# Patient Record
Sex: FEMALE | Race: WHITE | Employment: PART TIME | URBAN - METROPOLITAN AREA
[De-identification: names, ages, dates, MRNs, and addresses within clinical notes are randomized per-mention and may not be internally consistent; named-entity substitution may affect disease eponyms.]

---

## 2017-01-09 ENCOUNTER — HOSPITAL ENCOUNTER (EMERGENCY)
Age: 37
Discharge: HOME | End: 2017-01-09
Attending: EMERGENCY MEDICINE
Payer: SELF-PAY

## 2017-01-09 VITALS
WEIGHT: 120 LBS | RESPIRATION RATE: 16 BRPM | OXYGEN SATURATION: 97 % | TEMPERATURE: 97.6 F | SYSTOLIC BLOOD PRESSURE: 119 MMHG | HEIGHT: 66 IN | BODY MASS INDEX: 19.29 KG/M2 | HEART RATE: 86 BPM | DIASTOLIC BLOOD PRESSURE: 70 MMHG

## 2017-01-09 DIAGNOSIS — J06.9 VIRAL UPPER RESPIRATORY ILLNESS: Primary | ICD-10-CM

## 2017-01-09 LAB — DEPRECATED S PYO AG THROAT QL EIA: NEGATIVE

## 2017-01-09 PROCEDURE — 87081 CULTURE SCREEN ONLY: CPT | Performed by: EMERGENCY MEDICINE

## 2017-01-09 PROCEDURE — 99282 EMERGENCY DEPT VISIT SF MDM: CPT | Performed by: PHYSICIAN ASSISTANT

## 2017-01-09 PROCEDURE — 87880 STREP A ASSAY W/OPTIC: CPT | Performed by: EMERGENCY MEDICINE

## 2017-01-09 RX ORDER — BENZONATATE 200 MG/1
200 CAPSULE ORAL
Qty: 28 CAP | Refills: 0 | Status: SHIPPED | OUTPATIENT
Start: 2017-01-09 | End: 2017-07-12 | Stop reason: CLARIF

## 2017-01-09 RX ORDER — AZELASTINE 1 MG/ML
1 SPRAY, METERED NASAL 2 TIMES DAILY
Qty: 1 BOTTLE | Refills: 0 | Status: SHIPPED | OUTPATIENT
Start: 2017-01-09 | End: 2017-07-12 | Stop reason: CLARIF

## 2017-01-09 NOTE — ED NOTES
I have reviewed discharge instructions with the patient. The patient verbalized understanding. Prescriptions x 3, work note, and AVS given and explained to pt. Pt denies any further needs, questions, or concerns at this time. No adverse reactions to any medications, treatments, or procedures noted. Pt talking with SW about vouchers for prescriptions. Ambulatory to lobby with steady gait.

## 2017-01-09 NOTE — LETTER
[facility] [facility] EMERGENCY DEPT One [address] [id number] 
[phone] Work/School Note Date: 1/9/2017 To Whom It May concern: 
 
Radha Granados was seen and treated today in the emergency room by the following provider(s): 
Attending Provider: Trinidad Cheema MD 
Physician Assistant: AICHA Alvarez.   
 
Radha Granados may return to work on 01/11/17. Sincerely, AICHA Alvarez

## 2017-01-09 NOTE — DISCHARGE INSTRUCTIONS
Viral Respiratory Infection: Care Instructions  Your Care Instructions  Viruses are very small organisms. They grow in number after they enter your body. There are many types that cause different illnesses, such as colds and the mumps. The symptoms of a viral respiratory infection often start quickly. They include a fever, sore throat, and runny nose. You may also just not feel well. Or you may not want to eat much. Most viral respiratory infections are not serious. They usually get better with time and self-care. Antibiotics are not used to treat a viral infection. That's because antibiotics will not help cure a viral illness. In some cases, antiviral medicine can help your body fight a serious viral infection. Follow-up care is a key part of your treatment and safety. Be sure to make and go to all appointments, and call your doctor if you are having problems. It's also a good idea to know your test results and keep a list of the medicines you take. How can you care for yourself at home? · Rest as much as possible until you feel better. · Be safe with medicines. Take your medicine exactly as prescribed. Call your doctor if you think you are having a problem with your medicine. You will get more details on the specific medicine your doctor prescribes. · Take an over-the-counter pain medicine, such as acetaminophen (Tylenol), ibuprofen (Advil, Motrin), or naproxen (Aleve), as needed for pain and fever. Read and follow all instructions on the label. Do not give aspirin to anyone younger than 20. It has been linked to Reye syndrome, a serious illness. · Drink plenty of fluids, enough so that your urine is light yellow or clear like water. Hot fluids, such as tea or soup, may help relieve congestion in your nose and throat. If you have kidney, heart, or liver disease and have to limit fluids, talk with your doctor before you increase the amount of fluids you drink.   · Try to clear mucus from your lungs by breathing deeply and coughing. · Gargle with warm salt water once an hour. This can help reduce swelling and throat pain. Use 1 teaspoon of salt mixed in 1 cup of warm water. · Do not smoke or allow others to smoke around you. If you need help quitting, talk to your doctor about stop-smoking programs and medicines. These can increase your chances of quitting for good. To avoid spreading the virus  · Cough or sneeze into a tissue. Then throw the tissue away. · If you don't have a tissue, use your hand to cover your cough or sneeze. Then clean your hand. You can also cough into your sleeve. · Wash your hands often. Use soap and warm water. Wash for 15 to 20 seconds each time. · If you don't have soap and water near you, you can clean your hands with alcohol wipes or gel. When should you call for help? Call your doctor now or seek immediate medical care if:  · You have a new or higher fever. · Your fever lasts more than 48 hours. · You have trouble breathing. · You have a fever with a stiff neck or a severe headache. · You are sensitive to light. · You feel very sleepy or confused. Watch closely for changes in your health, and be sure to contact your doctor if:  · You do not get better as expected. Where can you learn more? Go to http://dalila-salena.info/. Enter N200 in the search box to learn more about \"Viral Respiratory Infection: Care Instructions. \"  Current as of: June 30, 2016  Content Version: 11.1  © 1067-8088 [employer]. Care instructions adapted under license by [employer] (which disclaims liability or warranty for this information). If you have questions about a medical condition or this instruction, always ask your healthcare professional. [address] any warranty or liability for your use of this information.

## 2017-01-09 NOTE — ROUTINE PROCESS
Voucher to [facility] for $45 for Mucinex, and benzonatate. Patient states that she is currently at [facility].

## 2017-01-09 NOTE — ED PROVIDER NOTES
HPI Comments: Patient is here with a sore throat and congestion that started yesterday when she woke up. She lives in a homeless shelter for 30 other women in their children and everyone has been sick with the same symptoms. She has had no nausea, vomiting, headache, shortness of breath, chest pain or documented fever. She does work and did miss work today. She has no swelling of her arms or legs or weakness or other symptoms. Patient is a 43 y.o. female presenting with sore throat. The history is provided by the patient. Sore Throat           History reviewed. No pertinent past medical history. History reviewed. No pertinent past surgical history. History reviewed. No pertinent family history. Social History     Social History    Marital status: LEGALLY      Spouse name: N/A    Number of children: N/A    Years of education: N/A     Occupational History    Not on file. Social History Main Topics    Smoking status: Current Every Day Smoker     Packs/day: 1.00    Smokeless tobacco: Not on file    Alcohol use No    Drug use: No    Sexual activity: Not on file     Other Topics Concern    Not on file     Social History Narrative         ALLERGIES: Review of patient's allergies indicates no known allergies. Review of Systems   Constitutional: Negative. HENT: Positive for sore throat. Eyes: Negative. Respiratory: Negative. Cardiovascular: Negative. Gastrointestinal: Negative. Genitourinary: Negative. Musculoskeletal: Negative. Skin: Negative. Neurological: Negative. Psychiatric/Behavioral: Negative. All other systems reviewed and are negative. Vitals:    01/09/17 1012   BP: 119/70   Pulse: 86   Resp: 16   Temp: 97.6 °F (36.4 °C)   SpO2: 97%   Weight: 54.4 kg (120 lb)   Height: 5' 6\" (1.676 m)            Physical Exam   Constitutional: She is oriented to person, place, and time. She appears well-developed and well-nourished.    HENT:   Head: Normocephalic and atraumatic. Right Ear: External ear normal.   Left Ear: External ear normal.   Mild posterior pharyngeal erythema and swelling, mild tenderness to anterior cervical lymph nodes bilaterally. Eyes: Conjunctivae and EOM are normal. Pupils are equal, round, and reactive to light. Neck: Normal range of motion. Neck supple. Cardiovascular: Normal rate, regular rhythm, normal heart sounds and intact distal pulses. Pulmonary/Chest: Effort normal and breath sounds normal. She has no wheezes. She has no rales. She exhibits no tenderness. Abdominal: Soft. Bowel sounds are normal.   Musculoskeletal: Normal range of motion. Neurological: She is alert and oriented to person, place, and time. She has normal reflexes. Skin: Skin is warm and dry. Psychiatric: She has a normal mood and affect. Her behavior is normal. Judgment and thought content normal.   Nursing note and vitals reviewed. MDM  Number of Diagnoses or Management Options     Amount and/or Complexity of Data Reviewed  Clinical lab tests: ordered and reviewed    Risk of Complications, Morbidity, and/or Mortality  Presenting problems: low  Diagnostic procedures: low  Management options: moderate    Patient Progress  Patient progress: stable    ED Course       Procedures    The patient was observed in the ED. Results Reviewed:      Recent Results (from the past 24 hour(s))   STREP AG SCREEN, GROUP A    Collection Time: 01/09/17 10:12 AM   Result Value Ref Range    Group A Strep Ag ID NEGATIVE  NEG       I'll treat patient for a viral upper respiratory infection today and have her use symptomatically treatment. Run a humidifier, drink plenty of fluids, rest and follow-up with a primary care physician if not doing any better. I did write her a note for work if needed as well. Return to the ED if worse. I discussed the results of all labs, procedures, radiographs, and treatments with the patient and available family. Treatment plan is agreed upon and the patient is ready for discharge. All voiced understanding of the discharge plan and medication instructions or changes as appropriate. Questions about treatment in the ED were answered. All were encouraged to return should symptoms worsen or new problems develop.

## 2017-01-11 LAB
BACTERIA SPEC CULT: NORMAL
SERVICE CMNT-IMP: NORMAL

## 2017-07-12 ENCOUNTER — APPOINTMENT (OUTPATIENT)
Dept: CT IMAGING | Age: 37
End: 2017-07-12
Attending: NURSE PRACTITIONER
Payer: SELF-PAY

## 2017-07-12 ENCOUNTER — HOSPITAL ENCOUNTER (EMERGENCY)
Age: 37
Discharge: HOME | End: 2017-07-12
Attending: EMERGENCY MEDICINE
Payer: SELF-PAY

## 2017-07-12 VITALS
TEMPERATURE: 99 F | HEIGHT: 66 IN | SYSTOLIC BLOOD PRESSURE: 117 MMHG | RESPIRATION RATE: 18 BRPM | DIASTOLIC BLOOD PRESSURE: 68 MMHG | BODY MASS INDEX: 19.29 KG/M2 | WEIGHT: 120 LBS | HEART RATE: 62 BPM | OXYGEN SATURATION: 99 %

## 2017-07-12 DIAGNOSIS — N83.10 CORPUS LUTEUM CYST: Primary | ICD-10-CM

## 2017-07-12 LAB
ALBUMIN SERPL BCP-MCNC: 3.8 G/DL (ref 3.5–5)
ALBUMIN/GLOB SERPL: 0.7 {RATIO} (ref 1.2–3.5)
ALP SERPL-CCNC: 108 U/L (ref 50–136)
ALT SERPL-CCNC: 15 U/L (ref 12–65)
ANION GAP BLD CALC-SCNC: 12 MMOL/L (ref 7–16)
AST SERPL W P-5'-P-CCNC: 12 U/L (ref 15–37)
BASOPHILS # BLD AUTO: 0 K/UL (ref 0–0.2)
BASOPHILS # BLD: 0 % (ref 0–2)
BILIRUB SERPL-MCNC: 0.3 MG/DL (ref 0.2–1.1)
BUN SERPL-MCNC: 8 MG/DL (ref 6–23)
CALCIUM SERPL-MCNC: 9 MG/DL (ref 8.3–10.4)
CHLORIDE SERPL-SCNC: 101 MMOL/L (ref 98–107)
CO2 SERPL-SCNC: 26 MMOL/L (ref 21–32)
CREAT SERPL-MCNC: 0.68 MG/DL (ref 0.6–1)
DIFFERENTIAL METHOD BLD: ABNORMAL
EOSINOPHIL # BLD: 0.2 K/UL (ref 0–0.8)
EOSINOPHIL NFR BLD: 1 % (ref 0.5–7.8)
ERYTHROCYTE [DISTWIDTH] IN BLOOD BY AUTOMATED COUNT: 13.5 % (ref 11.9–14.6)
GLOBULIN SER CALC-MCNC: 5.8 G/DL (ref 2.3–3.5)
GLUCOSE SERPL-MCNC: 131 MG/DL (ref 65–100)
HCG UR QL: NEGATIVE
HCT VFR BLD AUTO: 46.1 % (ref 35.8–46.3)
HGB BLD-MCNC: 15.5 G/DL (ref 11.7–15.4)
IMM GRANULOCYTES # BLD: 0 K/UL (ref 0–0.5)
IMM GRANULOCYTES NFR BLD AUTO: 0.3 % (ref 0–5)
LIPASE SERPL-CCNC: 98 U/L (ref 73–393)
LYMPHOCYTES # BLD AUTO: 12 % (ref 13–44)
LYMPHOCYTES # BLD: 1.6 K/UL (ref 0.5–4.6)
MCH RBC QN AUTO: 30.5 PG (ref 26.1–32.9)
MCHC RBC AUTO-ENTMCNC: 33.6 G/DL (ref 31.4–35)
MCV RBC AUTO: 90.7 FL (ref 79.6–97.8)
MONOCYTES # BLD: 0.7 K/UL (ref 0.1–1.3)
MONOCYTES NFR BLD AUTO: 6 % (ref 4–12)
NEUTS SEG # BLD: 10.6 K/UL (ref 1.7–8.2)
NEUTS SEG NFR BLD AUTO: 81 % (ref 43–78)
PLATELET # BLD AUTO: 359 K/UL (ref 150–450)
PMV BLD AUTO: 10.7 FL (ref 10.8–14.1)
POTASSIUM SERPL-SCNC: 3.7 MMOL/L (ref 3.5–5.1)
PROT SERPL-MCNC: 9.6 G/DL (ref 6.3–8.2)
RBC # BLD AUTO: 5.08 M/UL (ref 4.05–5.25)
SODIUM SERPL-SCNC: 139 MMOL/L (ref 136–145)
WBC # BLD AUTO: 13.2 K/UL (ref 4.3–11.1)

## 2017-07-12 PROCEDURE — 74011000258 HC RX REV CODE- 258: Performed by: EMERGENCY MEDICINE

## 2017-07-12 PROCEDURE — 81003 URINALYSIS AUTO W/O SCOPE: CPT | Performed by: NURSE PRACTITIONER

## 2017-07-12 PROCEDURE — 74011636320 HC RX REV CODE- 636/320: Performed by: NURSE PRACTITIONER

## 2017-07-12 PROCEDURE — 83690 ASSAY OF LIPASE: CPT | Performed by: NURSE PRACTITIONER

## 2017-07-12 PROCEDURE — 99285 EMERGENCY DEPT VISIT HI MDM: CPT | Performed by: NURSE PRACTITIONER

## 2017-07-12 PROCEDURE — 81025 URINE PREGNANCY TEST: CPT

## 2017-07-12 PROCEDURE — 96361 HYDRATE IV INFUSION ADD-ON: CPT | Performed by: NURSE PRACTITIONER

## 2017-07-12 PROCEDURE — 85025 COMPLETE CBC W/AUTO DIFF WBC: CPT | Performed by: NURSE PRACTITIONER

## 2017-07-12 PROCEDURE — 96374 THER/PROPH/DIAG INJ IV PUSH: CPT | Performed by: NURSE PRACTITIONER

## 2017-07-12 PROCEDURE — 74011636320 HC RX REV CODE- 636/320: Performed by: EMERGENCY MEDICINE

## 2017-07-12 PROCEDURE — 96375 TX/PRO/DX INJ NEW DRUG ADDON: CPT | Performed by: NURSE PRACTITIONER

## 2017-07-12 PROCEDURE — 74177 CT ABD & PELVIS W/CONTRAST: CPT

## 2017-07-12 PROCEDURE — 80053 COMPREHEN METABOLIC PANEL: CPT | Performed by: NURSE PRACTITIONER

## 2017-07-12 PROCEDURE — 74011250636 HC RX REV CODE- 250/636: Performed by: NURSE PRACTITIONER

## 2017-07-12 RX ORDER — ONDANSETRON 2 MG/ML
4 INJECTION INTRAMUSCULAR; INTRAVENOUS
Status: COMPLETED | OUTPATIENT
Start: 2017-07-12 | End: 2017-07-12

## 2017-07-12 RX ORDER — TRAMADOL HYDROCHLORIDE 50 MG/1
50 TABLET ORAL
Qty: 15 TAB | Refills: 0 | Status: SHIPPED | OUTPATIENT
Start: 2017-07-12 | End: 2017-07-26

## 2017-07-12 RX ORDER — KETOROLAC TROMETHAMINE 30 MG/ML
30 INJECTION, SOLUTION INTRAMUSCULAR; INTRAVENOUS
Status: COMPLETED | OUTPATIENT
Start: 2017-07-12 | End: 2017-07-12

## 2017-07-12 RX ORDER — SODIUM CHLORIDE 0.9 % (FLUSH) 0.9 %
10 SYRINGE (ML) INJECTION
Status: COMPLETED | OUTPATIENT
Start: 2017-07-12 | End: 2017-07-12

## 2017-07-12 RX ADMIN — Medication 10 ML: at 16:03

## 2017-07-12 RX ADMIN — DIATRIZOATE MEGLUMINE AND DIATRIZOATE SODIUM 15 ML: 600; 100 SOLUTION ORAL; RECTAL at 14:37

## 2017-07-12 RX ADMIN — SODIUM CHLORIDE 1000 ML: 900 INJECTION, SOLUTION INTRAVENOUS at 14:31

## 2017-07-12 RX ADMIN — IOPAMIDOL 100 ML: 755 INJECTION, SOLUTION INTRAVENOUS at 16:03

## 2017-07-12 RX ADMIN — KETOROLAC TROMETHAMINE 30 MG: 30 INJECTION, SOLUTION INTRAMUSCULAR at 14:31

## 2017-07-12 RX ADMIN — SODIUM CHLORIDE 100 ML: 900 INJECTION, SOLUTION INTRAVENOUS at 16:03

## 2017-07-12 RX ADMIN — ONDANSETRON 4 MG: 2 INJECTION INTRAMUSCULAR; INTRAVENOUS at 14:32

## 2017-07-12 NOTE — LETTER
[address] [facility] EMERGENCY DEPT 
[facility] [address] [place] [id number] 
[phone] Work/School Note Date: 7/12/2017 To Whom It May concern: 
 
Hailey Bush was seen and treated today in the emergency room by the following provider(s): 
Attending Provider: Yina Williamson MD 
Nurse Practitioner: DEBORAH Donahue. Hailey Bush may return to work on 7/12/2017. Sincerely, 
 
 
 
 
Sara Mohamud RN.

## 2017-07-12 NOTE — DISCHARGE INSTRUCTIONS
Functional Ovarian Cyst: Care Instructions  Your Care Instructions    A functional ovarian cyst is a sac that forms on the surface of a woman's ovary during ovulation. The sac holds a maturing egg. Usually the sac goes away after the egg is released. But if the egg is not released, or if the sac closes up after the egg is released, the sac can swell up with fluid and form a cyst.  Functional ovarian cysts are different than ovarian growths caused by other problems, such as cancer. Most functional ovarian cysts cause no symptoms and go away on their own. Some cause mild pain. Others can cause severe pain when they rupture or bleed. Follow-up care is a key part of your treatment and safety. Be sure to make and go to all appointments, and call your doctor if you are having problems. It's also a good idea to know your test results and keep a list of the medicines you take. How can you care for yourself at home? · Use heat, such as a hot water bottle, a heating pad set on low, or a warm bath, to relax tense muscles and relieve cramping. · Take pain medicines exactly as directed. ¨ If the doctor gave you a prescription medicine for pain, take it as prescribed. ¨ If you are not taking a prescription pain medicine, ask your doctor if you can take an over-the-counter medicine. · Avoid constipation. Make sure you drink enough fluids and include fruits, vegetables, and fiber in your diet each day. Constipation does not cause ovarian cysts, but it may make your pelvic pain worse. When should you call for help? Call 911 anytime you think you may need emergency care. For example, call if:  · You passed out (lost consciousness). · You have sudden, severe pain in your belly or your pelvis. Call your doctor now or seek immediate medical care if:  · You have new belly or pelvic pain, or your pain gets worse. · You have severe vaginal bleeding.  This means that you are soaking through your usual pads or tampons each hour for 2 or more hours. · You are dizzy or lightheaded, or you feel like you may faint. · You have pain or bleeding during or after sex. Watch closely for changes in your health, and be sure to contact your doctor if:  · Your pain keeps you from doing the things that you enjoy. · You do not get better as expected. Where can you learn more? Go to http://dalila-salena.info/. Enter N491 in the search box to learn more about \"Functional Ovarian Cyst: Care Instructions. \"  Current as of: October 13, 2016  Content Version: 11.3  © 9715-3695 [employer]. Care instructions adapted under license by [employer] (which disclaims liability or warranty for this information). If you have questions about a medical condition or this instruction, always ask your healthcare professional. [address] any warranty or liability for your use of this information.

## 2017-07-12 NOTE — ED NOTES
I have reviewed discharge instructions with the patient. The patient verbalized understanding. Prescription given to pt. Patient denies any further needs, questions, or concerns at this time. No adverse reactions to any treatments, meds, or procedures noted. PT signed hard copy d/c form.

## 2017-07-12 NOTE — ED TRIAGE NOTES
Patient complaining of right sided abdominal pain, level with umbilicus that started yesterday around 1700. Patient advises some diarrhea.

## 2017-07-12 NOTE — PROGRESS NOTES
Visited with patient as no PCP listed in chart. Patients states no PCP and no insurance. States she just started a new job but won't be eligible for [employer] for a couple of months. Explained the [address] program in detail and provided patient with resources. Cell phone number of Allan Amezquita with [address] given to patient and patient encouraged to follow up with her as soon as possible to see if she is eligible until her insurance commences.

## 2017-07-12 NOTE — ED PROVIDER NOTES
HPI Comments: Patient presents with right sided abdominal pain at the level of her umbilicus and radiates down into her RLQ. She states some diarrhea but denies nausea and vomiting. She denies vaginal discharge and urinary complaints. Patient is a 42 y.o. female presenting with abdominal pain. The history is provided by the patient. Abdominal Pain    This is a new problem. The current episode started yesterday. The problem occurs constantly. The problem has not changed since onset. The pain is located in the periumbilical region and RLQ. The quality of the pain is sharp. The pain is at a severity of 6/10. The pain is mild. Associated symptoms include diarrhea. Pertinent negatives include no anorexia, no fever, no belching, no flatus, no hematochezia, no melena, no nausea, no vomiting, no constipation, no dysuria, no frequency, no hematuria, no headaches, no arthralgias, no myalgias, no trauma, no chest pain, no testicular pain and no back pain. Nothing worsens the pain. The pain is relieved by nothing. History reviewed. No pertinent past medical history. Past Surgical History:   Procedure Laterality Date    HX GYN          HX ORTHOPAEDIC      cyst behind right knee         History reviewed. No pertinent family history. Social History     Social History    Marital status: LEGALLY      Spouse name: N/A    Number of children: N/A    Years of education: N/A     Occupational History    Not on file. Social History Main Topics    Smoking status: Current Every Day Smoker     Packs/day: 1.00    Smokeless tobacco: Never Used    Alcohol use No    Drug use: No    Sexual activity: Not on file     Other Topics Concern    Not on file     Social History Narrative         ALLERGIES: Review of patient's allergies indicates no known allergies. Review of Systems   Constitutional: Negative for chills and fever. Respiratory: Negative for cough and shortness of breath. Cardiovascular: Negative for chest pain. Gastrointestinal: Positive for abdominal pain and diarrhea. Negative for anorexia, constipation, flatus, hematochezia, melena, nausea and vomiting. Genitourinary: Negative for dysuria, frequency, hematuria, pelvic pain and testicular pain. Musculoskeletal: Negative for arthralgias, back pain and myalgias. Neurological: Negative for dizziness, weakness and headaches. Vitals:    07/12/17 1353 07/12/17 1409 07/12/17 1430   BP: 136/80 124/90 128/83   Pulse: (!) 107     Resp: 16     Temp: 99 °F (37.2 °C)     SpO2: 100% 100% 100%   Weight: 54.4 kg (120 lb)     Height: 5' 6\" (1.676 m)              Physical Exam   Constitutional: She is oriented to person, place, and time. She appears well-developed and well-nourished. No distress. Cardiovascular: Normal rate and regular rhythm. No murmur heard. Pulmonary/Chest: Effort normal and breath sounds normal. No respiratory distress. She has no wheezes. Abdominal: Soft. Normal appearance and bowel sounds are normal. There is tenderness in the right lower quadrant and periumbilical area. Musculoskeletal: Normal range of motion. Neurological: She is alert and oriented to person, place, and time. Skin: Skin is warm and dry. She is not diaphoretic. Nursing note and vitals reviewed. Recent Results (from the past 12 hour(s))   CBC WITH AUTOMATED DIFF    Collection Time: 07/12/17  2:08 PM   Result Value Ref Range    WBC 13.2 (H) 4.3 - 11.1 K/uL    RBC 5.08 4.05 - 5.25 M/uL    HGB 15.5 (H) 11.7 - 15.4 g/dL    HCT 46.1 35.8 - 46.3 %    MCV 90.7 79.6 - 97.8 FL    MCH 30.5 26.1 - 32.9 PG    MCHC 33.6 31.4 - 35.0 g/dL    RDW 13.5 11.9 - 14.6 %    PLATELET 883 214 - 709 K/uL    MPV 10.7 (L) 10.8 - 14.1 FL    DF AUTOMATED      NEUTROPHILS 81 (H) 43 - 78 %    LYMPHOCYTES 12 (L) 13 - 44 %    MONOCYTES 6 4.0 - 12.0 %    EOSINOPHILS 1 0.5 - 7.8 %    BASOPHILS 0 0.0 - 2.0 %    IMMATURE GRANULOCYTES 0.3 0.0 - 5.0 %    ABS. NEUTROPHILS 10.6 (H) 1.7 - 8.2 K/UL    ABS. LYMPHOCYTES 1.6 0.5 - 4.6 K/UL    ABS. MONOCYTES 0.7 0.1 - 1.3 K/UL    ABS. EOSINOPHILS 0.2 0.0 - 0.8 K/UL    ABS. BASOPHILS 0.0 0.0 - 0.2 K/UL    ABS. IMM. GRANS. 0.0 0.0 - 0.5 K/UL   METABOLIC PANEL, COMPREHENSIVE    Collection Time: 07/12/17  2:08 PM   Result Value Ref Range    Sodium 139 136 - 145 mmol/L    Potassium 3.7 3.5 - 5.1 mmol/L    Chloride 101 98 - 107 mmol/L    CO2 26 21 - 32 mmol/L    Anion gap 12 7 - 16 mmol/L    Glucose 131 (H) 65 - 100 mg/dL    BUN 8 6 - 23 MG/DL    Creatinine 0.68 0.6 - 1.0 MG/DL    GFR est AA >60 >60 ml/min/1.73m2    GFR est non-AA >60 >60 ml/min/1.73m2    Calcium 9.0 8.3 - 10.4 MG/DL    Bilirubin, total 0.3 0.2 - 1.1 MG/DL    ALT (SGPT) 15 12 - 65 U/L    AST (SGOT) 12 (L) 15 - 37 U/L    Alk. phosphatase 108 50 - 136 U/L    Protein, total 9.6 (H) 6.3 - 8.2 g/dL    Albumin 3.8 3.5 - 5.0 g/dL    Globulin 5.8 (H) 2.3 - 3.5 g/dL    A-G Ratio 0.7 (L) 1.2 - 3.5     LIPASE    Collection Time: 07/12/17  2:08 PM   Result Value Ref Range    Lipase 98 73 - 393 U/L   HCG URINE, QL. - POC    Collection Time: 07/12/17  2:18 PM   Result Value Ref Range    Pregnancy test,urine (POC) NEGATIVE  NEG            CT ABD PELV W CONT (Final result) Result time: 07/12/17 16:44:48     Final result by Khushboo Mendoza MD (07/12/17 16:44:48)     Impression:     IMPRESSION:     1. No CT evidence to suggest appendicitis. 2. Small corpus luteal cyst on the right ovary. 3. No bowel obstruction or inflammation.     Narrative:     CT ABDOMEN AND PELVIS WITH CONTRAST    HISTORY: 53-year-old female with right lower quadrant pain, fever, and  leukocytosis. COMPARISON: None    TECHNIQUE: 100 mL of Isovue-370 was administered intravenously and axial helical  CT images were obtained from above the diaphragm through the pelvis.  Oral  contrast was administered.  Coronal reformatted images were obtained at the  scanner console and made available for review. Radiation dose reduction techniques were used for this study.  All CT scans  performed at this facility use one or all of the following: Automated exposure  control, adjustment of the mA and/or kVp according to patient's size, iterative  reconstruction. FINDINGS: The lung bases are clear. There is no pleural or pericardial effusion. The liver, spleen, pancreas, gallbladder, and adrenal glands are normal in  appearance. The kidneys demonstrate symmetric enhancement without  hydronephrosis, nephrolithiasis, or perinephric stranding. The small bowel and  colon demonstrate normal enhancement without wall thickening or dilatation. The  appendix is not definitively seen; however, there are no inflammatory changes in  the right lower quadrant to suggest appendicitis. There is no free fluid. There is a 1 cm corpus luteum on the right ovary. There is a simple left ovarian  cyst. The bladder is decompressed without calcifications. There are no acute or  concerning bony lesions.               MDM  Number of Diagnoses or Management Options  Corpus luteum cyst:   Diagnosis management comments: No acute findings noted on lab results. Ultrasound negative for appendicitis but did have cyst on her right ovary. Patient discharged with GYN follow up.         Amount and/or Complexity of Data Reviewed  Clinical lab tests: ordered and reviewed  Tests in the radiology section of CPT®: ordered and reviewed  Tests in the medicine section of CPT®: reviewed and ordered  Discuss the patient with other providers: yes    Patient Progress  Patient progress: stable    ED Course       Procedures

## 2017-07-26 ENCOUNTER — HOSPITAL ENCOUNTER (EMERGENCY)
Age: 37
Discharge: HOME | End: 2017-07-26
Attending: EMERGENCY MEDICINE
Payer: SELF-PAY

## 2017-07-26 VITALS
TEMPERATURE: 98.9 F | DIASTOLIC BLOOD PRESSURE: 89 MMHG | OXYGEN SATURATION: 100 % | WEIGHT: 125 LBS | HEART RATE: 96 BPM | RESPIRATION RATE: 20 BRPM | BODY MASS INDEX: 19.62 KG/M2 | HEIGHT: 67 IN | SYSTOLIC BLOOD PRESSURE: 139 MMHG

## 2017-07-26 DIAGNOSIS — L03.116 CELLULITIS OF LEFT LOWER EXTREMITY: Primary | ICD-10-CM

## 2017-07-26 PROCEDURE — 99282 EMERGENCY DEPT VISIT SF MDM: CPT | Performed by: PHYSICIAN ASSISTANT

## 2017-07-26 RX ORDER — SULFAMETHOXAZOLE AND TRIMETHOPRIM 800; 160 MG/1; MG/1
1 TABLET ORAL 2 TIMES DAILY
Qty: 14 TAB | Refills: 0 | Status: SHIPPED | OUTPATIENT
Start: 2017-07-26 | End: 2017-08-02

## 2017-07-26 RX ORDER — TRAMADOL HYDROCHLORIDE 50 MG/1
50 TABLET ORAL
Qty: 6 TAB | Refills: 0 | Status: SHIPPED | OUTPATIENT
Start: 2017-07-26 | End: 2019-04-10

## 2017-07-26 NOTE — ED TRIAGE NOTES
Patient c/o \"cyst near my vagina. \"   Patient states she applied warm compresses but denies drainage.

## 2017-07-26 NOTE — DISCHARGE INSTRUCTIONS

## 2017-07-26 NOTE — ED PROVIDER NOTES
Patient is a 42 y.o. female presenting with skin problem. The history is provided by the patient. Skin Problem    This is a new problem. The current episode started 2 days ago. The problem has been gradually worsening. The problem is associated with an unknown factor. There has been no fever. Affected Location: left groin  The pain is at a severity of 7/10. The pain is mild. The pain has been constant since onset. Associated symptoms include pain. Pertinent negatives include no itching and no weeping. Treatments tried: warm compress. The treatment provided no relief. No past medical history on file. Past Surgical History:   Procedure Laterality Date    HX GYN          HX ORTHOPAEDIC      cyst behind right knee         No family history on file. Social History     Social History    Marital status: LEGALLY      Spouse name: N/A    Number of children: N/A    Years of education: N/A     Occupational History    Not on file. Social History Main Topics    Smoking status: Current Every Day Smoker     Packs/day: 1.00    Smokeless tobacco: Never Used    Alcohol use No    Drug use: No    Sexual activity: Not on file     Other Topics Concern    Not on file     Social History Narrative         ALLERGIES: Review of patient's allergies indicates no known allergies. Review of Systems   Skin: Negative for itching. All other systems reviewed and are negative. Vitals:    17 0917   BP: 139/89   Pulse: 96   Resp: 20   Temp: 98.9 °F (37.2 °C)   SpO2: 100%   Weight: 56.7 kg (125 lb)   Height: 5' 7\" (1.702 m)            Physical Exam   Constitutional: She is oriented to person, place, and time. She appears well-developed and well-nourished. No distress. HENT:   Head: Normocephalic and atraumatic. Eyes: Conjunctivae and EOM are normal. Pupils are equal, round, and reactive to light. Neck: Normal range of motion. Neck supple.    Cardiovascular: Normal rate and regular rhythm. Pulmonary/Chest: Effort normal and breath sounds normal. No respiratory distress. She has no wheezes. Abdominal: Soft. Bowel sounds are normal.   Musculoskeletal: She exhibits tenderness. She exhibits no edema. Left groin with redness over old scar from previous I&d several years ago, no swelling no streaking no fluctuance    Neurological: She is alert and oriented to person, place, and time. Skin: Skin is warm. Nursing note and vitals reviewed.        MDM  Number of Diagnoses or Management Options  Diagnosis management comments: Cellulitis to left groin vs early abscess  Will place on septra and stress pmd follow up        Amount and/or Complexity of Data Reviewed  Review and summarize past medical records: yes    Risk of Complications, Morbidity, and/or Mortality  Presenting problems: low  Diagnostic procedures: low  Management options: low    Patient Progress  Patient progress: improved    ED Course       Procedures

## 2017-09-20 ENCOUNTER — APPOINTMENT (OUTPATIENT)
Dept: GENERAL RADIOLOGY | Age: 37
End: 2017-09-20
Attending: EMERGENCY MEDICINE
Payer: SELF-PAY

## 2017-09-20 ENCOUNTER — HOSPITAL ENCOUNTER (EMERGENCY)
Age: 37
Discharge: HOME | End: 2017-09-20
Attending: EMERGENCY MEDICINE
Payer: SELF-PAY

## 2017-09-20 VITALS
BODY MASS INDEX: 19.29 KG/M2 | WEIGHT: 120 LBS | RESPIRATION RATE: 18 BRPM | OXYGEN SATURATION: 98 % | DIASTOLIC BLOOD PRESSURE: 82 MMHG | TEMPERATURE: 98.2 F | HEIGHT: 66 IN | HEART RATE: 85 BPM | SYSTOLIC BLOOD PRESSURE: 120 MMHG

## 2017-09-20 DIAGNOSIS — L98.9 SKIN PROBLEM: Primary | ICD-10-CM

## 2017-09-20 LAB
ANION GAP SERPL CALC-SCNC: 8 MMOL/L (ref 7–16)
BASOPHILS # BLD: 0 K/UL (ref 0–0.2)
BASOPHILS NFR BLD: 0 % (ref 0–2)
BUN SERPL-MCNC: 5 MG/DL (ref 6–23)
CALCIUM SERPL-MCNC: 8.6 MG/DL (ref 8.3–10.4)
CHLORIDE SERPL-SCNC: 103 MMOL/L (ref 98–107)
CO2 SERPL-SCNC: 27 MMOL/L (ref 21–32)
CREAT SERPL-MCNC: 0.71 MG/DL (ref 0.6–1)
DIFFERENTIAL METHOD BLD: ABNORMAL
EOSINOPHIL # BLD: 0.3 K/UL (ref 0–0.8)
EOSINOPHIL NFR BLD: 3 % (ref 0.5–7.8)
ERYTHROCYTE [DISTWIDTH] IN BLOOD BY AUTOMATED COUNT: 12.8 % (ref 11.9–14.6)
GLUCOSE SERPL-MCNC: 88 MG/DL (ref 65–100)
HCT VFR BLD AUTO: 46.3 % (ref 35.8–46.3)
HGB BLD-MCNC: 15.5 G/DL (ref 11.7–15.4)
IMM GRANULOCYTES # BLD: 0.1 K/UL (ref 0–0.5)
IMM GRANULOCYTES NFR BLD: 0.7 % (ref 0–5)
LYMPHOCYTES # BLD: 2 K/UL (ref 0.5–4.6)
LYMPHOCYTES NFR BLD: 19 % (ref 13–44)
MCH RBC QN AUTO: 30.6 PG (ref 26.1–32.9)
MCHC RBC AUTO-ENTMCNC: 33.5 G/DL (ref 31.4–35)
MCV RBC AUTO: 91.5 FL (ref 79.6–97.8)
MONOCYTES # BLD: 0.9 K/UL (ref 0.1–1.3)
MONOCYTES NFR BLD: 8 % (ref 4–12)
NEUTS SEG # BLD: 7.4 K/UL (ref 1.7–8.2)
NEUTS SEG NFR BLD: 69 % (ref 43–78)
PLATELET # BLD AUTO: 365 K/UL (ref 150–450)
PMV BLD AUTO: 10.4 FL (ref 10.8–14.1)
POTASSIUM SERPL-SCNC: 3.7 MMOL/L (ref 3.5–5.1)
RBC # BLD AUTO: 5.06 M/UL (ref 4.05–5.25)
SODIUM SERPL-SCNC: 138 MMOL/L (ref 136–145)
WBC # BLD AUTO: 10.6 K/UL (ref 4.3–11.1)

## 2017-09-20 PROCEDURE — 74011250637 HC RX REV CODE- 250/637: Performed by: EMERGENCY MEDICINE

## 2017-09-20 PROCEDURE — 85025 COMPLETE CBC W/AUTO DIFF WBC: CPT | Performed by: EMERGENCY MEDICINE

## 2017-09-20 PROCEDURE — 71020 XR CHEST PA LAT: CPT

## 2017-09-20 PROCEDURE — 99283 EMERGENCY DEPT VISIT LOW MDM: CPT | Performed by: EMERGENCY MEDICINE

## 2017-09-20 PROCEDURE — 80048 BASIC METABOLIC PNL TOTAL CA: CPT | Performed by: EMERGENCY MEDICINE

## 2017-09-20 RX ORDER — DOXYCYCLINE HYCLATE 100 MG
100 TABLET ORAL 2 TIMES DAILY
Qty: 20 TAB | Refills: 0 | Status: SHIPPED | OUTPATIENT
Start: 2017-09-20 | End: 2017-09-30

## 2017-09-20 RX ORDER — IBUPROFEN 200 MG
TABLET ORAL
COMMUNITY
End: 2019-04-10 | Stop reason: ALTCHOICE

## 2017-09-20 RX ORDER — DOXYCYCLINE 100 MG/1
100 CAPSULE ORAL
Status: COMPLETED | OUTPATIENT
Start: 2017-09-20 | End: 2017-09-20

## 2017-09-20 RX ADMIN — DOXYCYCLINE HYCLATE 100 MG: 100 CAPSULE ORAL at 22:40

## 2017-09-20 NOTE — LETTER
[facility] EMERGENCY DEPT 
[address] [place] [address] [id number] [phone] Work/School Note Date: 9/20/2017 To Whom It May concern: 
 
Pina Shirley was seen and treated today in the emergency room by the following provider(s): 
Attending Provider: Sarah Page MD. Pina Shirley may return to work on 9/22/17.  
 
Sincerely, 
 
 
 
 
Cj Cantor RN

## 2017-09-21 NOTE — ED NOTES
I have reviewed discharge instructions with the patient. The patient verbalized understanding. Patient left ED via Discharge Method: ambulatory to Home with , self     Opportunity for questions and clarification provided. Patient given 1 scripts.

## 2017-09-21 NOTE — DISCHARGE INSTRUCTIONS
AS WE DISCUSSED, return with any swelling, redness, fevers or chills nausea or vomiting, ANY drainage from the wound, or any further concerns or otherwise for recheck in 1-2 days    THE SWELLING UNDER YOUR ARM MAY BE DUE TO INFECTION OR MAY BE CAUSED BY OTHER PROCESS WHICH REQUIRE FURTHER WORKUP INCLUDING MAMMOGRAM OF YOUR BREASTS. WE WILL TRY A TRIAL OF ANTIBIOTICS TO ASSESS FOR IMPROVEMENT, HOWEVER AGAIN IF PAIN OR SWELLING WORSENS OR IF YOU DEVELOP ANY FURTHER CONCERNS AS ABOVE THEN PLEASE RETURN IMMEDIATELY TO THE EMERGENCY DEPARTMENT. Doxycycline (By mouth)   Doxycycline ([id number])  Treats and prevents infections. Also used to prevent malaria and treat rosacea or severe acne. This medicine is a tetracycline antibiotic. Brand Name(s): Acticlate, Adoxa, Adoxa Feliciano 1/150, Avidoxy, Avidoxy DK, BenzoDox 30 Kit, BenzoDox 60 Kit, Doryx, Doryx MPC, Monodox, Morgidox 1M480JK, Morgidox 2c395AE Kit, Morgidox 1x50MG, Morgidox 1x50MG Kit, Morgidox [id number]   There may be other brand names for this medicine. When This Medicine Should Not Be Used: This medicine is not right for everyone. Do not use it if you had an allergic reaction to doxycycline or another tetracycline antibiotic, or if you are pregnant or breastfeeding. How to Use This Medicine:   Capsule, Delayed Release Capsule, Long Acting Capsule, Liquid, Tablet, Delayed Release Tablet  · Your doctor will tell you how much medicine to use. Do not use more than directed. · Ask your pharmacist or doctor if you need to take this medicine with or without food. Some forms can be taken with food or milk, but others must be taken on an empty stomach. · Tablets: You may take this medicine with food or milk to avoid stomach irritation. To break a tablet, hold the tablet between your thumb and index fingers close to the appropriate scored line. Then, apply enough pressure to snap the tablet segments apart.  Do not use the tablet if it does not break on the scored lines.  · Delayed-release tablets: You may also take this medicine by sprinkling the broken tablets onto room-temperature applesauce. Swallow this mixture right away; do not chew. Do not store the mixture for later use. · Oracea® capsules: This medicine must be taken on an empty stomach, at least 1 hour before or 2 hours after a meal.  · Capsule: Swallow whole. Do not break, crush, chew, or open it. · Oral liquid: Shake the bottle well just before each use. Measure the oral liquid medicine with a marked measuring spoon, oral syringe, or medicine cup. · Take all of the medicine in your prescription to clear up your infection, even if you feel better after the first few doses. · Drink plenty of fluids to avoid throat problems, if you take the capsule or tablet form. · Malaria prevention: Start taking the medicine 1 or 2 days before you travel. Take the medicine every day during your trip. Keep taking it for 4 weeks after you return. However, do not use the medicine for longer than 4 months. · Do not use this medicine for more than 9 months if you are using it for rosacea. · Use only the brand of medicine your doctor prescribed. Other brands may not work the same way. · Read and follow the patient instructions that come with this medicine. Talk to your doctor or pharmacist if you have any questions. · Missed dose: Take a dose as soon as you remember. If it is almost time for your next dose, wait until then and take a regular dose. Do not take extra medicine to make up for a missed dose. · Store the medicine in a closed container at room temperature, away from heat, moisture, and direct light. Do not freeze the oral liquid. Drugs and Foods to Avoid:   Ask your doctor or pharmacist before using any other medicine, including over-the-counter medicines, vitamins, and herbal products. · Some foods and medicines can affect how doxycycline works.  Tell your doctor if you are using any of the following:  ¨ Bismuth subsalicylate, isotretinoin or other acne medicines, acitretin or other medicine to treat psoriasis  ¨ Penicillin antibiotic, birth control pills, medicine for seizures (such as carbamazepine, phenobarbital, phenytoin), stomach medicine, a blood thinner (such as warfarin), or any medicine that contains aluminum, calcium, or iron (such an antacid or vitamin supplement)  Warnings While Using This Medicine:   · This medicine may cause birth defects if either partner is using it during conception or pregnancy. Tell your doctor right away if you or your partner becomes pregnant. Birth control pills may not work as well when used with this medicine. Use a second form of birth control to keep from getting pregnant. · Tell your doctor if you have kidney disease, liver disease, asthma, or an allergy to sulfites. Tell your doctor if you had surgery on your stomach, or if you have a history of yeast infections. · This medicine may cause the following problems:  ¨ Permanent change in tooth color (in children younger than 6years old)  ¨ Increased pressure inside the head  ¨ Yeast infection  ¨ Immune system problems  · This medicine can cause diarrhea. Call your doctor if the diarrhea becomes severe, does not stop, or is bloody. Do not take any medicine to stop diarrhea until you have talked to your doctor. Diarrhea can occur 2 months or more after you stop taking this medicine. · This medicine may make your skin more sensitive to sunlight. Wear sunscreen. Do not use sunlamps or tanning beds. · Tell any doctor or dentist who treats you that you are using this medicine. This medicine may affect certain medical test results. · Call your doctor if your symptoms do not improve or if they get worse. · Keep all medicine out of the reach of children. Never share your medicine with anyone.   Possible Side Effects While Using This Medicine:   Call your doctor right away if you notice any of these side effects:  · Allergic reaction: Itching or hives, swelling in your face or hands, swelling or tingling in your mouth or throat, chest tightness, trouble breathing  · Blistering, peeling, red skin rash  · Burning, pain, or irritation in your upper stomach or throat  · Diarrhea that may contain blood  · Fever, chills, cough, runny or stuffy nose, sore throat, and body aches  · Joint pain, fever, rash, and unusual tiredness or weakness  · Severe headache, dizziness, or vision changes  If you notice these less serious side effects, talk with your doctor:   · Darkening of your skin, scars, teeth, or gums  · Sores or white patches on your lips, mouth, or throat  If you notice other side effects that you think are caused by this medicine, tell your doctor. Call your doctor for medical advice about side effects. You may report side effects to FDA at 7-210-FDA-4968  © 2017 [facility] Information is for End User's use only and may not be sold, redistributed or otherwise used for commercial purposes. The above information is an  only. It is not intended as medical advice for individual conditions or treatments. Talk to your doctor, nurse or pharmacist before following any medical regimen to see if it is safe and effective for you.

## 2017-09-21 NOTE — ED PROVIDER NOTES
HPI Comments: Patient is a 43 yo female with concern for a lump under her right armpit. Patient states also with cough and chest congestion for the past few weeks. Denies any nausea or vomiting, no abdominal pain, no fevers or chills, no night sweats, no chest pain, no further complaints. States cough productive of green/yellow flem. Patient is a 42 y.o. female presenting with skin problem and nasal congestion. The history is provided by the patient. No  was used. Skin Problem      Nasal Congestion   Pertinent negatives include no chest pain, no abdominal pain, no headaches and no shortness of breath. Past Medical History:   Diagnosis Date    Arthritis        Past Surgical History:   Procedure Laterality Date    HX GYN          HX ORTHOPAEDIC      cyst behind right knee         History reviewed. No pertinent family history. Social History     Social History    Marital status: LEGALLY      Spouse name: N/A    Number of children: N/A    Years of education: N/A     Occupational History    Not on file. Social History Main Topics    Smoking status: Current Every Day Smoker     Packs/day: 1.00    Smokeless tobacco: Never Used    Alcohol use No    Drug use: No    Sexual activity: Not on file     Other Topics Concern    Not on file     Social History Narrative         ALLERGIES: Review of patient's allergies indicates no known allergies. Review of Systems   Constitutional: Negative for chills and fever. HENT: Negative for rhinorrhea and sore throat. Eyes: Negative for visual disturbance. Respiratory: Negative for cough and shortness of breath. Cardiovascular: Negative for chest pain and leg swelling. Gastrointestinal: Negative for abdominal pain, diarrhea, nausea and vomiting. Genitourinary: Negative for dysuria. Musculoskeletal: Negative for back pain and neck pain. Skin: Negative for rash. Swelling under right armpit. Neurological: Negative for weakness and headaches. Psychiatric/Behavioral: The patient is not nervous/anxious. Vitals:    09/20/17 1954   BP: 121/74   Pulse: 96   Resp: 15   Temp: 98.2 °F (36.8 °C)   SpO2: 99%   Weight: 54.4 kg (120 lb)   Height: 5' 6\" (1.676 m)            Physical Exam   Constitutional: She is oriented to person, place, and time. She appears well-developed and well-nourished. HENT:   Head: Normocephalic. Right Ear: External ear normal.   Left Ear: External ear normal.   Eyes: Conjunctivae and EOM are normal. Pupils are equal, round, and reactive to light. Neck: Normal range of motion. Neck supple. No tracheal deviation present. Cardiovascular: Normal rate, regular rhythm, normal heart sounds and intact distal pulses. No murmur heard. Pulmonary/Chest: Effort normal and breath sounds normal. No respiratory distress. She has no wheezes. She has no rales. Easy work of breathing, no wheezing or rales. Abdominal: Soft. There is no tenderness. Musculoskeletal: Normal range of motion. Patient with hard 1 inch diameter nodule under right armpit. No redness or erythema, no fluctuance to suggest abscess. Lesion is firm to palpation, feels consistent with swollen lymph node. Neurological: She is alert and oriented to person, place, and time. No cranial nerve deficit. Skin: No rash noted. Nursing note and vitals reviewed.        MDM  Number of Diagnoses or Management Options     Amount and/or Complexity of Data Reviewed  Clinical lab tests: ordered and reviewed  Tests in the radiology section of CPT®: ordered and reviewed  Review and summarize past medical records: yes    Risk of Complications, Morbidity, and/or Mortality  Presenting problems: high  Diagnostic procedures: high  Management options: high    Patient Progress  Patient progress: stable    ED Course       Procedures    Recent Results (from the past 12 hour(s))   CBC WITH AUTOMATED DIFF    Collection Time: 09/20/17  9:47 PM   Result Value Ref Range    WBC 10.6 4.3 - 11.1 K/uL    RBC 5.06 4.05 - 5.25 M/uL    HGB 15.5 (H) 11.7 - 15.4 g/dL    HCT 46.3 35.8 - 46.3 %    MCV 91.5 79.6 - 97.8 FL    MCH 30.6 26.1 - 32.9 PG    MCHC 33.5 31.4 - 35.0 g/dL    RDW 12.8 11.9 - 14.6 %    PLATELET 851 248 - 634 K/uL    MPV 10.4 (L) 10.8 - 14.1 FL    DF AUTOMATED      NEUTROPHILS 69 43 - 78 %    LYMPHOCYTES 19 13 - 44 %    MONOCYTES 8 4.0 - 12.0 %    EOSINOPHILS 3 0.5 - 7.8 %    BASOPHILS 0 0.0 - 2.0 %    IMMATURE GRANULOCYTES 0.7 0.0 - 5.0 %    ABS. NEUTROPHILS 7.4 1.7 - 8.2 K/UL    ABS. LYMPHOCYTES 2.0 0.5 - 4.6 K/UL    ABS. MONOCYTES 0.9 0.1 - 1.3 K/UL    ABS. EOSINOPHILS 0.3 0.0 - 0.8 K/UL    ABS. BASOPHILS 0.0 0.0 - 0.2 K/UL    ABS. IMM. GRANS. 0.1 0.0 - 0.5 K/UL   METABOLIC PANEL, BASIC    Collection Time: 09/20/17  9:47 PM   Result Value Ref Range    Sodium 138 136 - 145 mmol/L    Potassium 3.7 3.5 - 5.1 mmol/L    Chloride 103 98 - 107 mmol/L    CO2 27 21 - 32 mmol/L    Anion gap 8 7 - 16 mmol/L    Glucose 88 65 - 100 mg/dL    BUN 5 (L) 6 - 23 MG/DL    Creatinine 0.71 0.6 - 1.0 MG/DL    GFR est AA >60 >60 ml/min/1.73m2    GFR est non-AA >60 >60 ml/min/1.73m2    Calcium 8.6 8.3 - 10.4 MG/DL     Xr Chest Pa Lat    Result Date: 9/20/2017  Chest 2 view CLINICAL INDICATION: Acute moderate dyspnea with cough, chills, smoker COMPARISON: CT abdomen 7/12/2017 TECHNIQUE: Upright PA and lateral views of the chest FINDINGS: Lung volumes are well inflated. Cardiomediastinal silhouette and hilar contours within normal limits. The lungs demonstrate no consolidation, pleural effusion, pneumothorax or pulmonary edema. The central airways are unremarkable. No acute osseous abnormalities are seen. IMPRESSION: Unremarkable chest radiograph. 43 yo female with cough and swelling under arm:          The patient is very well appearing, in no acute distress, I have examined the lump under her armpit which is relatively small, it is hard, nonfluctuant, and an ultrasound is vascularized without fluctuance. I feel this is likely a lymph node however could be a small developing abscess. I discussed with her that we will start doxycycline and she is to return to her primary care provider or to the emergency department in 1-2 days for recheck. She does have some psoriatic changes to her right elbow however no cat bites, no other wounds to explain the possible lymph node.   She does not have any shortness of breath, no chest pain, denies lumps of her breast. Did discuss that she will need further workup if there is not for resolution with antibiotics including possible mammogram.

## 2017-12-04 ENCOUNTER — HOSPITAL ENCOUNTER (EMERGENCY)
Age: 37
Discharge: HOME | End: 2017-12-04
Attending: EMERGENCY MEDICINE
Payer: SELF-PAY

## 2017-12-04 VITALS
WEIGHT: 125 LBS | DIASTOLIC BLOOD PRESSURE: 76 MMHG | TEMPERATURE: 98 F | HEART RATE: 70 BPM | RESPIRATION RATE: 18 BRPM | BODY MASS INDEX: 20.09 KG/M2 | OXYGEN SATURATION: 98 % | HEIGHT: 66 IN | SYSTOLIC BLOOD PRESSURE: 122 MMHG

## 2017-12-04 DIAGNOSIS — L73.9 FOLLICULITIS: Primary | ICD-10-CM

## 2017-12-04 DIAGNOSIS — N76.0 BV (BACTERIAL VAGINOSIS): ICD-10-CM

## 2017-12-04 DIAGNOSIS — B96.89 BV (BACTERIAL VAGINOSIS): ICD-10-CM

## 2017-12-04 LAB
SERVICE CMNT-IMP: NORMAL
WET PREP GENITAL: NORMAL

## 2017-12-04 PROCEDURE — 81003 URINALYSIS AUTO W/O SCOPE: CPT | Performed by: NURSE PRACTITIONER

## 2017-12-04 PROCEDURE — 99284 EMERGENCY DEPT VISIT MOD MDM: CPT | Performed by: NURSE PRACTITIONER

## 2017-12-04 PROCEDURE — 87491 CHLMYD TRACH DNA AMP PROBE: CPT | Performed by: NURSE PRACTITIONER

## 2017-12-04 PROCEDURE — 87210 SMEAR WET MOUNT SALINE/INK: CPT | Performed by: NURSE PRACTITIONER

## 2017-12-04 RX ORDER — CLINDAMYCIN HYDROCHLORIDE 150 MG/1
300 CAPSULE ORAL 2 TIMES DAILY
Qty: 28 CAP | Refills: 0 | Status: SHIPPED | OUTPATIENT
Start: 2017-12-04 | End: 2017-12-11

## 2017-12-04 RX ORDER — CHLORHEXIDINE GLUCONATE 4 G/100ML
1 SOLUTION TOPICAL 2 TIMES DAILY
Qty: 240 ML | Refills: 2 | Status: SHIPPED | OUTPATIENT
Start: 2017-12-04 | End: 2017-12-05

## 2017-12-04 NOTE — ED PROVIDER NOTES
HPI Comments: Patient presents with vaginal itching, redness, tenderness and pustules to her vaginal and buttocks area. She states symptoms started \"a while ago\". She states she has tried apple vinegar soaks and coconut oil to her vaginal area. She states she does have history of psoriasis in her vaginal area but symptoms have gotten worse since onset of itching. She denies vaginal discharge. She states similar symptoms in the past when she had BV. Patient is a 42 y.o. female presenting with vaginal itching. The history is provided by the patient. Vaginal Itching    This is a new problem. The current episode started more than 1 week ago. The problem occurs constantly. The problem has been gradually worsening. The discharge occurs spontaneously. She is not pregnant. She has not missed her period. Associated symptoms include genital itching. Pertinent negatives include no anorexia, no diaphoresis, no fever, no abdominal swelling, no abdominal pain, no constipation, no diarrhea, no nausea, no vomiting, no dyspareunia, no dysuria, no frequency, no genital burning, no genital lesions, no perineal pain and no perineal odor. Treatments tried: coconut oil and apple cider vinegarr. The treatment provided no relief. Past Medical History:   Diagnosis Date    Arthritis        Past Surgical History:   Procedure Laterality Date    HX GYN          HX ORTHOPAEDIC      cyst behind right knee         History reviewed. No pertinent family history. Social History     Social History    Marital status: LEGALLY      Spouse name: N/A    Number of children: N/A    Years of education: N/A     Occupational History    Not on file.      Social History Main Topics    Smoking status: Current Every Day Smoker     Packs/day: 1.00    Smokeless tobacco: Never Used    Alcohol use No    Drug use: No    Sexual activity: Not on file     Other Topics Concern    Not on file     Social History Narrative ALLERGIES: Review of patient's allergies indicates no known allergies. Review of Systems   Constitutional: Negative for chills, diaphoresis and fever. Respiratory: Negative for cough and shortness of breath. Gastrointestinal: Negative for abdominal pain, anorexia, constipation, diarrhea, nausea and vomiting. Genitourinary: Positive for vaginal pain. Negative for dyspareunia, dysuria, frequency, vaginal bleeding and vaginal discharge. Musculoskeletal: Negative for arthralgias and myalgias. Neurological: Negative for dizziness, seizures, weakness and numbness. Vitals:    12/04/17 0940 12/04/17 1053   BP: 136/88 122/76   Pulse: (!) 103 70   Resp: 16 18   Temp: 98.5 °F (36.9 °C) 98 °F (36.7 °C)   SpO2: 100% 98%   Weight: 56.7 kg (125 lb)    Height: 5' 6\" (1.676 m)             Physical Exam   Constitutional: She is oriented to person, place, and time. She appears well-developed and well-nourished. No distress. Cardiovascular: Normal rate and regular rhythm. No murmur heard. Pulmonary/Chest: Effort normal and breath sounds normal.   Abdominal: Soft. There is no tenderness. Genitourinary: No labial fusion. There is rash on the right labia. There is rash on the left labia. There is erythema and tenderness in the vagina. No bleeding in the vagina. No vaginal discharge found. Musculoskeletal: Normal range of motion. Neurological: She is alert and oriented to person, place, and time. Skin: Skin is warm and dry. Rash noted. Rash is pustular. She is not diaphoretic. There is erythema. No pallor. Psychiatric: She has a normal mood and affect. Her behavior is normal.   Nursing note and vitals reviewed.        Recent Results (from the past 12 hour(s))   WET PREP    Collection Time: 12/04/17 10:00 AM   Result Value Ref Range    Special Requests: NO SPECIAL REQUESTS      Wet prep 0 TO 1 WBC PER HPF     Wet prep FEW  CLUE CELLS PRESENT        Wet prep NO YEAST SEEN      Wet prep NO TRICHOMONAS SEEN         MDM  Number of Diagnoses or Management Options  BV (bacterial vaginosis): Folliculitis:   Diagnosis management comments: Wet prep positive for clue cells. Patient given prescription for clindamycin and Hibiclens soap. Patient referred to the [facility] clinic for follow up. Amount and/or Complexity of Data Reviewed  Clinical lab tests: ordered and reviewed    Patient Progress  Patient progress: stable    ED Course       Pelvic Exam  Date/Time: 12/4/2017 11:24 AM  Performed by: NP  Exam assisted by:  Mandeep Funez. External genitalia appearance: rash. Vaginal exam:  normal.    Cervical exam:  normal.    Specimen(s) collected:  chlamydia, GC and vaginal culture.   Bimanual exam:  normal.    Patient tolerance: Patient tolerated the procedure well with no immediate complications

## 2017-12-04 NOTE — DISCHARGE INSTRUCTIONS
Bacterial Vaginosis: Care Instructions  Your Care Instructions    Bacterial vaginosis is a type of vaginal infection. It is caused by excess growth of certain bacteria that are normally found in the vagina. Symptoms can include itching, swelling, pain when you urinate or have sex, and a gray or yellow discharge with a \"fishy\" odor. It is not considered an infection that is spread through sexual contact. Although symptoms can be annoying and uncomfortable, bacterial vaginosis does not usually cause other health problems. However, if you have it while you are pregnant, it can cause complications. While the infection may go away on its own, most doctors use antibiotics to treat it. You may have been prescribed pills or vaginal cream. With treatment, bacterial vaginosis usually clears up in 5 to 7 days. Follow-up care is a key part of your treatment and safety. Be sure to make and go to all appointments, and call your doctor if you are having problems. It's also a good idea to know your test results and keep a list of the medicines you take. How can you care for yourself at home? · Take your antibiotics as directed. Do not stop taking them just because you feel better. You need to take the full course of antibiotics. · Do not eat or drink anything that contains alcohol if you are taking metronidazole (Flagyl). · Keep using your medicine if you start your period. Use pads instead of tampons while using a vaginal cream or suppository. Tampons can absorb the medicine. · Wear loose cotton clothing. Do not wear nylon and other materials that hold body heat and moisture close to the skin. · Do not scratch. Relieve itching with a cold pack or a cool bath. · Do not wash your vaginal area more than once a day. Use plain water or a mild, unscented soap. Do not douche. When should you call for help?   Watch closely for changes in your health, and be sure to contact your doctor if:  ? · You have unexpected vaginal bleeding. ? · You have a fever. ? · You have new or increased pain in your vagina or pelvis. ? · You are not getting better after 1 week. ? · Your symptoms return after you finish the course of your medicine. Where can you learn more? Go to http://dalila-salena.info/. Jo Perez in the search box to learn more about \"Bacterial Vaginosis: Care Instructions. \"  Current as of: October 13, 2016  Content Version: 11.4  © 4874-0200 [employer]. Care instructions adapted under license by [employer] (which disclaims liability or warranty for this information). If you have questions about a medical condition or this instruction, always ask your healthcare professional. [address] any warranty or liability for your use of this information.

## 2017-12-04 NOTE — ED TRIAGE NOTES
Patient states vaginal itching and boils to buttocks and vaginal area states pain. States history of psoriasis.

## 2017-12-05 LAB
C TRACH RRNA SPEC QL NAA+PROBE: NEGATIVE
N GONORRHOEA RRNA SPEC QL NAA+PROBE: NEGATIVE
SPECIMEN SOURCE: NORMAL

## 2017-12-20 ENCOUNTER — HOSPITAL ENCOUNTER (EMERGENCY)
Age: 37
Discharge: HOME | End: 2017-12-20
Attending: EMERGENCY MEDICINE
Payer: SELF-PAY

## 2017-12-20 ENCOUNTER — APPOINTMENT (OUTPATIENT)
Dept: GENERAL RADIOLOGY | Age: 37
End: 2017-12-20
Attending: EMERGENCY MEDICINE
Payer: SELF-PAY

## 2017-12-20 VITALS
WEIGHT: 125 LBS | HEIGHT: 66 IN | OXYGEN SATURATION: 98 % | DIASTOLIC BLOOD PRESSURE: 82 MMHG | BODY MASS INDEX: 20.09 KG/M2 | SYSTOLIC BLOOD PRESSURE: 118 MMHG | HEART RATE: 84 BPM | RESPIRATION RATE: 19 BRPM | TEMPERATURE: 98.8 F

## 2017-12-20 DIAGNOSIS — J11.1 INFLUENZA-LIKE ILLNESS: Primary | ICD-10-CM

## 2017-12-20 LAB
BACTERIA URNS QL MICRO: ABNORMAL /HPF
CASTS URNS QL MICRO: ABNORMAL /LPF
EPI CELLS #/AREA URNS HPF: ABNORMAL /HPF
RBC #/AREA URNS HPF: ABNORMAL /HPF
WBC URNS QL MICRO: ABNORMAL /HPF

## 2017-12-20 PROCEDURE — 71020 XR CHEST PA LAT: CPT

## 2017-12-20 PROCEDURE — 99284 EMERGENCY DEPT VISIT MOD MDM: CPT | Performed by: EMERGENCY MEDICINE

## 2017-12-20 PROCEDURE — 81003 URINALYSIS AUTO W/O SCOPE: CPT | Performed by: EMERGENCY MEDICINE

## 2017-12-20 PROCEDURE — 81015 MICROSCOPIC EXAM OF URINE: CPT | Performed by: EMERGENCY MEDICINE

## 2017-12-20 RX ORDER — BROMPHENIRAMINE MALEATE, PSEUDOEPHEDRINE HYDROCHLORIDE, AND DEXTROMETHORPHAN HYDROBROMIDE 2; 30; 10 MG/5ML; MG/5ML; MG/5ML
5-10 SYRUP ORAL
Qty: 250 ML | Refills: 0 | OUTPATIENT
Start: 2017-12-20 | End: 2019-12-29

## 2017-12-20 NOTE — LETTER
[facility] [facility] EMERGENCY DEPT [address] [id number] [phone] Work/School Note Date: 12/20/2017 To Whom It May concern: 
 
Toshia was seen and treated today in the emergency room by the following provider(s): 
No providers found. Toshia may return to work on 12/23/17. Sincerely, Marc Preston, DO

## 2017-12-20 NOTE — LETTER
[facility] EMERGENCY DEPT One [address] [id number] [phone] Work/School Note Date: 12/20/2017 To Whom It May concern: 
 
Radha Granados was seen and treated today in the emergency room by the following provider(s): 
Attending Provider: Verito Higuera may return to work on 12/21/17. Sincerely, Lena Marcus, DO

## 2017-12-20 NOTE — ED PROVIDER NOTES
Patient is a 42 y.o. female presenting with cough. The history is provided by the patient. Cough   This is a new problem. The current episode started yesterday. The problem occurs constantly. The problem has not changed since onset. The cough is productive of sputum. There has been no fever. Associated symptoms include rhinorrhea and sore throat. Pertinent negatives include no chest pain, no chills, no sweats, no weight loss, no eye redness, no ear congestion, no ear pain, no headaches, no myalgias, no shortness of breath, no wheezing, no nausea, no vomiting and no confusion. She has tried cough syrup for the symptoms. The treatment provided no relief. She is a smoker. Past Medical History:   Diagnosis Date    Arthritis        Past Surgical History:   Procedure Laterality Date    HX GYN          HX ORTHOPAEDIC      cyst behind right knee         No family history on file. Social History     Social History    Marital status: LEGALLY      Spouse name: N/A    Number of children: N/A    Years of education: N/A     Occupational History    Not on file. Social History Main Topics    Smoking status: Current Every Day Smoker     Packs/day: 1.00    Smokeless tobacco: Never Used    Alcohol use No    Drug use: No    Sexual activity: Not on file     Other Topics Concern    Not on file     Social History Narrative         ALLERGIES: Review of patient's allergies indicates no known allergies. Review of Systems   Constitutional: Negative for chills and weight loss. HENT: Positive for rhinorrhea and sore throat. Negative for ear pain. Eyes: Negative for redness. Respiratory: Positive for cough. Negative for shortness of breath and wheezing. Cardiovascular: Negative for chest pain. Gastrointestinal: Negative for nausea and vomiting. Musculoskeletal: Negative for myalgias. Neurological: Negative for headaches. Psychiatric/Behavioral: Negative for confusion.    All other systems reviewed and are negative. Vitals:    12/20/17 0752   BP: 135/77   Pulse: (!) 108   Resp: 18   Temp: 98.8 °F (37.1 °C)   SpO2: 98%   Weight: 56.7 kg (125 lb)   Height: 5' 6\" (1.676 m)            Physical Exam   Constitutional: She is oriented to person, place, and time. She appears well-developed and well-nourished. No distress. HENT:   Head: Normocephalic and atraumatic. Right Ear: External ear normal.   Left Ear: External ear normal.   Nose: Nose normal.   Mouth/Throat: Oropharynx is clear and moist. No oropharyngeal exudate. Eyes: Conjunctivae and EOM are normal. Pupils are equal, round, and reactive to light. Neck: Normal range of motion. Neck supple. Cardiovascular: Normal rate, regular rhythm and normal heart sounds. Pulmonary/Chest: Effort normal and breath sounds normal. No respiratory distress. She has no wheezes. Lymphadenopathy:     She has no cervical adenopathy. Neurological: She is alert and oriented to person, place, and time. Skin: Skin is warm and dry. Psychiatric: She has a normal mood and affect. Her behavior is normal.   Nursing note and vitals reviewed.        MDM  Number of Diagnoses or Management Options     Amount and/or Complexity of Data Reviewed  Clinical lab tests: ordered and reviewed  Tests in the radiology section of CPT®: ordered and reviewed    Risk of Complications, Morbidity, and/or Mortality  Presenting problems: low  Diagnostic procedures: low  Management options: low    Patient Progress  Patient progress: stable    ED Course       Procedures

## 2017-12-20 NOTE — ED NOTES
I have reviewed discharge instructions with the patient. The patient verbalized understanding. Patient left ED via Discharge Method: ambulatory to Home. Opportunity for questions and clarification provided. Patient given 1 scripts. To continue your aftercare when you leave the hospital, you may receive an automated call from our care team to check in on how you are doing. This is a free service and part of our promise to provide the best care and service to meet your aftercare needs.  If you have questions, or wish to unsubscribe from this service please call [phone]. Thank you for Choosing our [facility].

## 2018-09-13 ENCOUNTER — HOSPITAL ENCOUNTER (EMERGENCY)
Age: 38
Discharge: HOME | End: 2018-09-13
Attending: EMERGENCY MEDICINE
Payer: COMMERCIAL

## 2018-09-13 VITALS
BODY MASS INDEX: 20.09 KG/M2 | TEMPERATURE: 98.2 F | HEIGHT: 66 IN | OXYGEN SATURATION: 99 % | RESPIRATION RATE: 18 BRPM | HEART RATE: 103 BPM | DIASTOLIC BLOOD PRESSURE: 90 MMHG | WEIGHT: 125 LBS | SYSTOLIC BLOOD PRESSURE: 131 MMHG

## 2018-09-13 DIAGNOSIS — H66.92 LEFT OTITIS MEDIA, UNSPECIFIED OTITIS MEDIA TYPE: Primary | ICD-10-CM

## 2018-09-13 PROCEDURE — 99282 EMERGENCY DEPT VISIT SF MDM: CPT | Performed by: NURSE PRACTITIONER

## 2018-09-13 RX ORDER — AMOXICILLIN 500 MG/1
500 TABLET, FILM COATED ORAL 3 TIMES DAILY
Qty: 21 TAB | Refills: 0 | OUTPATIENT
Start: 2018-09-13 | End: 2019-12-29
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
## 2018-09-13 NOTE — Clinical Note
Home with family  Take meds as directed. Follow guidelines discussed with   Follow with family md for continued care.

## 2018-09-13 NOTE — DISCHARGE INSTRUCTIONS
Ear Infection (Otitis Media): Care Instructions  Your Care Instructions    An ear infection may start with a cold and affect the middle ear (otitis media). It can hurt a lot. Most ear infections clear up on their own in a couple of days. Most often you will not need antibiotics. This is because many ear infections are caused by a virus. Antibiotics don't work against a virus. Regular doses of pain medicines are the best way to reduce your fever and help you feel better. Follow-up care is a key part of your treatment and safety. Be sure to make and go to all appointments, and call your doctor if you are having problems. It's also a good idea to know your test results and keep a list of the medicines you take. How can you care for yourself at home? · Take pain medicines exactly as directed. ¨ If the doctor gave you a prescription medicine for pain, take it as prescribed. ¨ If you are not taking a prescription pain medicine, take an over-the-counter medicine, such as acetaminophen (Tylenol), ibuprofen (Advil, Motrin), or naproxen (Aleve). Read and follow all instructions on the label. ¨ Do not take two or more pain medicines at the same time unless the doctor told you to. Many pain medicines have acetaminophen, which is Tylenol. Too much acetaminophen (Tylenol) can be harmful. · Plan to take a full dose of pain reliever before bedtime. Getting enough sleep will help you get better. · Try a warm, moist washcloth on the ear. It may help relieve pain. · If your doctor prescribed antibiotics, take them as directed. Do not stop taking them just because you feel better. You need to take the full course of antibiotics. When should you call for help?   Call your doctor now or seek immediate medical care if:    · You have new or increasing ear pain.     · You have new or increasing pus or blood draining from your ear.     · You have a fever with a stiff neck or a severe headache.    Watch closely for changes in your health, and be sure to contact your doctor if:    · You have new or worse symptoms.     · You are not getting better after taking an antibiotic for 2 days. Where can you learn more? Go to http://dalila-salena.info/. Enter P789 in the search box to learn more about \"Ear Infection (Otitis Media): Care Instructions. \"  Current as of: May 12, 2017  Content Version: 11.7  © 8836-7673 [employer]. Care instructions adapted under license by [employer] (which disclaims liability or warranty for this information). If you have questions about a medical condition or this instruction, always ask your healthcare professional. [address] any warranty or liability for your use of this information. Domestic Violence Safety Instructions: Care Instructions  Your Care Instructions    If you want to save this information but don't think it is safe to take it home, see if a trusted friend can keep it for you. Plan ahead. Know who you can call for help, and memorize the phone number. Be careful online too. Your online activity may be seen by others. Do not use your personal computer or device to read about this topic. Use a safe computer such as one at work, a friend's house, or a [employer]. When you are abused by a spouse or partner, you can take actions to protect yourself and your children. You can increase your safety whether you decide to stay with your spouse or partner or you decide to leave. You can also prepare an action plan and kit ahead of time. This will allow you to leave quickly when you decide to. Remember, you cannot stop your partner's abuse, but you can find help for you and your children. No one deserves to be abused. Follow-up care is a key part of your treatment and safety. Be sure to make and go to all appointments, and call your doctor if you are having problems.  It's also a good idea to know your test results and keep a list of the medicines you take. How can you care for yourself at home? Make a plan for your safety  · If you decide to stay with your abusive spouse or partner, you can do the following to increase your safety:  ¨ Decide what works best to keep you safe in an emergency. ¨ Decide who you can call to help you in an emergency. ¨ Decide if you will call the police if you get hurt again. If you can, agree on a signal with your children or neighbor to call the police for you if you need help. You can flash lights or hang something out of a window. ¨ Choose a place to go for a short time if you need to leave home. Memorize the address and phone number. ¨ Learn escape routes out of your home in case you need to leave in a hurry. Teach your children different ways to get out of the house quickly if they need to. ¨ Take objects that can be used as weapons (guns, knives, hammers) and hide them or lock them up. ¨ Learn the number of a domestic violence shelter. Talk to the people there about how they can help. ¨ Find out about other community resources that can help you. ¨ Take pictures of bruises or other injuries if you can. You can also take pictures of things your abuser has broken. ¨ Teach your children that violence is never okay. Tell them that they do not deserve to be hurt. Pack a bag  · Prepare a kit with things you will need if you leave the house suddenly. You can try to hide this in your house, or you can leave it with a friend or relative you can trust. You should include the following items in the kit:  ¨ A set of keys to your house and car. [facility] phone numbers and addresses. You might also want to have a map and a small flashlight in case you need to leave in the night. ¨ Money such as cash or checks. You can also ask a trusted friend or family member to hold money for you.   ¨ Copies of legal documents such as house and car titles or rent receipts, birth certificates, Social Security card, voter registration, marriage and 's licenses, and your children's health records. ¨ Personal items you would need for a few days, such as clothes, a toothbrush, toothpaste, and any medicines you or your children need. ¨ A favorite toy or book for your child or children. ¨ Diapers and bottles, if you have very young children. ¨ Pictures that show signs of abuse and violence. You may also add pictures of your abuser. If you leave  · If you decide to leave, you can take the following steps:  ¨ Go to the emergency room at a hospital if you have been hurt. ¨ Ask the police to be with you as you leave. They can protect you as you leave the house. ¨ If you decide to leave secretly, remember that activities can be tracked. Your abuser may still have access to your cell phone, email, and credit cards. It may be possible for these to be traced. Always be aware of your surroundings. ¨ Take the kit you have prepared. If this is an emergency, do not worry about gathering up anything. Just leave-your safety is most important. ¨ If your abuser moves out, change the locks on the doors. If you have a security system, change the access code. When should you call for help? Call 911 anytime you think you may need emergency care. For example, call if:    · You or someone else has just been abused.     · You think you or someone else is in danger of being abused.    Watch closely for changes in your health, and be sure to contact your doctor if you have any problems. Where can you learn more? Go to http://dalila-salena.info/. Enter A752 in the search box to learn more about \"Domestic Violence Safety Instructions: Care Instructions. \"  Current as of: October 10, 2017  Content Version: 11.7  © 2634-8794 [employer]. Care instructions adapted under license by [employer] (which disclaims liability or warranty for this information).  If you have questions about a medical condition or this instruction, always ask your healthcare professional. [place] [address] any warranty or liability for your use of this information.

## 2018-12-29 ENCOUNTER — HOSPITAL ENCOUNTER (EMERGENCY)
Age: 38
Discharge: HOME | End: 2018-12-29
Attending: EMERGENCY MEDICINE
Payer: SELF-PAY

## 2018-12-29 VITALS
SYSTOLIC BLOOD PRESSURE: 133 MMHG | DIASTOLIC BLOOD PRESSURE: 97 MMHG | HEIGHT: 66 IN | RESPIRATION RATE: 18 BRPM | BODY MASS INDEX: 20.09 KG/M2 | WEIGHT: 125 LBS | OXYGEN SATURATION: 100 % | TEMPERATURE: 98 F | HEART RATE: 98 BPM

## 2018-12-29 DIAGNOSIS — J06.9 VIRAL UPPER RESPIRATORY INFECTION: Primary | ICD-10-CM

## 2018-12-29 LAB
DEPRECATED S PYO AG THROAT QL EIA: NEGATIVE
FLUAV AG NPH QL IA: NEGATIVE
FLUBV AG NPH QL IA: NEGATIVE
SPECIMEN SOURCE: NORMAL

## 2018-12-29 PROCEDURE — 87804 INFLUENZA ASSAY W/OPTIC: CPT

## 2018-12-29 PROCEDURE — 87081 CULTURE SCREEN ONLY: CPT

## 2018-12-29 PROCEDURE — 99283 EMERGENCY DEPT VISIT LOW MDM: CPT | Performed by: PHYSICIAN ASSISTANT

## 2018-12-29 PROCEDURE — 87880 STREP A ASSAY W/OPTIC: CPT

## 2018-12-29 NOTE — DISCHARGE INSTRUCTIONS
Viral Respiratory Infection: Care Instructions  Your Care Instructions    Viruses are very small organisms. They grow in number after they enter your body. There are many types that cause different illnesses, such as colds and the mumps. The symptoms of a viral respiratory infection often start quickly. They include a fever, sore throat, and runny nose. You may also just not feel well. Or you may not want to eat much. Most viral respiratory infections are not serious. They usually get better with time and self-care. Antibiotics are not used to treat a viral infection. That's because antibiotics will not help cure a viral illness. In some cases, antiviral medicine can help your body fight a serious viral infection. Follow-up care is a key part of your treatment and safety. Be sure to make and go to all appointments, and call your doctor if you are having problems. It's also a good idea to know your test results and keep a list of the medicines you take. How can you care for yourself at home? · Rest as much as possible until you feel better. · Be safe with medicines. Take your medicine exactly as prescribed. Call your doctor if you think you are having a problem with your medicine. You will get more details on the specific medicine your doctor prescribes. · Take an over-the-counter pain medicine, such as acetaminophen (Tylenol), ibuprofen (Advil, Motrin), or naproxen (Aleve), as needed for pain and fever. Read and follow all instructions on the label. Do not give aspirin to anyone younger than 20. It has been linked to Reye syndrome, a serious illness. · Drink plenty of fluids, enough so that your urine is light yellow or clear like water. Hot fluids, such as tea or soup, may help relieve congestion in your nose and throat. If you have kidney, heart, or liver disease and have to limit fluids, talk with your doctor before you increase the amount of fluids you drink.   · Try to clear mucus from your lungs by breathing deeply and coughing. · Gargle with warm salt water once an hour. This can help reduce swelling and throat pain. Use 1 teaspoon of salt mixed in 1 cup of warm water. · Do not smoke or allow others to smoke around you. If you need help quitting, talk to your doctor about stop-smoking programs and medicines. These can increase your chances of quitting for good. To avoid spreading the virus  · Cough or sneeze into a tissue. Then throw the tissue away. · If you don't have a tissue, use your hand to cover your cough or sneeze. Then clean your hand. You can also cough into your sleeve. · Wash your hands often. Use soap and warm water. Wash for 15 to 20 seconds each time. · If you don't have soap and water near you, you can clean your hands with alcohol wipes or gel. When should you call for help? Call your doctor now or seek immediate medical care if:    · You have a new or higher fever.     · Your fever lasts more than 48 hours.     · You have trouble breathing.     · You have a fever with a stiff neck or a severe headache.     · You are sensitive to light.     · You feel very sleepy or confused.    Watch closely for changes in your health, and be sure to contact your doctor if:    · You do not get better as expected. Where can you learn more? Go to http://dalila-salena.info/. Enter D622 in the search box to learn more about \"Viral Respiratory Infection: Care Instructions. \"  Current as of: December 6, 2017  Content Version: 11.8  © 0624-7926 [employer]. Care instructions adapted under license by [employer] (which disclaims liability or warranty for this information). If you have questions about a medical condition or this instruction, always ask your healthcare professional. [address] any warranty or liability for your use of this information.

## 2018-12-29 NOTE — ED TRIAGE NOTES
Patient states she has been sick since Wednesday. States she has been coughing green sputum today. Reports fever and chills.

## 2018-12-29 NOTE — LETTER
[facility] EMERGENCY DEPT [address] [id number] [phone] Work/School Note Date: 12/29/2018 To Whom It May concern: 
 
Priya Marcum was seen and treated today in the emergency room by the following provider(s): 
Attending Provider: Sunday Runner, MD 
Physician Assistant: AICHA Singh.   
 
Priya Marcum may return to work on 01/02/18. Sincerely, AICHA Bradley

## 2018-12-29 NOTE — ED NOTES
I have reviewed discharge instructions with the patient. The patient verbalized understanding. Patient left ED via Discharge Method: ambulatory to Home with self. Opportunity for questions and clarification provided. Patient given 0 scripts. To continue your aftercare when you leave the hospital, you may receive an automated call from our care team to check in on how you are doing. This is a free service and part of our promise to provide the best care and service to meet your aftercare needs.  If you have questions, or wish to unsubscribe from this service please call [phone]. Thank you for Choosing our [facility] Emergency Department.

## 2018-12-29 NOTE — ED PROVIDER NOTES
Patient is here with nasal congestion, sore throat, dry cough, body aches, chills, fever and not feeling well for 3 days now. She did not get a flu shot. Someone at work was sick with the same. No chest pain or shortness of breath, abdominal pain, dizziness, dyspnea on exertion, orthopnea, swelling of her arms or legs, trouble with urination or bowel movements or other symptoms. She was ambulatory to the room without difficulty, and well-hydrated. The history is provided by the patient. Flu This is a new problem. The current episode started more than 2 days ago (3 days now). The problem occurs every few minutes. The problem has not changed since onset. The cough is non-productive. Patient reports a subjective fever - was not measured. Associated symptoms include chills, sweats, rhinorrhea and sore throat. Pertinent negatives include no chest pain, no weight loss, no eye redness, no ear congestion, no ear pain, no headaches, no myalgias, no shortness of breath, no wheezing, no nausea, no vomiting and no confusion. She has tried nothing for the symptoms. She is a smoker. Past Medical History:  
Diagnosis Date  Arthritis Past Surgical History:  
Procedure Laterality Date  HX GYN    
   HX ORTHOPAEDIC    
 cyst behind right knee No family history on file. Social History Socioeconomic History  Marital status: SINGLE Spouse name: Not on file  Number of children: Not on file  Years of education: Not on file  Highest education level: Not on file Social Needs  Financial resource strain: Not on file  Food insecurity - worry: Not on file  Food insecurity - inability: Not on file  Transportation needs - medical: Not on file  Transportation needs - non-medical: Not on file Occupational History  Not on file Tobacco Use  Smoking status: Current Every Day Smoker Packs/day: 1.00  Smokeless tobacco: Never Used Substance and Sexual Activity  Alcohol use: No  
 Drug use: No  
 Sexual activity: Not on file Other Topics Concern  Not on file Social History Narrative  Not on file ALLERGIES: Patient has no known allergies. Review of Systems Constitutional: Positive for chills. Negative for weight loss. HENT: Positive for congestion, rhinorrhea and sore throat. Negative for dental problem, drooling, ear discharge, ear pain, facial swelling, hearing loss, mouth sores, nosebleeds, postnasal drip, sinus pressure, sinus pain, sneezing, tinnitus, trouble swallowing and voice change. Eyes: Negative. Negative for redness. Respiratory: Positive for cough. Negative for shortness of breath and wheezing. Cardiovascular: Negative. Negative for chest pain. Gastrointestinal: Negative. Negative for nausea and vomiting. Genitourinary: Negative. Musculoskeletal: Negative. Negative for myalgias. Skin: Negative. Neurological: Negative. Negative for headaches. Psychiatric/Behavioral: Negative. Negative for confusion. All other systems reviewed and are negative. Vitals:  
 12/29/18 1140 BP: 137/88 Pulse: 100 Resp: 16 Temp: 98 °F (36.7 °C) SpO2: 98% Weight: 56.7 kg (125 lb) Height: 5' 6\" (1.676 m) Physical Exam  
Constitutional: She is oriented to person, place, and time. She appears well-developed and well-nourished. HENT:  
Head: Normocephalic and atraumatic. Right Ear: External ear normal.  
Left Ear: External ear normal.  
Nose: Nose normal.  
Posterior pharyngeal erythema, mild swelling and exudate. Mild tenderness to anterior cervical lymphnodes bilaterally. Eyes: Conjunctivae and EOM are normal. Pupils are equal, round, and reactive to light. Neck: Normal range of motion. Neck supple. Cardiovascular: Normal rate, regular rhythm, normal heart sounds and intact distal pulses. Pulmonary/Chest: Effort normal and breath sounds normal. No stridor. No respiratory distress. She has no wheezes. She has no rales. She exhibits no tenderness. Abdominal: Soft. Bowel sounds are normal.  
Musculoskeletal: Normal range of motion. Neurological: She is alert and oriented to person, place, and time. She has normal reflexes. Skin: Skin is warm and dry. Psychiatric: She has a normal mood and affect. Her behavior is normal. Judgment and thought content normal.  
Nursing note and vitals reviewed. MDM Number of Diagnoses or Management Options Amount and/or Complexity of Data Reviewed Clinical lab tests: ordered and reviewed Risk of Complications, Morbidity, and/or Mortality Presenting problems: moderate Diagnostic procedures: moderate Management options: moderate Patient Progress Patient progress: stable Procedures The patient was observed in the ED. Results Reviewed: 
 
 
Recent Results (from the past 24 hour(s)) INFLUENZA A & B AG (RAPID TEST) Collection Time: 12/29/18 12:47 PM  
Result Value Ref Range Influenza A Ag NEGATIVE  NEG Influenza B Ag NEGATIVE  NEG Source NASOPHARYNGEAL    
STREP AG SCREEN, GROUP A Collection Time: 12/29/18 12:47 PM  
Result Value Ref Range Group A Strep Ag ID NEGATIVE  NEG Patient appears to have a viral infection today. Her vital signs are stable, and exam is unremarkable. She was encouraged to drink plenty of fluids, rest, follow-up with her primary care physician and return to the emergency room if worsening. Use medication as directed, if OTC or prescription meds were given. I discussed the results of all labs, procedures, radiographs, and treatments with the patient and available family. Treatment plan is agreed upon and the patient is ready for discharge.   All voiced understanding of the discharge plan and medication instructions or changes as appropriate. Questions about treatment in the ED were answered. All were encouraged to return should symptoms worsen or new problems develop.

## 2018-12-31 LAB
BACTERIA SPEC CULT: NORMAL
SERVICE CMNT-IMP: NORMAL

## 2019-01-02 ENCOUNTER — HOSPITAL ENCOUNTER (EMERGENCY)
Age: 39
Discharge: LEFT BEFORE DISCHARGE | End: 2019-01-02
Attending: EMERGENCY MEDICINE
Payer: SELF-PAY

## 2019-01-02 PROCEDURE — 75810000275 HC EMERGENCY DEPT VISIT NO LEVEL OF CARE: Performed by: EMERGENCY MEDICINE

## 2019-01-03 ENCOUNTER — APPOINTMENT (OUTPATIENT)
Dept: GENERAL RADIOLOGY | Age: 39
End: 2019-01-03
Attending: EMERGENCY MEDICINE
Payer: SELF-PAY

## 2019-01-03 ENCOUNTER — HOSPITAL ENCOUNTER (EMERGENCY)
Age: 39
Discharge: HOME | End: 2019-01-03
Attending: EMERGENCY MEDICINE
Payer: SELF-PAY

## 2019-01-03 VITALS
WEIGHT: 130 LBS | DIASTOLIC BLOOD PRESSURE: 90 MMHG | TEMPERATURE: 98.8 F | HEART RATE: 95 BPM | HEIGHT: 66 IN | SYSTOLIC BLOOD PRESSURE: 128 MMHG | BODY MASS INDEX: 20.89 KG/M2 | OXYGEN SATURATION: 100 % | RESPIRATION RATE: 20 BRPM

## 2019-01-03 DIAGNOSIS — J20.9 ACUTE BRONCHITIS, UNSPECIFIED ORGANISM: Primary | ICD-10-CM

## 2019-01-03 PROCEDURE — 71046 X-RAY EXAM CHEST 2 VIEWS: CPT

## 2019-01-03 PROCEDURE — 99283 EMERGENCY DEPT VISIT LOW MDM: CPT | Performed by: EMERGENCY MEDICINE

## 2019-01-03 RX ORDER — AZITHROMYCIN 250 MG/1
TABLET, FILM COATED ORAL
Qty: 6 TAB | Refills: 0 | Status: SHIPPED | OUTPATIENT
Start: 2019-01-03 | End: 2019-01-08

## 2019-01-03 NOTE — ED NOTES
I have reviewed discharge instructions with the patient. The patient verbalized understanding. Patient left ED via Discharge Method: ambulatory to Home with self. Opportunity for questions and clarification provided. Patient given 1 scripts. To continue your aftercare when you leave the hospital, you may receive an automated call from our care team to check in on how you are doing. This is a free service and part of our promise to provide the best care and service to meet your aftercare needs.  If you have questions, or wish to unsubscribe from this service please call [phone]. Thank you for Choosing our [facility].

## 2019-01-03 NOTE — DISCHARGE INSTRUCTIONS
Please take antibiotics as indicated. You can take ibuprofen and Tylenol if you develops any fevers. Return for any worsening symptoms. Please cut back on your smoking.

## 2019-01-03 NOTE — ED NOTES
Called patient to triage. No answer in lobby, restrooms or common areas. Will attempt again after next patient.

## 2019-01-03 NOTE — ED PROVIDER NOTES
Patient is a 40-year-old female presenting with cough. She reports she is coughing up some yellowish sputum. Seen not too long ago in the ED and told she had a viral infection. She is a smoker and she is trying to stop still smoking. No reports of fever. No complaints of chest pain, shortness of breath, abdominal pain nausea or vomiting. Patient does report she feels somewhat anxious because she recently lost her job. At her last emergency department visit she was flu negative. The history is provided by the patient. No  was used. Past Medical History:  
Diagnosis Date  Arthritis Past Surgical History:  
Procedure Laterality Date  HX GYN    
   HX ORTHOPAEDIC    
 cyst behind right knee No family history on file. Social History Socioeconomic History  Marital status: SINGLE Spouse name: Not on file  Number of children: Not on file  Years of education: Not on file  Highest education level: Not on file Social Needs  Financial resource strain: Not on file  Food insecurity - worry: Not on file  Food insecurity - inability: Not on file  Transportation needs - medical: Not on file  Transportation needs - non-medical: Not on file Occupational History  Not on file Tobacco Use  Smoking status: Current Every Day Smoker Packs/day: 1.00  Smokeless tobacco: Never Used Substance and Sexual Activity  Alcohol use: No  
 Drug use: No  
 Sexual activity: Not on file Other Topics Concern  Not on file Social History Narrative  Not on file ALLERGIES: Patient has no known allergies. Review of Systems Constitutional: Negative for fatigue and fever. HENT: Negative for sore throat. Respiratory: Positive for cough and wheezing. Negative for chest tightness and shortness of breath. Cardiovascular: Negative for leg swelling. Gastrointestinal: Negative for abdominal pain, nausea and vomiting. Genitourinary: Negative for dysuria. Musculoskeletal: Negative for back pain. Neurological: Negative for syncope and headaches. Psychiatric/Behavioral: Negative for confusion. Vitals:  
 01/03/19 0255 BP: (!) 135/93 Pulse: (!) 105 Resp: 18 Temp: 98.1 °F (36.7 °C) SpO2: 97% Weight: 59 kg (130 lb) Height: 5' 6\" (1.676 m) Physical Exam  
Constitutional: She is oriented to person, place, and time. She appears well-developed and well-nourished. No distress. HENT:  
Head: Normocephalic and atraumatic. Eyes: Conjunctivae and EOM are normal. Pupils are equal, round, and reactive to light. Neck: Normal range of motion. Neck supple. Cardiovascular: Normal rate, regular rhythm and normal heart sounds. Pulmonary/Chest: Effort normal. No respiratory distress. She has wheezes. She has no rales. Minimal wheezing noted the bilateral lung bases. Abdominal: Soft. She exhibits no distension. There is no tenderness. There is no rebound. Musculoskeletal: Normal range of motion. She exhibits no edema or tenderness. Neurological: She is alert and oriented to person, place, and time. Skin: Skin is warm and dry. No rash noted. She is not diaphoretic. Psychiatric: Her behavior is normal.  
Patient appears anxious. Nursing note and vitals reviewed. MDM Number of Diagnoses or Management Options Acute bronchitis, unspecified organism: new and does not require workup Diagnosis management comments: X-ray shows no acute findings. Excellent oxygen saturations. Given report coughing up sputum and mucus will start azithromycin. Strongly encouraged her to stop smoking. She declined any prescription for prednisone. Strict return precautions discussed. The patient expressed understanding.  
 
Diya Merino MD; 1/3/2019 @7:13 AM Voice dictation software was used during the making of this note. This software is not perfect and grammatical and other typographical errors may be present. This note has not been proofread for errors. 
=================================================================== Amount and/or Complexity of Data Reviewed Tests in the radiology section of CPT®: ordered and reviewed (Xr Chest Pa Lat Result Date: 1/3/2019 Frontal and lateral views of the chest COMPARISON: December 20, 2017 INDICATION: Cough FINDINGS: There is no focal pulmonary consolidation. No pleural effusion, pneumothorax or evidence of pulmonary edema. The cardiomediastinal contour is within normal limits. The surrounding bones are intact. IMPRESSION: No pulmonary consolidation. ) Review and summarize past medical records: yes Independent visualization of images, tracings, or specimens: yes Risk of Complications, Morbidity, and/or Mortality Presenting problems: moderate Diagnostic procedures: low Management options: low Patient Progress Patient progress: stable Procedures

## 2019-01-03 NOTE — ED TRIAGE NOTES
Pt came in last Saturday and states she was told she has a upper respiratory infection. States they told her it should improve in 7 days. States it has been almost 7 days and symptoms are still there. States she is coughing up green mucus. States she is taking mucinex. Denies any fevers.

## 2019-04-10 ENCOUNTER — APPOINTMENT (OUTPATIENT)
Dept: GENERAL RADIOLOGY | Age: 39
End: 2019-04-10
Payer: SELF-PAY

## 2019-04-10 ENCOUNTER — HOSPITAL ENCOUNTER (EMERGENCY)
Age: 39
Discharge: HOME | End: 2019-04-10
Attending: EMERGENCY MEDICINE
Payer: SELF-PAY

## 2019-04-10 VITALS
HEART RATE: 88 BPM | TEMPERATURE: 99.3 F | SYSTOLIC BLOOD PRESSURE: 123 MMHG | HEIGHT: 66 IN | DIASTOLIC BLOOD PRESSURE: 83 MMHG | OXYGEN SATURATION: 99 % | BODY MASS INDEX: 20.89 KG/M2 | RESPIRATION RATE: 16 BRPM | WEIGHT: 130 LBS

## 2019-04-10 DIAGNOSIS — L40.50 PSORIATIC ARTHRITIS (HCC): ICD-10-CM

## 2019-04-10 DIAGNOSIS — M25.562 ACUTE PAIN OF LEFT KNEE: Primary | ICD-10-CM

## 2019-04-10 DIAGNOSIS — L30.9 DERMATITIS: ICD-10-CM

## 2019-04-10 PROCEDURE — 99283 EMERGENCY DEPT VISIT LOW MDM: CPT | Performed by: PHYSICIAN ASSISTANT

## 2019-04-10 RX ORDER — TRAMADOL HYDROCHLORIDE 50 MG/1
50 TABLET ORAL
Qty: 18 TAB | Refills: 0 | Status: SHIPPED | OUTPATIENT
Start: 2019-04-10 | End: 2019-04-13

## 2019-04-10 RX ORDER — TRAMADOL HYDROCHLORIDE 50 MG/1
50 TABLET ORAL
Qty: 18 TAB | Refills: 0 | Status: SHIPPED | OUTPATIENT
Start: 2019-04-10 | End: 2019-04-10

## 2019-04-10 RX ORDER — DICLOFENAC SODIUM 75 MG/1
75 TABLET, DELAYED RELEASE ORAL 2 TIMES DAILY
Qty: 30 TAB | Refills: 0 | OUTPATIENT
Start: 2019-04-10 | End: 2019-12-29

## 2019-04-10 RX ORDER — TRIAMCINOLONE ACETONIDE 1 MG/G
OINTMENT TOPICAL 3 TIMES DAILY
Qty: 453.6 G | Refills: 0 | OUTPATIENT
Start: 2019-04-10 | End: 2019-12-29

## 2019-04-10 NOTE — ED NOTES
I have reviewed discharge instructions with the patient. The patient verbalized understanding. Patient left ED via Discharge Method: ambulatory to Home with (insert name of family/friend, self, transportself). Opportunity for questions and clarification provided. Patient given 3 scripts. To continue your aftercare when you leave the hospital, you may receive an automated call from our care team to check in on how you are doing. This is a free service and part of our promise to provide the best care and service to meet your aftercare needs.  If you have questions, or wish to unsubscribe from this service please call [phone]. Thank you for Choosing our [facility].

## 2019-04-10 NOTE — ED TRIAGE NOTES
Patient reports history of arthritis. Left knee swelling last night, states improvement this morning. 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

## 2019-04-10 NOTE — DISCHARGE INSTRUCTIONS
Follow up with primary care and Ortho for recheck. Use the kenalog cream on the rash. Use the tramadol for severe pain only. Do not take Ibuprofen/motrin/aleve with the cataflam. May take tylenol with it. Rest, ice, elevate, avoid painful activities. Dermatitis: Care Instructions  Your Care Instructions  Dermatitis is the general name used for any rash or inflammation of the skin. Different kinds of dermatitis cause different kinds of rashes. Common causes of a rash include new medicines, plants (such as poison oak or poison ivy), heat, and stress. Certain illnesses can also cause a rash. An allergic reaction to something that touches your skin, such as latex, nickel, or poison ivy, is called contact dermatitis. Contact dermatitis may also be caused by something that irritates the skin, such as bleach, a chemical, or soap. These types of rashes cannot be spread from person to person. How long your rash will last depends on what caused it. Rashes may last a few days or months. Follow-up care is a key part of your treatment and safety. Be sure to make and go to all appointments, and call your doctor if you are having problems. It's also a good idea to know your test results and keep a list of the medicines you take. How can you care for yourself at home? · Do not scratch the rash. Cut your nails short, and file them smooth. Or wear gloves if this helps keep you from scratching. · Wash the area with water only. Pat dry. · Put cold, wet cloths on the rash to reduce itching. · Keep cool, and stay out of the sun. · Leave the rash open to the air as much as possible. · If the rash itches, use hydrocortisone cream. Follow the directions on the label. Calamine lotion may help for plant rashes. · Take an over-the-counter antihistamine, such as diphenhydramine (Benadryl) or loratadine (Claritin), to help calm the itching. Read and follow all instructions on the label.   · If your doctor prescribed a cream, use it as directed. If your doctor prescribed medicine, take it exactly as directed. When should you call for help? Call your doctor now or seek immediate medical care if:    · You have symptoms of infection, such as:  ? Increased pain, swelling, warmth, or redness. ? Red streaks leading from the area. ? Pus draining from the area. ? A fever.     · You have joint pain along with the rash.    Watch closely for changes in your health, and be sure to contact your doctor if:    · Your rash is changing or getting worse.     · You are not getting better as expected. Where can you learn more? Go to http://[employer]Star [employer] ABsalena.info/. Enter [phone] [phone] in the search box to learn more about \"Dermatitis: Care Instructions. \"  Current as of: April 17, 2018  Content Version: 11.9  © 6884-3351 [employer]. Care instructions adapted under license by [employer] (which disclaims liability or warranty for this information). If you have questions about a medical condition or this instruction, always ask your healthcare professional. [place] [address] any warranty or liability for your use of this information. Patient Education        Knee Pain or Injury: Care Instructions  Your Care Instructions    Injuries are a common cause of knee problems. Sudden (acute) injuries may be caused by a direct blow to the knee. They can also be caused by abnormal twisting, bending, or falling on the knee. Pain, bruising, or swelling may be severe, and may start within minutes of the injury. Overuse is another cause of knee pain. Other causes are climbing stairs, kneeling, and other activities that use the knee. Everyday wear and tear, especially as you get older, also can cause knee pain. Rest, along with home treatment, often relieves pain and allows your knee to heal. If you have a serious knee injury, you may need tests and treatment. Follow-up care is a key part of your treatment and safety. Be sure to make and go to all appointments, and call your doctor if you are having problems. It's also a good idea to know your test results and keep a list of the medicines you take. How can you care for yourself at home? · Be safe with medicines. Read and follow all instructions on the label. ? If the doctor gave you a prescription medicine for pain, take it as prescribed. ? If you are not taking a prescription pain medicine, ask your doctor if you can take an over-the-counter medicine. · Rest and protect your knee. Take a break from any activity that may cause pain. · Put ice or a cold pack on your knee for 10 to 20 minutes at a time. Put a thin cloth between the ice and your skin. · Prop up a sore knee on a pillow when you ice it or anytime you sit or lie down for the next 3 days. Try to keep it above the level of your heart. This will help reduce swelling. · If your knee is not swollen, you can put moist heat, a heating pad, or a warm cloth on your knee. · If your doctor recommends an elastic bandage, sleeve, or other type of support for your knee, wear it as directed. · Follow your doctor's instructions about how much weight you can put on your leg. Use a cane, crutches, or a walker as instructed. · Follow your doctor's instructions about activity during your healing process. If you can do mild exercise, slowly increase your activity. · Reach and stay at a healthy weight. Extra weight can strain the joints, especially the knees and hips, and make the pain worse. Losing even a few pounds may help. When should you call for help? Call 911 anytime you think you may need emergency care. For example, call if:    · You have symptoms of a blood clot in your lung (called a pulmonary embolism). These may include:  ? Sudden chest pain. ? Trouble breathing. ?  Coughing up blood.    Call your doctor now or seek immediate medical care if:    · You have severe or increasing pain.     · Your leg or foot turns cold or changes color.     · You cannot stand or put weight on your knee.     · Your knee looks twisted or bent out of shape.     · You cannot move your knee.     · You have signs of infection, such as:  ? Increased pain, swelling, warmth, or redness. ? Red streaks leading from the knee. ? Pus draining from a place on your knee. ? A fever.     · You have signs of a blood clot in your leg (called a deep vein thrombosis), such as:  ? Pain in your calf, back of the knee, thigh, or groin. ? Redness and swelling in your leg or groin.    Watch closely for changes in your health, and be sure to contact your doctor if:    · You have tingling, weakness, or numbness in your knee.     · You have any new symptoms, such as swelling.     · You have bruises from a knee injury that last longer than 2 weeks.     · You do not get better as expected. Where can you learn more? Go to http://dalila-salena.info/. Enter K195 in the search box to learn more about \"Knee Pain or Injury: Care Instructions. \"  Current as of: September 23, 2018  Content Version: 11.9  © 3830-7633 [employer]. Care instructions adapted under license by [employer] (which disclaims liability or warranty for this information). If you have questions about a medical condition or this instruction, always ask your healthcare professional. [place] [address] any warranty or liability for your use of this information. Patient Education        Knee: Exercises  Your Care Instructions  Here are some examples of exercises for your knee. Start each exercise slowly. Ease off the exercise if you start to have pain. Your doctor or physical therapist will tell you when you can start these exercises and which ones will work best for you. How to do the exercises  Quad sets    1. Sit with your leg straight and supported on the floor or a firm bed.  (If you feel discomfort in the front or back of your knee, place a small towel roll under your knee.)  2. Tighten the muscles on top of your thigh by pressing the back of your knee flat down to the floor. (If you feel discomfort under your kneecap, place a small towel roll under your knee.)  3. Hold for about 6 seconds, then rest for up to 10 seconds. 4. Do 8 to 12 repetitions several times a day. Straight-leg raises to the front    1. Lie on your back with your good knee bent so that your foot rests flat on the floor. Your injured leg should be straight. Make sure that your low back has a normal curve. You should be able to slip your flat hand in between the floor and the small of your back, with your palm touching the floor and your back touching the back of your hand. 2. Tighten the thigh muscles in the injured leg by pressing the back of your knee flat down to the floor. Hold your knee straight. 3. Keeping the thigh muscles tight, lift your injured leg up so that your heel is about 12 inches off the floor. Hold for about 6 seconds and then lower slowly. 4. Do 8 to 12 repetitions, 3 times a day. Straight-leg raises to the outside    1. Lie on your side, with your injured leg on top. 2. Tighten the front thigh muscles of your injured leg to keep your knee straight. 3. Keep your hip and your leg straight in line with the rest of your body, and keep your knee pointing forward. Do not drop your hip back. 4. Lift your injured leg straight up toward the ceiling, about 12 inches off the floor. Hold for about 6 seconds, then slowly lower your leg. 5. Do 8 to 12 repetitions. Straight-leg raises to the back    1. Lie on your stomach, and lift your leg straight up behind you (toward the ceiling). 2. Lift your toes about 6 inches off the floor, hold for about 6 seconds, then lower slowly. 3. Do 8 to 12 repetitions. Straight-leg raises to the inside    1. Lie on the side of your body with the injured leg.   2. You can either prop your other (good) leg up on a chair, or you can bend your good knee and put that foot in front of your injured knee. Do not drop your hip back. 3. Tighten the muscles on the front of your thigh to straighten your injured knee. 4. Keep your kneecap pointing forward, and lift your whole leg up toward the ceiling about 6 inches. Hold for about 6 seconds, then lower slowly. 5. Do 8 to 12 repetitions. Heel dig bridging    1. Lie on your back with both knees bent and your ankles bent so that only your heels are digging into the floor. Your knees should be bent about 90 degrees. 2. Then push your heels into the floor, squeeze your buttocks, and lift your hips off the floor until your shoulders, hips, and knees are all in a straight line. 3. Hold for about 6 seconds as you continue to breathe normally, and then slowly lower your hips back down to the floor and rest for up to 10 seconds. 4. Do 8 to 12 repetitions. Hamstring curls    1. Lie on your stomach with your knees straight. If your kneecap is uncomfortable, roll up a washcloth and put it under your leg just above your kneecap. 2. Lift the foot of your injured leg by bending the knee so that you bring the foot up toward your buttock. If this motion hurts, try it without bending your knee quite as far. This may help you avoid any painful motion. 3. Slowly lower your leg back to the floor. 4. Do 8 to 12 repetitions. 5. With permission from your doctor or physical therapist, you may also want to add a cuff weight to your ankle (not more than 5 pounds). With weight, you do not have to lift your leg more than 12 inches to get a hamstring workout. Shallow standing knee bends    1. Stand with your hands lightly resting on a counter or chair in front of you. Put your feet shoulder-width apart. 2. Slowly bend your knees so that you squat down like you are going to sit in a chair. Make sure your knees do not go in front of your toes. 3. Lower yourself about 6 inches.  Your heels should remain on the floor at all times. 4. Rise slowly to a standing position. Heel raises    1. Stand with your feet a few inches apart, with your hands lightly resting on a counter or chair in front of you. 2. Slowly raise your heels off the floor while keeping your knees straight. 3. Hold for about 6 seconds, then slowly lower your heels to the floor. 4. Do 8 to 12 repetitions several times during the day. Follow-up care is a key part of your treatment and safety. Be sure to make and go to all appointments, and call your doctor if you are having problems. It's also a good idea to know your test results and keep a list of the medicines you take. Where can you learn more? Go to http://dalila-salena.info/. Enter W904 in the search box to learn more about \"Knee: Exercises. \"  Current as of: September 20, 2018  Content Version: 11.9  © 9658-7701 [employer]. Care instructions adapted under license by [employer] (which disclaims liability or warranty for this information). If you have questions about a medical condition or this instruction, always ask your healthcare professional. [place] [address] any warranty or liability for your use of this information.

## 2019-04-10 NOTE — LETTER
[address] [facility] EMERGENCY DEPT [address] [facility] [place] [address] 
[phone] Work/School Note Date: 4/10/2019 To Whom It May concern: 
 
Ken Bernard was seen and treated today in the emergency room by the following provider(s): 
Attending Provider: Caron Santana MD 
Physician Assistant: AICHA Jordan.   
 
Ken Bernard may return to work on 04/13/19. Sincerely, AICHA Becerril

## 2019-04-10 NOTE — ED PROVIDER NOTES
Patient is here with left knee swelling and pain that started after working as a  yesterday. She states she started this job a week ago. She has a history of psoriatic arthritis. She has not injured it. She also has a diffuse, itchy rash on her body that has been going on for \"a while. \"  She does not have a primary care physician. She has not had chest pain, shortness of breath, abdominal pain, dizziness, weakness, swelling to the extremities other than in the left knee, trouble with urination or bowel movements, fever, nausea, vomiting or other new symptoms. She was ambulatory to the room without difficulty and well hydrated. She states this happened last year when it got humid as well. She also ate pizza, a hamburger, fries and other things she doesn't normally eat on  night, 2 days before this began. The history is provided by the patient. Knee Swelling This is a new problem. The current episode started yesterday. The problem occurs constantly. The problem has been gradually improving. The pain is present in the left knee. The pain is at a severity of 6/10. The pain is moderate. Associated symptoms include stiffness. Pertinent negatives include no numbness, full range of motion, no tingling, no itching, no back pain and no neck pain. The symptoms are aggravated by movement, palpation, activity and standing. She has tried OTC pain medications for the symptoms. The treatment provided mild relief. There has been no history of extremity trauma. Past Medical History:  
Diagnosis Date  Arthritis Past Surgical History:  
Procedure Laterality Date  HX GYN    
   HX ORTHOPAEDIC    
 cyst behind right knee No family history on file. Social History Socioeconomic History  Marital status: SINGLE Spouse name: Not on file  Number of children: Not on file  Years of education: Not on file  Highest education level: Not on file Occupational History  Not on file Social Needs  Financial resource strain: Not on file  Food insecurity:  
  Worry: Not on file Inability: Not on file  Transportation needs:  
  Medical: Not on file Non-medical: Not on file Tobacco Use  Smoking status: Current Every Day Smoker Packs/day: 1.00  Smokeless tobacco: Never Used Substance and Sexual Activity  Alcohol use: No  
 Drug use: No  
 Sexual activity: Not on file Lifestyle  Physical activity:  
  Days per week: Not on file Minutes per session: Not on file  Stress: Not on file Relationships  Social connections:  
  Talks on phone: Not on file Gets together: Not on file Attends Confucianism service: Not on file Active member of club or organization: Not on file Attends meetings of clubs or organizations: Not on file Relationship status: Not on file  Intimate partner violence:  
  Fear of current or ex partner: Not on file Emotionally abused: Not on file Physically abused: Not on file Forced sexual activity: Not on file Other Topics Concern  Not on file Social History Narrative  Not on file ALLERGIES: Patient has no known allergies. Review of Systems Constitutional: Negative. HENT: Negative. Eyes: Negative. Respiratory: Negative. Cardiovascular: Negative. Gastrointestinal: Negative. Genitourinary: Negative. Musculoskeletal: Positive for stiffness. Negative for back pain and neck pain. Left knee pain Skin: Positive for color change and rash. Negative for itching. Neurological: Negative. Negative for tingling and numbness. Psychiatric/Behavioral: Negative. All other systems reviewed and are negative. Vitals:  
 04/10/19 0930 BP: 123/83 Pulse: 88 Resp: 16 Temp: 99.3 °F (37.4 °C) SpO2: 97% Weight: 59 kg (130 lb) Height: 5' 6\" (1.676 m) Physical Exam  
 Constitutional: She is oriented to person, place, and time. She appears well-developed and well-nourished. HENT:  
Head: Normocephalic and atraumatic. Right Ear: External ear normal.  
Left Ear: External ear normal.  
Nose: Nose normal.  
Mouth/Throat: Oropharynx is clear and moist.  
Eyes: Pupils are equal, round, and reactive to light. Conjunctivae and EOM are normal.  
Neck: Normal range of motion. Neck supple. Cardiovascular: Normal rate, regular rhythm, normal heart sounds and intact distal pulses. Pulmonary/Chest: Effort normal and breath sounds normal.  
Abdominal: Soft. Bowel sounds are normal.  
Musculoskeletal: Normal range of motion. Legs: 
Neurological: She is alert and oriented to person, place, and time. She has normal reflexes. Skin: Skin is warm and dry. Rash noted. Rash is urticarial. Rash is not vesicular. Psychiatric: She has a normal mood and affect. Her behavior is normal. Judgment and thought content normal.  
Nursing note and vitals reviewed. MDM Number of Diagnoses or Management Options Acute pain of left knee:  
Dermatitis:  
Psoriatic arthritis ([facility] [address]): Amount and/or Complexity of Data Reviewed Tests in the radiology section of CPT®: (XR of knee ordered, patient refused.) Risk of Complications, Morbidity, and/or Mortality Presenting problems: moderate Diagnostic procedures: moderate Management options: moderate Patient Progress Patient progress: stable Procedures The patient was observed in the ED. Patient appears to be having a flare of her psoriatic arthritis. She was instructed to rest, ice, elevate and avoid painful activities. We have given her an Ace wrap to apply to the knee. I have written for Kenalog cream for the other rash. She declined prednisone by mouth which I think would have helped the rash as well as the psoriatic arthritis.   She does not have health insurance so I did have Zak Schwab, case management talk with her about primary care follow-up. We have also discussed diet and its relationship to the immune system. I have written her a few tramadol for the pain if needed and a note for work. Return to the ED if worsening in any way. There was no indication for drainage of that joint today as it was mild swelling and there was a rash laterally. She was referred to ortho for follow-up as well. I discussed the results of all labs, procedures, radiographs, and treatments with the patient and available family. Treatment plan is agreed upon and the patient is ready for discharge. All voiced understanding of the discharge plan and medication instructions or changes as appropriate. Questions about treatment in the ED were answered. All were encouraged to return should symptoms worsen or new problems develop.

## 2019-07-23 ENCOUNTER — HOSPITAL ENCOUNTER (EMERGENCY)
Age: 39
Discharge: HOME | End: 2019-07-23
Attending: EMERGENCY MEDICINE
Payer: SELF-PAY

## 2019-07-23 VITALS
BODY MASS INDEX: 20.09 KG/M2 | SYSTOLIC BLOOD PRESSURE: 108 MMHG | DIASTOLIC BLOOD PRESSURE: 71 MMHG | HEART RATE: 78 BPM | RESPIRATION RATE: 16 BRPM | TEMPERATURE: 98.4 F | OXYGEN SATURATION: 99 % | WEIGHT: 125 LBS | HEIGHT: 66 IN

## 2019-07-23 DIAGNOSIS — L73.9 FOLLICULITIS: ICD-10-CM

## 2019-07-23 DIAGNOSIS — L40.9 PSORIASIS: Primary | ICD-10-CM

## 2019-07-23 PROCEDURE — 99282 EMERGENCY DEPT VISIT SF MDM: CPT | Performed by: EMERGENCY MEDICINE

## 2019-07-23 RX ORDER — SULFAMETHOXAZOLE AND TRIMETHOPRIM 800; 160 MG/1; MG/1
1 TABLET ORAL 2 TIMES DAILY
Qty: 14 TAB | Refills: 0 | Status: SHIPPED | OUTPATIENT
Start: 2019-07-23 | End: 2019-07-30

## 2019-07-23 NOTE — ED TRIAGE NOTES
PT presents ambulatory to triage in no apparent distress. Pt complains of generalized body aches and rash to back of legs and on upper part of buttock. Sx have been going on for 2-3 months now. [facility] and was told that they couldn't help her \"because you have too much going on\" Pt has hx of psoriasis. Pt denies any changes in beauty products, living conditions. Pt also complains of irregular periods and hot flashes.

## 2019-07-24 NOTE — ED PROVIDER NOTES
HPI:  37 F, [place] concern of worsening and psoriasis. Also has area of drainage in the left groin that has been there for approximately one month. Currently does not have a primarycare physician. [facility] and was told she needs to come back because she presented with 2 many medical problems. She's also have an one month of multiple episodes of loose stool first thing in the morning. She denies any abdominal pain. No fever. Complaining of feeling generalized fatigue. Getting enough sleep. Works full time. No urinary pain. Not sexually active. Has also noticed changes in her menstrual cycle, hair loss,hot flashes liked symptoms    ROS  Constitutional: No fever, no chills  Skin: +rash  Eye: No vision changes  ENMT: No sore throat  Respiratory: No shortness of breath, no cough  Cardiovascular: No chest pain, no palpitations  Gastrointestinal: No vomiting, no nausea, no diarrhea, no abdominal pain  : No dysuria  MSK: No back pain, no muscle pain, + joint pain - RT knee with Psoriatic arthritis  Neuro: No headache, no change in mental status, no numbness, no tingling, no weakness  Psych:   Endocrine:   All other review of systems positive per history of present illness and the above otherwise negative or noncontributory. Visit Vitals  /71   Pulse 78   Temp 98.4 °F (36.9 °C)   Resp 16   Ht 5' 6\" (1.676 m)   Wt 56.7 kg (125 lb)   SpO2 99%   BMI 20.18 kg/m²     Past Medical History:   Diagnosis Date    Arthritis      Past Surgical History:   Procedure Laterality Date    HX GYN          HX ORTHOPAEDIC      cyst behind right knee     Prior to Admission Medications   Prescriptions Last Dose Informant Patient Reported? Taking? Brompheniramine-Pseudoeph-DM (BROMFED DM) 2-30-10 mg/5 mL syrup   No No   Sig: Take 5-10 mL by mouth four (4) times daily as needed for Cough. amoxicillin 500 mg tab   No No   Sig: Take 500 mg by mouth three (3) times daily.    diclofenac EC (VOLTAREN) 75 mg EC tablet   No No   Sig: Take 1 Tab by mouth two (2) times a day. With food, do not take ibuprofen/motrin or aleve with this medication. You may take tylenol with it.   triamcinolone acetonide (KENALOG) 0.1 % ointment   No No   Sig: Apply  to affected area three (3) times daily. use thin layer to rash area      Facility-Administered Medications: None         Adult Exam   General: alert, no acute distress  Head: normocephalic, atraumatic  ENT: moist mucous membranes  Neck: supple, non-tender; full range of motion  Cardiovascular: regular rate and rhythm, normal peripheral perfusion, no edema  Respiratory:  normal respirations; no wheezing, rales or rhonchi  Gastrointestinal: soft, non-tender; no rebound or guarding, no peritoneal signs, no distension  Back: non-tender, full range of motion  Musculoskeletal: normal range of motion, normal strength, no gross deformities  Very patches in the right elbow, bilateral knee that is raised, plaque-like, manjit and without any blistering that appear consistent with psoriatic changes  Left groin also psoriatic changes along the pubic hairline. Left inguinal crease with a small area of drainage with induration without erythema. Clear fluid noted  Neurological: alert and oriented x 4, no gross focal deficits; normal speech  Psychiatric: cooperative; appropriate mood and affect    MDM: Likely psoriatic skin changes. Patient stated she does not want to be on oral steroids since she does not react well to them. Has been using triamcinolone cream without improvement. Low suspicion for bed bugs. This appeared consistent with psoriasis. The left groin appeared consistent with folliculitis. Recommend Bactrim. I've given her some information to rheumatology. Abdomen is soft. Do not suspect intra-abdominal surgical pathology. This could be related to her psoriasis with possible IBS. Recommend to try gluten free diet. No recent antibiotics, recent travel. No bloody stool. Low suspicion for infectious diarrhea      Dragon voice recognition software was used to create this note. Although the note has been reviewed and corrected where necessary, additional errors may have been overlooked and remain in the text.

## 2019-07-24 NOTE — DISCHARGE INSTRUCTIONS
Complete course antibiotic. Apply Neosporin to the wound 3-4 times per day. Follow up with rheumatologist.  Consider changing your diet to gluten-free diet. Take Probiotics and drink plenty of fluids while on antibiotic. Follow-up with [facility].

## 2019-12-06 ENCOUNTER — HOSPITAL ENCOUNTER (EMERGENCY)
Age: 39
Discharge: HOME | End: 2019-12-06
Attending: STUDENT IN AN ORGANIZED HEALTH CARE EDUCATION/TRAINING PROGRAM
Payer: SELF-PAY

## 2019-12-06 ENCOUNTER — APPOINTMENT (OUTPATIENT)
Dept: GENERAL RADIOLOGY | Age: 39
End: 2019-12-06
Attending: STUDENT IN AN ORGANIZED HEALTH CARE EDUCATION/TRAINING PROGRAM
Payer: SELF-PAY

## 2019-12-06 VITALS
DIASTOLIC BLOOD PRESSURE: 89 MMHG | RESPIRATION RATE: 24 BRPM | TEMPERATURE: 98.1 F | SYSTOLIC BLOOD PRESSURE: 144 MMHG | OXYGEN SATURATION: 100 % | HEART RATE: 97 BPM | WEIGHT: 130 LBS | HEIGHT: 66 IN | BODY MASS INDEX: 20.89 KG/M2

## 2019-12-06 DIAGNOSIS — L40.9 PSORIASIS: Primary | ICD-10-CM

## 2019-12-06 DIAGNOSIS — M25.512 PAIN IN JOINT OF LEFT SHOULDER: ICD-10-CM

## 2019-12-06 LAB
ALBUMIN SERPL-MCNC: 3.6 G/DL (ref 3.5–5)
ALBUMIN/GLOB SERPL: 0.6 {RATIO} (ref 1.2–3.5)
ALP SERPL-CCNC: 108 U/L (ref 50–136)
ALT SERPL-CCNC: 18 U/L (ref 12–65)
ANION GAP SERPL CALC-SCNC: 5 MMOL/L (ref 7–16)
AST SERPL-CCNC: 17 U/L (ref 15–37)
ATRIAL RATE: 98 BPM
BASOPHILS # BLD: 0.1 K/UL (ref 0–0.2)
BASOPHILS NFR BLD: 1 % (ref 0–2)
BILIRUB SERPL-MCNC: 0.4 MG/DL (ref 0.2–1.1)
BUN SERPL-MCNC: 6 MG/DL (ref 6–23)
CALCIUM SERPL-MCNC: 9.4 MG/DL (ref 8.3–10.4)
CALCULATED P AXIS, ECG09: 81 DEGREES
CALCULATED R AXIS, ECG10: 76 DEGREES
CALCULATED T AXIS, ECG11: 74 DEGREES
CHLORIDE SERPL-SCNC: 107 MMOL/L (ref 98–107)
CO2 SERPL-SCNC: 28 MMOL/L (ref 21–32)
CREAT SERPL-MCNC: 0.72 MG/DL (ref 0.6–1)
DIAGNOSIS, 93000: NORMAL
DIFFERENTIAL METHOD BLD: ABNORMAL
EOSINOPHIL # BLD: 0.2 K/UL (ref 0–0.8)
EOSINOPHIL NFR BLD: 1 % (ref 0.5–7.8)
ERYTHROCYTE [DISTWIDTH] IN BLOOD BY AUTOMATED COUNT: 13.3 % (ref 11.9–14.6)
GLOBULIN SER CALC-MCNC: 6.2 G/DL (ref 2.3–3.5)
GLUCOSE SERPL-MCNC: 75 MG/DL (ref 65–100)
HCT VFR BLD AUTO: 46.2 % (ref 35.8–46.3)
HGB BLD-MCNC: 15 G/DL (ref 11.7–15.4)
IMM GRANULOCYTES # BLD AUTO: 0.1 K/UL (ref 0–0.5)
IMM GRANULOCYTES NFR BLD AUTO: 1 % (ref 0–5)
LYMPHOCYTES # BLD: 1 K/UL (ref 0.5–4.6)
LYMPHOCYTES NFR BLD: 9 % (ref 13–44)
MCH RBC QN AUTO: 29.1 PG (ref 26.1–32.9)
MCHC RBC AUTO-ENTMCNC: 32.5 G/DL (ref 31.4–35)
MCV RBC AUTO: 89.7 FL (ref 79.6–97.8)
MONOCYTES # BLD: 0.9 K/UL (ref 0.1–1.3)
MONOCYTES NFR BLD: 8 % (ref 4–12)
NEUTS SEG # BLD: 9.6 K/UL (ref 1.7–8.2)
NEUTS SEG NFR BLD: 81 % (ref 43–78)
NRBC # BLD: 0 K/UL (ref 0–0.2)
P-R INTERVAL, ECG05: 136 MS
PLATELET # BLD AUTO: 388 K/UL (ref 150–450)
PMV BLD AUTO: 10 FL (ref 9.4–12.3)
POTASSIUM SERPL-SCNC: 3.9 MMOL/L (ref 3.5–5.1)
PROT SERPL-MCNC: 9.8 G/DL (ref 6.3–8.2)
Q-T INTERVAL, ECG07: 316 MS
QRS DURATION, ECG06: 70 MS
QTC CALCULATION (BEZET), ECG08: 403 MS
RBC # BLD AUTO: 5.15 M/UL (ref 4.05–5.2)
SODIUM SERPL-SCNC: 140 MMOL/L (ref 136–145)
TROPONIN I SERPL-MCNC: <0.02 NG/ML (ref 0.02–0.05)
VENTRICULAR RATE, ECG03: 98 BPM
WBC # BLD AUTO: 11.9 K/UL (ref 4.3–11.1)

## 2019-12-06 PROCEDURE — 84484 ASSAY OF TROPONIN QUANT: CPT

## 2019-12-06 PROCEDURE — 80053 COMPREHEN METABOLIC PANEL: CPT

## 2019-12-06 PROCEDURE — 71046 X-RAY EXAM CHEST 2 VIEWS: CPT

## 2019-12-06 PROCEDURE — 73030 X-RAY EXAM OF SHOULDER: CPT

## 2019-12-06 PROCEDURE — 99284 EMERGENCY DEPT VISIT MOD MDM: CPT | Performed by: STUDENT IN AN ORGANIZED HEALTH CARE EDUCATION/TRAINING PROGRAM

## 2019-12-06 PROCEDURE — 93005 ELECTROCARDIOGRAM TRACING: CPT | Performed by: STUDENT IN AN ORGANIZED HEALTH CARE EDUCATION/TRAINING PROGRAM

## 2019-12-06 PROCEDURE — 85025 COMPLETE CBC W/AUTO DIFF WBC: CPT

## 2019-12-06 RX ORDER — IBUPROFEN 800 MG/1
800 TABLET ORAL
Qty: 20 TAB | Refills: 0 | Status: SHIPPED | OUTPATIENT
Start: 2019-12-06 | End: 2019-12-13

## 2019-12-06 RX ORDER — HYDROXYZINE PAMOATE 25 MG/1
25 CAPSULE ORAL
Qty: 30 CAP | Refills: 1 | Status: SHIPPED | OUTPATIENT
Start: 2019-12-06 | End: 2019-12-06

## 2019-12-06 RX ORDER — HYDROXYZINE PAMOATE 25 MG/1
25 CAPSULE ORAL
Qty: 30 CAP | Refills: 1 | OUTPATIENT
Start: 2019-12-06 | End: 2019-12-29

## 2019-12-06 NOTE — ED NOTES
I have reviewed discharge instructions with the patient. The patient verbalized understanding. Patient left ED via Discharge Method: ambulatory to Home with self  Opportunity for questions and clarification provided. Patient given 2 scripts. To continue your aftercare when you leave the hospital, you may receive an automated call from our care team to check in on how you are doing. This is a free service and part of our promise to provide the best care and service to meet your aftercare needs.  If you have questions, or wish to unsubscribe from this service please call [phone]. Thank you for Choosing our [facility] Emergency Department.

## 2019-12-06 NOTE — ED PROVIDER NOTES
42-year-old female patient presents with multiple medical complaints including left arm pain, ongoing skin rash. Patient states her arm pain is been present intermittently for 2 weeks. She states she is concerned as to the cause of this arm pain. Unclear as to why patient waited 2 weeks to present to the emergency department however. She describes arm pain as an aching discomfort over the left shoulder that comes and goes. She states when pain is present, it is worsened by movement and palpation. She reports a history of psoriatic arthritis but is unsure if this is the cause of her current symptoms. She denies injury or trauma to the area. She denies changes in activity that she attributes to this pain. She denies any chest pain, pressure or tightness. There is no shortness of breath, nausea or vomiting, diaphoresis. She denies similar symptoms in the past.  Patient reports a history of psoriatic arthritis. She reports ongoing skin irritation that is been diagnosed with dermatitis in the past.  She states that these skin lesions have been present for a year. She is reluctant to take steroids as she states last time she was prescribed topical steroids, she had her menstrual cycle twice in a month. Patient denies any fever or chills associated with the skin lesions. She denies close contacts with similar symptoms. Improved with home remedies such as topical lotions or coconut oil. The history is provided by the patient. No  was used. Arm Pain    This is a new problem. The current episode started more than 1 week ago. The problem occurs daily. The problem has not changed since onset. The pain is present in the left arm. The quality of the pain is described as aching. The pain is moderate. Pertinent negatives include no numbness, no back pain and no neck pain. She has tried nothing for the symptoms. There has been no history of extremity trauma.  Psoriatic arthritis        Past Medical History:   Diagnosis Date    Arthritis        Past Surgical History:   Procedure Laterality Date    HX GYN          HX ORTHOPAEDIC      cyst behind right knee         No family history on file. Social History     Socioeconomic History    Marital status: SINGLE     Spouse name: Not on file    Number of children: Not on file    Years of education: Not on file    Highest education level: Not on file   Occupational History    Not on file   Social Needs    Financial resource strain: Not on file    Food insecurity:     Worry: Not on file     Inability: Not on file    Transportation needs:     Medical: Not on file     Non-medical: Not on file   Tobacco Use    Smoking status: Current Every Day Smoker     Packs/day: 1.00    Smokeless tobacco: Never Used   Substance and Sexual Activity    Alcohol use: No    Drug use: No    Sexual activity: Not on file   Lifestyle    Physical activity:     Days per week: Not on file     Minutes per session: Not on file    Stress: Not on file   Relationships    Social connections:     Talks on phone: Not on file     Gets together: Not on file     Attends Protestant service: Not on file     Active member of club or organization: Not on file     Attends meetings of clubs or organizations: Not on file     Relationship status: Not on file    Intimate partner violence:     Fear of current or ex partner: Not on file     Emotionally abused: Not on file     Physically abused: Not on file     Forced sexual activity: Not on file   Other Topics Concern    Not on file   Social History Narrative    Not on file         ALLERGIES: Patient has no known allergies. Review of Systems   Constitutional: Negative for chills, diaphoresis and fever. HENT: Negative for congestion, sneezing and sore throat. Eyes: Negative for visual disturbance. Respiratory: Negative for cough, chest tightness, shortness of breath and wheezing.     Cardiovascular: Negative for chest pain and leg swelling. Gastrointestinal: Negative for abdominal pain, blood in stool, diarrhea, nausea and vomiting. Endocrine: Negative for polyuria. Genitourinary: Negative for difficulty urinating, dysuria, flank pain, hematuria and urgency. Musculoskeletal: Positive for arthralgias and myalgias. Negative for back pain, neck pain and neck stiffness. Skin: Positive for rash. Negative for color change. Neurological: Negative for dizziness, syncope, speech difficulty, weakness, light-headedness, numbness and headaches. Psychiatric/Behavioral: Negative for behavioral problems. All other systems reviewed and are negative. Vitals:    12/06/19 0651   BP: 131/86   Pulse: (!) 114   Resp: 20   Temp: 98.1 °F (36.7 °C)   SpO2: 97%   Weight: 59 kg (130 lb)   Height: 5' 6\" (1.676 m)            Physical Exam  Vitals signs and nursing note reviewed. Constitutional:       General: She is not in acute distress. Appearance: She is well-developed. She is not diaphoretic. Comments: Alert and oriented to person place and time. No acute distress, speaks in clear, fluid sentences. HENT:      Head: Normocephalic and atraumatic. Right Ear: External ear normal.      Left Ear: External ear normal.      Nose: Nose normal.   Eyes:      Pupils: Pupils are equal, round, and reactive to light. Neck:      Musculoskeletal: Normal range of motion. Cardiovascular:      Rate and Rhythm: Normal rate and regular rhythm. Heart sounds: Normal heart sounds. No murmur. No friction rub. No gallop. Pulmonary:      Effort: Pulmonary effort is normal. No respiratory distress. Breath sounds: Normal breath sounds. No stridor. No decreased breath sounds, wheezing, rhonchi or rales. Chest:      Chest wall: No tenderness. Abdominal:      General: There is no distension. Palpations: Abdomen is soft. There is no mass. Tenderness: There is no tenderness. There is no guarding or rebound.       Hernia: No hernia is present. Musculoskeletal: Normal range of motion. General: No tenderness or deformity. Comments: There is pain with range of motion testing with palpable crepitus in the left shoulder. No deformity noted. Distal pulses are intact with brisk capillary refill present. No significant swelling or erythema noted. Skin:     General: Skin is warm and dry. Findings: Rash present. Rash is crusting, macular and papular. Comments: Skin lesions noted to the left lower leg and distal medial aspect of the patient's right thigh. There is also a plaque noted to the extensor surface of the patient's right elbow. Areas are consistent with psoriatic arthritis in my opinion. No significant cellulitis, drainage or discharge. She has some subtle, very small lesions over the index finger on the right hand. These are isolated to this finger and do not involve the webspaces. Palms are clear. inconsistent with scabies rash. Neurological:      Mental Status: She is alert and oriented to person, place, and time. Cranial Nerves: No cranial nerve deficit. Psychiatric:         Mood and Affect: Mood is anxious. Affect is tearful. Comments: Initially patient is resting comfortably however quickly becomes tearful and anxious with descriptions of her current symptoms and recent stressors at home and work. MDM  Number of Diagnoses or Management Options  Diagnosis management comments: Patient symptoms are inconsistent with cardiac cause of chest pain. She is very anxious that this may be heart related. We will collect basic blood work and EKG to reassure patient. X-ray imaging is been ordered to evaluate the shoulder to further assess because of pain.     Recommended topical steroid therapy for patient's itching rash however she was reluctant to do so secondary to frequent menstrual cycles during previous use of topical steroid cream.       Amount and/or Complexity of Data Reviewed  Clinical lab tests: ordered and reviewed  Tests in the radiology section of CPT®: ordered and reviewed  Tests in the medicine section of CPT®: ordered and reviewed    Risk of Complications, Morbidity, and/or Mortality  Presenting problems: moderate  Diagnostic procedures: low  Management options: moderate    Patient Progress  Patient progress: stable         Procedures

## 2019-12-06 NOTE — ED TRIAGE NOTES
Pt arrives from home via POV with c/o left arm pain x 2 weeks. Pt denies trauma or known injury to the area. Denies numbness or tingling below area, denies CP, no loss in  strength. Pt describes limited mobility.

## 2019-12-06 NOTE — DISCHARGE INSTRUCTIONS
Patient Education      Vistaril has been prescribed to help with itching. Can also be used for anxiety or anxious type feelings. Use medication as directed. Motrin is been prescribed for discomfort in her shoulder. If your pain worsens, you develop chest discomfort or shortness of breath, return to this department. Otherwise arrange follow-up care with 1 of the primary care providers listed. In addition referral to [facility] is been provided as well for help with stress and anxiety. Shoulder Pain: Care Instructions  Your Care Instructions    You can hurt your shoulder by using it too much during an activity, such as fishing or baseball. It can also happen as part of the everyday wear and tear of getting older. Shoulder injuries can be slow to heal, but your shoulder should get better with time. Your doctor may recommend a sling to rest your shoulder. If you have injured your shoulder, you may need testing and treatment. Follow-up care is a key part of your treatment and safety. Be sure to make and go to all appointments, and call your doctor if you are having problems. It's also a good idea to know your test results and keep a list of the medicines you take. How can you care for yourself at home? · Take pain medicines exactly as directed. ? If the doctor gave you a prescription medicine for pain, take it as prescribed. ? If you are not taking a prescription pain medicine, ask your doctor if you can take an over-the-counter medicine. ? Do not take two or more pain medicines at the same time unless the doctor told you to. Many pain medicines contain acetaminophen, which is Tylenol. Too much acetaminophen (Tylenol) can be harmful. · If your doctor recommends that you wear a sling, use it as directed. Do not take it off before your doctor tells you to. · Put ice or a cold pack on the sore area for 10 to 20 minutes at a time. Put a thin cloth between the ice and your skin.   · If there is no swelling, you can put moist heat, a heating pad, or a warm cloth on your shoulder. Some doctors suggest alternating between hot and cold. · Rest your shoulder for a few days. If your doctor recommends it, you can then begin gentle exercise of the shoulder, but do not lift anything heavy. When should you call for help? Call 911 anytime you think you may need emergency care. For example, call if:    · You have chest pain or pressure. This may occur with:  ? Sweating. ? Shortness of breath. ? Nausea or vomiting. ? Pain that spreads from the chest to the neck, jaw, or one or both shoulders or arms. ? Dizziness or lightheadedness. ? A fast or uneven pulse. After calling 911, chew 1 adult-strength aspirin. Wait for an ambulance. Do not try to drive yourself.     · Your arm or hand is cool or pale or changes color.    Call your doctor now or seek immediate medical care if:    · You have signs of infection, such as:  ? Increased pain, swelling, warmth, or redness in your shoulder. ? Red streaks leading from a place on your shoulder. ? Pus draining from an area of your shoulder. ? Swollen lymph nodes in your neck, armpits, or groin. ? A fever.    Watch closely for changes in your health, and be sure to contact your doctor if:    · You cannot use your shoulder.     · Your shoulder does not get better as expected. Where can you learn more? Go to http://dalila-salena.info/. Enter A181 in the search box to learn more about \"Shoulder Pain: Care Instructions. \"  Current as of: June 26, 2019  Content Version: 12.2  © 6537-3466 [employer]. Care instructions adapted under license by [employer] (which disclaims liability or warranty for this information). If you have questions about a medical condition or this instruction, always ask your healthcare professional. [address] any warranty or liability for your use of this information.          Patient Education        Psoriasis: Care Instructions  Your Care Instructions  Psoriasis (say \"suo-TS-iw-lew\") is a long-term skin problem that causes thick, white, silvery, or red patches on the skin. The patches may be small or large, and they occur most often on the knees, elbows, scalp, hands, feet, or lower back. The skin may be scaly. If the condition is severe, your skin can become itchy and tender. Psoriasis also can be embarrassing if the patches are on visible areas. You can treat psoriasis with good care at home and with medicine from your doctor. You may put medicine on your skin and take pills or have shots to stop the redness and swelling. Your doctor also may suggest ultraviolet light treatments. Follow-up care is a key part of your treatment and safety. Be sure to make and go to all appointments, and call your doctor if you are having problems. It's also a good idea to know your test results and keep a list of the medicines you take. How can you care for yourself at home? · If your doctor prescribes medicine, use it exactly as prescribed. Follow your doctor's advice for sunlight or ultraviolet light treatment. Call your doctor if you think you are having a problem with your medicine. · Protect your skin:  ? Keep your skin moist. After bathing, put an ointment, cream, or lotion on your skin while it is still damp. This seals in moisture. Use over-the-counter products that your doctor suggests. These may include Cetaphil, Lubriderm, or Eucerin. Petroleum jelly (such as Vaseline) and vegetable shortening (such as Crisco) also work. ? If you have psoriasis on your scalp, use a shampoo with salicylic acid, such as Neutrogena T/Sal.  ? Avoid harsh skin products, such as those that contain alcohol. ? Cover your skin in cold weather. ? Try to prevent sunburn. Although short periods of sun exposure reduce psoriasis in most people, too much sun can damage the skin and cause skin cancer.  In addition, sunburns can trigger psoriasis. Use sunscreen on areas of your skin that do not have psoriasis. Make sure to use a broad-spectrum sunscreen that has a sun protection factor (SPF) of 30 or higher. Use it every day, even when it is cloudy. ? Take care to avoid accidents such as cutting or scraping your skin. An injury to the skin can cause psoriasis patches to form anywhere on the body, including the area of the injury. ? Avoid tight shoes, clothing, watchbands, and hats. These may irritate your skin. ? Use a vaporizer or humidifier to add moisture to your bedroom. Follow the directions for cleaning the machine. · Try making one or more changes to your daily habits to help with managing your psoriasis. For example:  ? Try to control stress and anxiety. They may cause psoriasis to appear suddenly or can make symptoms worse. ? If you smoke, think about quitting. If you need help quitting, talk to your doctor about stop-smoking programs and medicines. ? If you drink, limit or reduce the amount of alcohol you drink. ? If you are overweight, see if you can lose some weight. · Seek support from family and friends. Talk to a counselor or other professional if you feel sad about your condition and need more help. When should you call for help? Call your doctor now or seek immediate medical care if:    · You have signs of infection, such as:  ? Increased pain, swelling, warmth, or redness. ? Red streaks leading from the area. ? Pus draining from the area. ? A fever.    Watch closely for changes in your health, and be sure to contact your doctor if:    · You have swelling, stiffness, or pain in your joints.     · You do not get better as expected. Where can you learn more? Go to http://dalila-salena.info/. Enter K389 in the search box to learn more about \"Psoriasis: Care Instructions. \"  Current as of: April 1, 2019  Content Version: 12.2  © 9206-7465 [employer].  Care instructions adapted under license by [employer] (which disclaims liability or warranty for this information). If you have questions about a medical condition or this instruction, always ask your healthcare professional. [address] any warranty or liability for your use of this information.

## 2019-12-06 NOTE — LETTER
[address] [facility] EMERGENCY DEPT 
[address] [facility] 
[phone] Work/School Note Date: 12/6/2019 To Whom It May concern: 
 
Bayron Roper was seen and treated today in the emergency room by the following provider(s): 
Attending Provider: Enid Webb may return to work on 12/9/19. Sincerely, Kenney Oreilly DO

## 2019-12-29 ENCOUNTER — HOSPITAL ENCOUNTER (EMERGENCY)
Age: 39
Discharge: HOME | End: 2019-12-29
Attending: EMERGENCY MEDICINE
Payer: SELF-PAY

## 2019-12-29 ENCOUNTER — APPOINTMENT (OUTPATIENT)
Dept: ULTRASOUND IMAGING | Age: 39
End: 2019-12-29
Attending: EMERGENCY MEDICINE
Payer: SELF-PAY

## 2019-12-29 VITALS
TEMPERATURE: 101 F | RESPIRATION RATE: 16 BRPM | HEART RATE: 95 BPM | HEIGHT: 66 IN | WEIGHT: 130 LBS | DIASTOLIC BLOOD PRESSURE: 74 MMHG | OXYGEN SATURATION: 94 % | SYSTOLIC BLOOD PRESSURE: 124 MMHG | BODY MASS INDEX: 20.89 KG/M2

## 2019-12-29 DIAGNOSIS — N90.4 LICHEN SCLEROSUS OF FEMALE GENITALIA: ICD-10-CM

## 2019-12-29 DIAGNOSIS — R35.0 URINARY FREQUENCY: ICD-10-CM

## 2019-12-29 DIAGNOSIS — R10.32 BILATERAL LOWER ABDOMINAL DISCOMFORT: Primary | ICD-10-CM

## 2019-12-29 DIAGNOSIS — R10.31 BILATERAL LOWER ABDOMINAL DISCOMFORT: Primary | ICD-10-CM

## 2019-12-29 LAB
ALBUMIN SERPL-MCNC: 3.4 G/DL (ref 3.5–5)
ALBUMIN/GLOB SERPL: 0.6 {RATIO} (ref 1.2–3.5)
ALP SERPL-CCNC: 98 U/L (ref 50–136)
ALT SERPL-CCNC: 16 U/L (ref 12–65)
ANION GAP SERPL CALC-SCNC: 7 MMOL/L (ref 7–16)
AST SERPL-CCNC: 12 U/L (ref 15–37)
BASOPHILS # BLD: 0.1 K/UL (ref 0–0.2)
BASOPHILS NFR BLD: 1 % (ref 0–2)
BILIRUB SERPL-MCNC: 0.4 MG/DL (ref 0.2–1.1)
BUN SERPL-MCNC: 6 MG/DL (ref 6–23)
CALCIUM SERPL-MCNC: 9.2 MG/DL (ref 8.3–10.4)
CHLORIDE SERPL-SCNC: 105 MMOL/L (ref 98–107)
CO2 SERPL-SCNC: 27 MMOL/L (ref 21–32)
CREAT SERPL-MCNC: 0.63 MG/DL (ref 0.6–1)
DIFFERENTIAL METHOD BLD: NORMAL
EOSINOPHIL # BLD: 0.2 K/UL (ref 0–0.8)
EOSINOPHIL NFR BLD: 2 % (ref 0.5–7.8)
ERYTHROCYTE [DISTWIDTH] IN BLOOD BY AUTOMATED COUNT: 12.9 % (ref 11.9–14.6)
GLOBULIN SER CALC-MCNC: 5.3 G/DL (ref 2.3–3.5)
GLUCOSE SERPL-MCNC: 84 MG/DL (ref 65–100)
HCG UR QL: NEGATIVE
HCT VFR BLD AUTO: 44.1 % (ref 35.8–46.3)
HGB BLD-MCNC: 14.3 G/DL (ref 11.7–15.4)
IMM GRANULOCYTES # BLD AUTO: 0.1 K/UL (ref 0–0.5)
IMM GRANULOCYTES NFR BLD AUTO: 1 % (ref 0–5)
LYMPHOCYTES # BLD: 1.3 K/UL (ref 0.5–4.6)
LYMPHOCYTES NFR BLD: 13 % (ref 13–44)
MCH RBC QN AUTO: 29.6 PG (ref 26.1–32.9)
MCHC RBC AUTO-ENTMCNC: 32.4 G/DL (ref 31.4–35)
MCV RBC AUTO: 91.3 FL (ref 79.6–97.8)
MONOCYTES # BLD: 0.8 K/UL (ref 0.1–1.3)
MONOCYTES NFR BLD: 8 % (ref 4–12)
NEUTS SEG # BLD: 7.2 K/UL (ref 1.7–8.2)
NEUTS SEG NFR BLD: 75 % (ref 43–78)
NRBC # BLD: 0 K/UL (ref 0–0.2)
PLATELET # BLD AUTO: 350 K/UL (ref 150–450)
PMV BLD AUTO: 10.2 FL (ref 9.4–12.3)
POTASSIUM SERPL-SCNC: 4 MMOL/L (ref 3.5–5.1)
PROT SERPL-MCNC: 8.7 G/DL (ref 6.3–8.2)
RBC # BLD AUTO: 4.83 M/UL (ref 4.05–5.2)
SERVICE CMNT-IMP: NORMAL
SODIUM SERPL-SCNC: 139 MMOL/L (ref 136–145)
WBC # BLD AUTO: 9.6 K/UL (ref 4.3–11.1)
WET PREP GENITAL: NORMAL
WET PREP GENITAL: NORMAL

## 2019-12-29 PROCEDURE — 93975 VASCULAR STUDY: CPT

## 2019-12-29 PROCEDURE — 80053 COMPREHEN METABOLIC PANEL: CPT

## 2019-12-29 PROCEDURE — 85025 COMPLETE CBC W/AUTO DIFF WBC: CPT

## 2019-12-29 PROCEDURE — 81025 URINE PREGNANCY TEST: CPT

## 2019-12-29 PROCEDURE — 87210 SMEAR WET MOUNT SALINE/INK: CPT

## 2019-12-29 PROCEDURE — 87491 CHLMYD TRACH DNA AMP PROBE: CPT

## 2019-12-29 PROCEDURE — 81003 URINALYSIS AUTO W/O SCOPE: CPT | Performed by: EMERGENCY MEDICINE

## 2019-12-29 PROCEDURE — 74011250637 HC RX REV CODE- 250/637: Performed by: EMERGENCY MEDICINE

## 2019-12-29 PROCEDURE — 99284 EMERGENCY DEPT VISIT MOD MDM: CPT | Performed by: EMERGENCY MEDICINE

## 2019-12-29 RX ORDER — CEPHALEXIN 500 MG/1
500 CAPSULE ORAL 4 TIMES DAILY
Qty: 40 CAP | Refills: 0 | Status: SHIPPED | OUTPATIENT
Start: 2019-12-29 | End: 2020-01-08

## 2019-12-29 RX ORDER — PHENAZOPYRIDINE HYDROCHLORIDE 200 MG/1
200 TABLET, FILM COATED ORAL 3 TIMES DAILY
Qty: 6 TAB | Refills: 0 | Status: SHIPPED | OUTPATIENT
Start: 2019-12-29 | End: 2019-12-31

## 2019-12-29 RX ORDER — FLUOCINOLONE ACETONIDE 0.25 MG/G
OINTMENT TOPICAL 2 TIMES DAILY
Qty: 15 G | Refills: 1 | Status: SHIPPED | OUTPATIENT
Start: 2019-12-29 | End: 2019-12-29 | Stop reason: SDUPTHER

## 2019-12-29 RX ORDER — ACETAMINOPHEN 500 MG
1000 TABLET ORAL
Status: COMPLETED | OUTPATIENT
Start: 2019-12-29 | End: 2019-12-29

## 2019-12-29 RX ORDER — CEPHALEXIN 500 MG/1
500 CAPSULE ORAL
Status: DISCONTINUED | OUTPATIENT
Start: 2019-12-29 | End: 2019-12-29

## 2019-12-29 RX ORDER — PHENAZOPYRIDINE HYDROCHLORIDE 95 MG/1
200 TABLET ORAL
Status: DISCONTINUED | OUTPATIENT
Start: 2019-12-29 | End: 2019-12-29

## 2019-12-29 RX ORDER — METRONIDAZOLE 500 MG/1
500 TABLET ORAL 2 TIMES DAILY
Qty: 20 TAB | Refills: 0 | Status: SHIPPED | OUTPATIENT
Start: 2019-12-29 | End: 2020-01-08

## 2019-12-29 RX ORDER — FLUOCINOLONE ACETONIDE 0.25 MG/G
OINTMENT TOPICAL 2 TIMES DAILY
Qty: 15 G | Refills: 1 | Status: SHIPPED | OUTPATIENT
Start: 2019-12-29

## 2019-12-29 RX ORDER — METRONIDAZOLE 500 MG/1
500 TABLET ORAL
Status: DISCONTINUED | OUTPATIENT
Start: 2019-12-29 | End: 2019-12-29

## 2019-12-29 RX ADMIN — ACETAMINOPHEN 1000 MG: 500 TABLET, FILM COATED ORAL at 08:08

## 2019-12-29 NOTE — ED NOTES
I have reviewed discharge instructions with the patient. The patient verbalized understanding. Patient left ED via Discharge Method: ambulatory to Home with self. Opportunity for questions and clarification provided. Patient given 1 scripts. To continue your aftercare when you leave the hospital, you may receive an automated call from our care team to check in on how you are doing. This is a free service and part of our promise to provide the best care and service to meet your aftercare needs.  If you have questions, or wish to unsubscribe from this service please call [phone]. Thank you for Choosing our [facility] Emergency Department.

## 2019-12-29 NOTE — ED PROVIDER NOTES
The history is provided by the patient. Urinary Frequency    This is a new problem. The current episode started more than 2 days ago. The problem occurs intermittently. The problem has been gradually worsening. The quality of the pain is described as aching. The pain is moderate. There has been no fever. She is not sexually active. There is no history of pyelonephritis. Associated symptoms include frequency and urgency. Pertinent negatives include no chills, no nausea, no vomiting, no hesitancy, no flank pain and no abdominal pain. The patient is not pregnant. She has tried nothing for the symptoms. Past Medical History:   Diagnosis Date    Arthritis        Past Surgical History:   Procedure Laterality Date    HX GYN          HX ORTHOPAEDIC      cyst behind right knee         No family history on file.     Social History     Socioeconomic History    Marital status: SINGLE     Spouse name: Not on file    Number of children: Not on file    Years of education: Not on file    Highest education level: Not on file   Occupational History    Not on file   Social Needs    Financial resource strain: Not on file    Food insecurity:     Worry: Not on file     Inability: Not on file    Transportation needs:     Medical: Not on file     Non-medical: Not on file   Tobacco Use    Smoking status: Current Every Day Smoker     Packs/day: 1.00    Smokeless tobacco: Never Used   Substance and Sexual Activity    Alcohol use: No    Drug use: No    Sexual activity: Not on file   Lifestyle    Physical activity:     Days per week: Not on file     Minutes per session: Not on file    Stress: Not on file   Relationships    Social connections:     Talks on phone: Not on file     Gets together: Not on file     Attends Holiness service: Not on file     Active member of club or organization: Not on file     Attends meetings of clubs or organizations: Not on file     Relationship status: Not on file    Intimate partner violence:     Fear of current or ex partner: Not on file     Emotionally abused: Not on file     Physically abused: Not on file     Forced sexual activity: Not on file   Other Topics Concern    Not on file   Social History Narrative    Not on file         ALLERGIES: Patient has no known allergies. Review of Systems   Constitutional: Negative for chills and fever. HENT: Negative for congestion, ear pain and rhinorrhea. Eyes: Negative for photophobia and discharge. Respiratory: Negative for cough and shortness of breath. Cardiovascular: Negative for chest pain and palpitations. Gastrointestinal: Negative for abdominal pain, constipation, diarrhea, nausea and vomiting. Endocrine: Negative for cold intolerance and heat intolerance. Genitourinary: Positive for frequency and urgency. Negative for dysuria, flank pain and hesitancy. Musculoskeletal: Negative for arthralgias, myalgias and neck pain. Skin: Negative for rash and wound. Allergic/Immunologic: Negative for environmental allergies and food allergies. Neurological: Negative for syncope and headaches. Hematological: Negative for adenopathy. Does not bruise/bleed easily. Psychiatric/Behavioral: Negative for dysphoric mood. The patient is not nervous/anxious. All other systems reviewed and are negative. Vitals:    12/29/19 0736   BP: 124/74   Pulse: 95   Resp: 16   Temp: (!) 101 °F (38.3 °C)   SpO2: 94%   Weight: 59 kg (130 lb)   Height: 5' 6\" (1.676 m)            Physical Exam  Vitals signs and nursing note reviewed. Constitutional:       General: She is in acute distress. Appearance: Normal appearance. She is well-developed and normal weight. HENT:      Head: Normocephalic and atraumatic. Right Ear: External ear normal.      Left Ear: External ear normal.      Mouth/Throat:      Pharynx: Oropharynx is clear. No oropharyngeal exudate.    Eyes:      Conjunctiva/sclera: Conjunctivae normal.      Pupils: Pupils are equal, round, and reactive to light. Neck:      Musculoskeletal: Normal range of motion and neck supple. Vascular: No JVD. Cardiovascular:      Rate and Rhythm: Normal rate and regular rhythm. Heart sounds: Normal heart sounds. No murmur. No friction rub. No gallop. Pulmonary:      Effort: Pulmonary effort is normal.      Breath sounds: Normal breath sounds. Abdominal:      General: Bowel sounds are normal. There is no distension. Palpations: Abdomen is soft. There is no mass. Tenderness: There is no tenderness. There is no guarding or rebound. Musculoskeletal: Normal range of motion. General: No deformity. Skin:     General: Skin is warm and dry. Capillary Refill: Capillary refill takes less than 2 seconds. Findings: No rash. Neurological:      General: No focal deficit present. Mental Status: She is alert and oriented to person, place, and time. Cranial Nerves: No cranial nerve deficit. Sensory: No sensory deficit. Gait: Gait normal.   Psychiatric:         Mood and Affect: Mood normal.         Speech: Speech normal.         Behavior: Behavior normal.         Thought Content: Thought content normal.         Judgment: Judgment normal.          MDM  Number of Diagnoses or Management Options  Bilateral lower abdominal discomfort: new and requires workup  Lichen sclerosus of female genitalia: new and requires workup  Urinary frequency: new and requires workup  Diagnosis management comments: Urine dip is unremarkable today. With the patient's current symptoms, I am going to go ahead and cover her for UTI as well as possible BV. She is nontoxic-appearing despite having a fever of 101. At this point given her clinical findings, I do not believe that laboratory work-up would change my plan or disposition. Does have a history of psoriatic arthritis but no other significant medical issues.   She is not currently on any immuno suppressive agents  I will also be referring the patient to our  to assist her with establishing primary care    I discussed the results of all labs, procedures, radiographs, and treatments with the patient and available family.  Treatment plan is agreed upon and the patient is ready for discharge.  Questions about treatment in the ED and differential diagnosis of presenting condition were answered. Jose Mead was given verbal discharge instructions including, but not limited to, importance of returning to the emergency department for any concern of worsening or continued symptoms.  Instructions were given to follow up with a primary care provider or specialist within 1-2 days. Alina Olsen effects of medications, if prescribed, were discussed and patient was advised to refrain from significant physical activity until followed up by primary care physician and to not drive or operate heavy machinery after taking any sedating substances.      8:45 AM  Back to patient's bedside to discuss urine results and plan of care. Reviewed findings with the patient. She stated that she was concerned that we did not have a definitive answer for her symptoms. We again discussed that lab work could be done but was not likely to significantly impact our disposition plan. Also stated that we could perform a pelvic exam to evaluate this lower abdominal pressure but this would also be low yield since she has not been sexually active in over 2 years. She has had no vaginal discharge. Nor has she had any symptoms that would raise concern for possible vaginal candidiasis. At that point the patient again stated that she was concerned about leaving without a definite I tried to explain that we often have difficulty pinpointing an exact cause of lower abdominal/pelvic discomfort. Patient became more frustrated and asked to see another doctor. When I told her that I was her doctor for this ER encounter she stated \"I do not accept that. \" He then asked to speak with \"the person in charge. \"  I have asked the charge nurse to go back and and discuss the patient's wants and needs. We will proceed with blood work, pelvic exam, ultrasound as indicated if the patient is willing to continue the work-up. Patient remains nontoxic-appearing    12:34 PM  Blood work, wet prep and ultrasound are all within normal limits. We will go ahead and treat the lichen sclerosis with topical steroids. Once again patient will be referred for outpatient management. And will list the assistance of case management and helping her find a primary care physician. Amount and/or Complexity of Data Reviewed  Clinical lab tests: ordered and reviewed  Tests in the radiology section of CPT®: ordered and reviewed  Tests in the medicine section of CPT®: ordered and reviewed  Review and summarize past medical records: yes    Risk of Complications, Morbidity, and/or Mortality  Presenting problems: moderate  Diagnostic procedures: low  Management options: low  General comments: Elements of this note have been dictated via voice recognition software. Text and phrases may be limited by the accuracy of the software. The chart has been reviewed, but errors may still be present.       Patient Progress  Patient progress: stable         Procedures

## 2019-12-29 NOTE — ED NOTES
This RN spoke with patient, pt agreed to have pelvic exam completed as well as blood work completed - pt given blanket for comfort - provider notified patient is ready for exam

## 2019-12-29 NOTE — ED NOTES
9:45 AM  Dr Maddie Stockton asked me to complete pelvic exam. I went to introduce myself. I knocked on door, entered room, and said hello. Immediately before I could say another word, pt began crying, telling me there was no compassion in this hospital, and she wanted to speak to the supervisor. I told her Dr Maddie Stockton asked me to do her pelvic exam, and she said she wanted the supervisor. I told her I would get the charge nurse, and as I left, she shouted she wanted a supervisor, not a nurse.

## 2019-12-29 NOTE — ED TRIAGE NOTES
Patient reports cloudy urine, increased urination and lower abdominal cramping since Friday.  Denies n/v/d, cough or congestion

## 2019-12-29 NOTE — DISCHARGE INSTRUCTIONS
Apply ointment twice daily as prescribed. Topical: Not for oral, ophthalmic, or intravaginal use;     Cream, ointment, topical solution: Apply sparingly in a thin film; rub in lightly. In hairy sites, the hair should be parted to allow direct contact with the lesion. Use tylenol and motrin for fever control. Alternate doses of each at three hour intervals for temperature over 100.4 F  Call your doctor/follow up doctor to set up appointment for recheck visit  Return to ER for any worsening symptoms or new problems which may arise    I will give your information to our  to assist you with establishing primary care.

## 2019-12-29 NOTE — ED NOTES
10:23 AM  Pelvic exam completed with Emmett Barnhart RN chaperone. Pt on initial exam with red and hard tissue to vulva, from pubis to anus. Edema present. Moderately tender. Pt then tells us she has had this rash for over a year, and no one has been able to tell her what it is, or help her to get it better. Cervix noted, with no abnormality. Walls of vagina normal, except on her right side - more at introitus, with pallor noted. Swabs obtained. On bimanual exam, she had no pain, but felt pressure. She seemed to be relieved when I told her a name of what I observed - lichen sclerosis. I have discussed with Dr Lisa Robles.

## 2020-03-15 ENCOUNTER — HOSPITAL ENCOUNTER (EMERGENCY)
Age: 40
Discharge: LEFT BEFORE DISCHARGE | End: 2020-03-15
Attending: EMERGENCY MEDICINE
Payer: SELF-PAY

## 2020-03-15 PROCEDURE — 75810000275 HC EMERGENCY DEPT VISIT NO LEVEL OF CARE

## 2020-11-02 ENCOUNTER — HOSPITAL ENCOUNTER (EMERGENCY)
Age: 40
Discharge: HOME | End: 2020-11-02
Attending: EMERGENCY MEDICINE

## 2020-11-02 VITALS
WEIGHT: 149 LBS | BODY MASS INDEX: 23.95 KG/M2 | DIASTOLIC BLOOD PRESSURE: 76 MMHG | HEART RATE: 88 BPM | HEIGHT: 66 IN | OXYGEN SATURATION: 98 % | RESPIRATION RATE: 18 BRPM | SYSTOLIC BLOOD PRESSURE: 128 MMHG | TEMPERATURE: 98 F

## 2020-11-02 DIAGNOSIS — B37.31 VAGINAL YEAST INFECTION: Primary | ICD-10-CM

## 2020-11-02 DIAGNOSIS — L40.9 PSORIASIS: ICD-10-CM

## 2020-11-02 DIAGNOSIS — L73.9 FOLLICULITIS: ICD-10-CM

## 2020-11-02 LAB
BACTERIA URNS QL MICRO: 0 /HPF
CASTS URNS QL MICRO: 0 /LPF
EPI CELLS #/AREA URNS HPF: NORMAL /HPF
HCG UR QL: NEGATIVE
RBC #/AREA URNS HPF: 0 /HPF
SERVICE CMNT-IMP: NORMAL
WBC URNS QL MICRO: NORMAL /HPF
WET PREP GENITAL: NORMAL

## 2020-11-02 PROCEDURE — 87491 CHLMYD TRACH DNA AMP PROBE: CPT

## 2020-11-02 PROCEDURE — 81025 URINE PREGNANCY TEST: CPT

## 2020-11-02 PROCEDURE — 81015 MICROSCOPIC EXAM OF URINE: CPT

## 2020-11-02 PROCEDURE — 99284 EMERGENCY DEPT VISIT MOD MDM: CPT

## 2020-11-02 PROCEDURE — 96372 THER/PROPH/DIAG INJ SC/IM: CPT

## 2020-11-02 PROCEDURE — 87210 SMEAR WET MOUNT SALINE/INK: CPT

## 2020-11-02 PROCEDURE — 74011000250 HC RX REV CODE- 250: Performed by: EMERGENCY MEDICINE

## 2020-11-02 PROCEDURE — 74011250637 HC RX REV CODE- 250/637: Performed by: EMERGENCY MEDICINE

## 2020-11-02 PROCEDURE — 74011250636 HC RX REV CODE- 250/636: Performed by: EMERGENCY MEDICINE

## 2020-11-02 RX ORDER — AZITHROMYCIN 250 MG/1
1000 TABLET, FILM COATED ORAL
Status: COMPLETED | OUTPATIENT
Start: 2020-11-02 | End: 2020-11-02

## 2020-11-02 RX ORDER — SULFAMETHOXAZOLE AND TRIMETHOPRIM 800; 160 MG/1; MG/1
1 TABLET ORAL 2 TIMES DAILY
Qty: 14 TAB | Refills: 0 | Status: SHIPPED | OUTPATIENT
Start: 2020-11-02 | End: 2020-11-09

## 2020-11-02 RX ORDER — FLUCONAZOLE 100 MG/1
200 TABLET ORAL
Status: COMPLETED | OUTPATIENT
Start: 2020-11-02 | End: 2020-11-02

## 2020-11-02 RX ADMIN — FLUCONAZOLE 200 MG: 100 TABLET ORAL at 09:21

## 2020-11-02 RX ADMIN — AZITHROMYCIN MONOHYDRATE 1000 MG: 250 TABLET ORAL at 09:00

## 2020-11-02 RX ADMIN — LIDOCAINE HYDROCHLORIDE 250 MG: 10 INJECTION, SOLUTION INFILTRATION; PERINEURAL at 09:00

## 2020-11-02 NOTE — DISCHARGE INSTRUCTIONS
Follow-up with the health department for further testing. Soak in warm Epson salt bath daily. Return for worsening or concerning symptoms.

## 2020-11-02 NOTE — ED PROVIDER NOTES
57-year-old female presents with vaginal pain and generally not feeling well for the past several weeks. She was seen by her primary care physician and placed on an unknown antibiotic for possible UTI. She was told she had blood in her urine, but no other signs of infection. Symptoms improved, but then worsened again 2 days ago. She reports a stinging sensation around her urethra constantly with urinary frequency. She has occasional white vaginal discharge. Also reports intermittent swelling in her left bikini line for the past 6 months. It has been draining. She has had similar boils in the past that drained spontaneously. She has history of psoriasis and psoriatic arthritis. She is very anxious due to recent unprotected sex a few months ago. She reports occasional night sweats without documented fevers. Bladder Infection    Associated symptoms include frequency. Pertinent negatives include no chills, no nausea and no vomiting. Past Medical History:   Diagnosis Date    Arthritis     Psoriasis        Past Surgical History:   Procedure Laterality Date    HX GYN          HX ORTHOPAEDIC      cyst behind right knee         History reviewed. No pertinent family history.     Social History     Socioeconomic History    Marital status: SINGLE     Spouse name: Not on file    Number of children: Not on file    Years of education: Not on file    Highest education level: Not on file   Occupational History    Not on file   Social Needs    Financial resource strain: Not on file    Food insecurity     Worry: Not on file     Inability: Not on file    Transportation needs     Medical: Not on file     Non-medical: Not on file   Tobacco Use    Smoking status: Current Every Day Smoker     Packs/day: 1.00    Smokeless tobacco: Never Used   Substance and Sexual Activity    Alcohol use: No    Drug use: No    Sexual activity: Not on file   Lifestyle    Physical activity     Days per week: Not on file     Minutes per session: Not on file    Stress: Not on file   Relationships    Social connections     Talks on phone: Not on file     Gets together: Not on file     Attends Mormon service: Not on file     Active member of club or organization: Not on file     Attends meetings of clubs or organizations: Not on file     Relationship status: Not on file    Intimate partner violence     Fear of current or ex partner: Not on file     Emotionally abused: Not on file     Physically abused: Not on file     Forced sexual activity: Not on file   Other Topics Concern    Not on file   Social History Narrative    Not on file         ALLERGIES: Prednisone    Review of Systems   Constitutional: Positive for diaphoresis and fatigue. Negative for chills and fever. HENT: Negative for hearing loss. Eyes: Negative for visual disturbance. Respiratory: Negative for cough and shortness of breath. Cardiovascular: Negative for chest pain and palpitations. Gastrointestinal: Negative for abdominal pain, diarrhea, nausea and vomiting. Genitourinary: Positive for dysuria, frequency and vaginal discharge. Negative for vaginal bleeding. Musculoskeletal: Negative for back pain. Skin: Positive for rash. Neurological: Negative for weakness and headaches. Psychiatric/Behavioral: Negative for confusion. Vitals:    11/02/20 0708   BP: 139/84   Pulse: 94   Resp: 16   Temp: 98.2 °F (36.8 °C)   SpO2: 99%   Weight: 67.6 kg (149 lb)   Height: 5' 6\" (1.676 m)            Physical Exam  Vitals signs and nursing note reviewed. Exam conducted with a chaperone present. Constitutional:       Appearance: She is well-developed. HENT:      Head: Normocephalic and atraumatic. Eyes:      Pupils: Pupils are equal, round, and reactive to light. Neck:      Musculoskeletal: Normal range of motion and neck supple. Cardiovascular:      Rate and Rhythm: Regular rhythm. Heart sounds: Normal heart sounds. Pulmonary:      Effort: Pulmonary effort is normal.      Breath sounds: Normal breath sounds. Abdominal:      Palpations: Abdomen is soft. Tenderness: There is no abdominal tenderness. Genitourinary:     Exam position: Lithotomy position. Labia:         Right: Rash present. Left: Rash and tenderness present. Cervix: Normal.      Uterus: Normal.       Adnexa: Right adnexa normal and left adnexa normal.       Musculoskeletal: Normal range of motion. Skin:     General: Skin is warm and dry. Findings: Rash present. Comments: Scattered psoriatic rash   Neurological:      Mental Status: She is alert. Psychiatric:         Behavior: Behavior normal.          MDM  Number of Diagnoses or Management Options  Diagnosis management comments: Parts of this document were created using dragon voice recognition software. The chart has been reviewed but errors may still be present. I wore appropriate PPE throughout this patient's ED visit. Emerita Nation MD, 9:06 AM    Treated for possible GC chlamydia. Swab sent. Left groin indurated without fluctuance for drainage. Will place on Bactrim. Advised health department follow-up. I discussed the results of all labs, procedures, radiographs, and treatments with the patient and available family. Treatment plan is agreed upon and the patient is ready for discharge. Questions about treatment in the ED and differential diagnosis of presenting condition were answered. Patient was given verbal discharge instructions including, but not limited to, importance of returning to the emergency department for any concern of worsening or continued symptoms. Instructions were given to follow up with a primary care provider or specialist within 1-2 days.   Adverse effects of medications, if prescribed, were discussed and patient was advised to refrain from significant physical activity until followed up by primary care physician and to not drive or operate heavy machinery after taking any sedating substances.            Amount and/or Complexity of Data Reviewed  Clinical lab tests: ordered and reviewed (Results for orders placed or performed during the hospital encounter of 11/02/20  -WET PREP  Specimen: Cervix       Result                      Value             Ref Range           Special Requests:                                             NO SPECIAL REQUESTS       Wet prep                                                      0 TO 3   WBCS SEEN   PER HPF          Wet prep                    FEW YEAST                             Wet prep                                                      NO TRICHOMONAS SEEN       Wet prep                                                      CLUE CELLS ABSENT  -URINE MICROSCOPIC       Result                      Value             Ref Range           WBC                         0-3               0 /hpf              RBC                         0                 0 /hpf              Epithelial cells            0-3               0 /hpf              Bacteria                    0                 0 /hpf              Casts                       0                 0 /lpf         -HCG URINE, QL       Result                      Value             Ref Range           HCG urine, QL               Negative          NEG            )  Tests in the medicine section of CPT®: ordered and reviewed           Procedures

## 2020-11-02 NOTE — ED NOTES
I have reviewed discharge instructions with the patient. The patient verbalized understanding. Patient left ED via Discharge Method: ambulatory to Home with self. Opportunity for questions and clarification provided. Patient given 1 scripts. To continue your aftercare when you leave the hospital, you may receive an automated call from our care team to check in on how you are doing. This is a free service and part of our promise to provide the best care and service to meet your aftercare needs.  If you have questions, or wish to unsubscribe from this service please call [phone]. Thank you for Choosing our [facility] Emergency Department.

## 2020-11-02 NOTE — ED TRIAGE NOTES
Ambulatory to triage wearing mask. States her urethra burns. Denies burning with urination. States her urine seems cloudy. Denies smell to urine. Says she has a bump on her bikini line on the left. Having vaginal discharge.

## 2020-12-10 ENCOUNTER — HOSPITAL ENCOUNTER (EMERGENCY)
Age: 40
Discharge: HOME | End: 2020-12-10
Attending: EMERGENCY MEDICINE

## 2020-12-10 VITALS
HEIGHT: 66 IN | WEIGHT: 155 LBS | HEART RATE: 97 BPM | SYSTOLIC BLOOD PRESSURE: 132 MMHG | RESPIRATION RATE: 16 BRPM | DIASTOLIC BLOOD PRESSURE: 91 MMHG | OXYGEN SATURATION: 98 % | BODY MASS INDEX: 24.91 KG/M2 | TEMPERATURE: 98.3 F

## 2020-12-10 DIAGNOSIS — N30.00 ACUTE CYSTITIS WITHOUT HEMATURIA: ICD-10-CM

## 2020-12-10 DIAGNOSIS — R10.2 PELVIC PAIN: Primary | ICD-10-CM

## 2020-12-10 LAB
ALBUMIN SERPL-MCNC: 3.6 G/DL (ref 3.5–5)
ALBUMIN/GLOB SERPL: 0.6 {RATIO} (ref 1.2–3.5)
ALP SERPL-CCNC: 124 U/L (ref 50–136)
ALT SERPL-CCNC: 12 U/L (ref 12–65)
ANION GAP SERPL CALC-SCNC: 6 MMOL/L (ref 7–16)
APPEARANCE UR: CLEAR
AST SERPL-CCNC: 14 U/L (ref 15–37)
BACTERIA URNS QL MICRO: 0 /HPF
BASOPHILS # BLD: 0.1 K/UL (ref 0–0.2)
BASOPHILS NFR BLD: 1 % (ref 0–2)
BILIRUB SERPL-MCNC: 0.3 MG/DL (ref 0.2–1.1)
BILIRUB UR QL: NEGATIVE
BUN SERPL-MCNC: 3 MG/DL (ref 6–23)
CALCIUM SERPL-MCNC: 8.9 MG/DL (ref 8.3–10.4)
CASTS URNS QL MICRO: 0 /LPF
CHLORIDE SERPL-SCNC: 108 MMOL/L (ref 98–107)
CO2 SERPL-SCNC: 26 MMOL/L (ref 21–32)
COLOR UR: YELLOW
CREAT SERPL-MCNC: 0.6 MG/DL (ref 0.6–1)
DIFFERENTIAL METHOD BLD: NORMAL
EOSINOPHIL # BLD: 0.2 K/UL (ref 0–0.8)
EOSINOPHIL NFR BLD: 2 % (ref 0.5–7.8)
EPI CELLS #/AREA URNS HPF: ABNORMAL /HPF
ERYTHROCYTE [DISTWIDTH] IN BLOOD BY AUTOMATED COUNT: 13.2 % (ref 11.9–14.6)
GLOBULIN SER CALC-MCNC: 6 G/DL (ref 2.3–3.5)
GLUCOSE SERPL-MCNC: 91 MG/DL (ref 65–100)
GLUCOSE UR STRIP.AUTO-MCNC: NEGATIVE MG/DL
HCT VFR BLD AUTO: 43.3 % (ref 35.8–46.3)
HGB BLD-MCNC: 14.1 G/DL (ref 11.7–15.4)
HGB UR QL STRIP: ABNORMAL
IMM GRANULOCYTES # BLD AUTO: 0.1 K/UL (ref 0–0.5)
IMM GRANULOCYTES NFR BLD AUTO: 1 % (ref 0–5)
KETONES UR QL STRIP.AUTO: NEGATIVE MG/DL
LEUKOCYTE ESTERASE UR QL STRIP.AUTO: ABNORMAL
LYMPHOCYTES # BLD: 1.8 K/UL (ref 0.5–4.6)
LYMPHOCYTES NFR BLD: 18 % (ref 13–44)
MCH RBC QN AUTO: 29 PG (ref 26.1–32.9)
MCHC RBC AUTO-ENTMCNC: 32.6 G/DL (ref 31.4–35)
MCV RBC AUTO: 88.9 FL (ref 79.6–97.8)
MONOCYTES # BLD: 0.8 K/UL (ref 0.1–1.3)
MONOCYTES NFR BLD: 8 % (ref 4–12)
NEUTS SEG # BLD: 7.2 K/UL (ref 1.7–8.2)
NEUTS SEG NFR BLD: 71 % (ref 43–78)
NITRITE UR QL STRIP.AUTO: NEGATIVE
NRBC # BLD: 0 K/UL (ref 0–0.2)
PH UR STRIP: 6.5 [PH] (ref 5–9)
PLATELET # BLD AUTO: 396 K/UL (ref 150–450)
PMV BLD AUTO: 9.9 FL (ref 9.4–12.3)
POTASSIUM SERPL-SCNC: 3.7 MMOL/L (ref 3.5–5.1)
PROT SERPL-MCNC: 9.6 G/DL (ref 6.3–8.2)
PROT UR STRIP-MCNC: NEGATIVE MG/DL
RBC # BLD AUTO: 4.87 M/UL (ref 4.05–5.2)
RBC #/AREA URNS HPF: ABNORMAL /HPF
SODIUM SERPL-SCNC: 140 MMOL/L (ref 136–145)
SP GR UR REFRACTOMETRY: 1.01 (ref 1–1.02)
UROBILINOGEN UR QL STRIP.AUTO: 0.2 EU/DL (ref 0.2–1)
WBC # BLD AUTO: 10.2 K/UL (ref 4.3–11.1)
WBC URNS QL MICRO: ABNORMAL /HPF

## 2020-12-10 PROCEDURE — 81001 URINALYSIS AUTO W/SCOPE: CPT

## 2020-12-10 PROCEDURE — 80053 COMPREHEN METABOLIC PANEL: CPT

## 2020-12-10 PROCEDURE — 81003 URINALYSIS AUTO W/O SCOPE: CPT

## 2020-12-10 PROCEDURE — 85025 COMPLETE CBC W/AUTO DIFF WBC: CPT

## 2020-12-10 PROCEDURE — 99283 EMERGENCY DEPT VISIT LOW MDM: CPT

## 2020-12-10 RX ORDER — PHENAZOPYRIDINE HYDROCHLORIDE 95 MG/1
95 TABLET ORAL
Qty: 30 TAB | Refills: 0 | Status: SHIPPED | OUTPATIENT
Start: 2020-12-10

## 2020-12-10 RX ORDER — NITROFURANTOIN 25; 75 MG/1; MG/1
100 CAPSULE ORAL 2 TIMES DAILY
Qty: 6 CAP | Refills: 0 | Status: SHIPPED | OUTPATIENT
Start: 2020-12-10 | End: 2020-12-10 | Stop reason: SDUPTHER

## 2020-12-10 RX ORDER — NITROFURANTOIN 25; 75 MG/1; MG/1
100 CAPSULE ORAL 2 TIMES DAILY
Qty: 6 CAP | Refills: 0 | Status: SHIPPED | OUTPATIENT
Start: 2020-12-10 | End: 2020-12-13

## 2020-12-10 NOTE — ED NOTES
Pt presents emotionally labile, alternating between being anxious and tearful to argumentative to [id number] and specific with requests and suggestions for her own diagnosis.

## 2020-12-10 NOTE — ED PROVIDER NOTES
Mask was worn during the entire patient examination. Edgar Bradford is a 41 y.o. female who presents to the ED with a chief complaint of pelvic pain. She has had it for several weeks. She has been seen multiple times for this and states she has been on several treatments for yeast infections. Did have pelvic exam done in our ER recently. States she has not been sexually active since this and spent about 3 months since any sexual activity. She denies any CVA tenderness. No fever or chills. No upper abdominal pain just some pelvic pain in the suprapubic region. She is very anxious and is difficult to get history from her. She reports that her mother  from a sudden neck fracture and the  is tomorrow she would like to go to this but she does not want anything for anxiety or her nerves currently. Past Medical History:   Diagnosis Date    Arthritis     Psoriasis        Past Surgical History:   Procedure Laterality Date    HX GYN          HX ORTHOPAEDIC      cyst behind right knee         No family history on file.     Social History     Socioeconomic History    Marital status: SINGLE     Spouse name: Not on file    Number of children: Not on file    Years of education: Not on file    Highest education level: Not on file   Occupational History    Not on file   Social Needs    Financial resource strain: Not on file    Food insecurity     Worry: Not on file     Inability: Not on file    Transportation needs     Medical: Not on file     Non-medical: Not on file   Tobacco Use    Smoking status: Current Every Day Smoker     Packs/day: 1.00    Smokeless tobacco: Never Used   Substance and Sexual Activity    Alcohol use: No    Drug use: No    Sexual activity: Not on file   Lifestyle    Physical activity     Days per week: Not on file     Minutes per session: Not on file    Stress: Not on file   Relationships    Social connections     Talks on phone: Not on file Gets together: Not on file     Attends [employer] service: Not on file     Active member of club or organization: Not on file     Attends meetings of clubs or organizations: Not on file     Relationship status: Not on file    Intimate partner violence     Fear of current or ex partner: Not on file     Emotionally abused: Not on file     Physically abused: Not on file     Forced sexual activity: Not on file   Other Topics Concern    Not on file   Social History Narrative    Not on file         ALLERGIES: Prednisone    Review of Systems   Constitutional: Negative for chills and fever. Respiratory: Negative for chest tightness, shortness of breath, wheezing and stridor. Cardiovascular: Negative for chest pain, palpitations and leg swelling. Gastrointestinal: Positive for abdominal pain. Negative for abdominal distention, anal bleeding, diarrhea, nausea, rectal pain and vomiting. Musculoskeletal: Negative for arthralgias, back pain, neck pain and neck stiffness. Skin: Negative for color change, pallor and wound. Neurological: Negative for weakness and numbness. All other systems reviewed and are negative. Vitals:    12/10/20 1513   BP: 128/84   Pulse: (!) 109   Resp: 16   Temp: 99 °F (37.2 °C)   SpO2: 99%   Weight: 70.3 kg (155 lb)   Height: 5' 6\" (1.676 m)            Physical Exam  Vitals signs and nursing note reviewed. Constitutional:       General: She is not in acute distress. Appearance: Normal appearance. She is well-developed. She is not ill-appearing, toxic-appearing or diaphoretic. HENT:      Head: Normocephalic and atraumatic. Eyes:      General: No scleral icterus. Conjunctiva/sclera: Conjunctivae normal.   Neck:      Musculoskeletal: Normal range of motion. No neck rigidity or muscular tenderness. Cardiovascular:      Rate and Rhythm: Normal rate and regular rhythm. Pulses: Normal pulses. Heart sounds: No murmur. No friction rub. No gallop.     Pulmonary: Effort: Pulmonary effort is normal. No respiratory distress. Breath sounds: No stridor. No wheezing, rhonchi or rales. Abdominal:      General: Abdomen is flat. There is no distension. Palpations: Abdomen is soft. Tenderness: There is abdominal tenderness (superpubic tenderness). There is no guarding or rebound. Musculoskeletal: Normal range of motion. General: No swelling, tenderness, deformity or signs of injury. Skin:     General: Skin is warm. Capillary Refill: Capillary refill takes less than 2 seconds. Coloration: Skin is not jaundiced or pale. Findings: No bruising, erythema or lesion. Neurological:      General: No focal deficit present. Mental Status: She is alert and oriented to person, place, and time. Mental status is at baseline. Psychiatric:         Mood and Affect: Mood normal.         Behavior: Behavior normal.         Thought Content: Thought content normal.          MDM  Number of Diagnoses or Management Options  Diagnosis management comments: Patients recent GC - chlamydia was negative. I had a long discussion with her about her symptoms. There are some residual leukocytes in her urine we will place her on Macrobid and send medic treatment. We will refer to [facility], [facility] clinic as she does not have insurance and no PCP which I feel that she will strongly benefit from. She also has component of anxiety that may be contributing to her symptoms. Jamari Parada MD; 12/10/2020 @8:17 PM Voice dictation software was used during the making of this note. This software is not perfect and grammatical and other typographical errors may be present.   This note has not been proofread for errors.  ===================================================================          Amount and/or Complexity of Data Reviewed  Clinical lab tests: reviewed and ordered (Results for orders placed or performed during the hospital encounter of 12/10/20  -CBC WITH AUTOMATED DIFF       Result                      Value             Ref Range           WBC                         10.2              4.3 - 11.1 K*       RBC                         4.87              4.05 - 5.2 M*       HGB                         14.1              11.7 - 15.4 *       HCT                         43.3              35.8 - 46.3 %       MCV                         88.9              79.6 - 97.8 *       MCH                         29.0              26.1 - 32.9 *       MCHC                        32.6              31.4 - 35.0 *       RDW                         13.2              11.9 - 14.6 %       PLATELET                    396               150 - 450 K/*       MPV                         9.9               9.4 - 12.3 FL       ABSOLUTE NRBC               0.00              0.0 - 0.2 K/*       DF                          AUTOMATED                             NEUTROPHILS                 71                43 - 78 %           LYMPHOCYTES                 18                13 - 44 %           MONOCYTES                   8                 4.0 - 12.0 %        EOSINOPHILS                 2                 0.5 - 7.8 %         BASOPHILS                   1                 0.0 - 2.0 %         IMMATURE GRANULOCYTES       1                 0.0 - 5.0 %         ABS. NEUTROPHILS            7.2               1.7 - 8.2 K/*       ABS. LYMPHOCYTES            1.8               0.5 - 4.6 K/*       ABS. MONOCYTES              0.8               0.1 - 1.3 K/*       ABS. EOSINOPHILS            0.2               0.0 - 0.8 K/*       ABS. BASOPHILS              0.1               0.0 - 0.2 K/*       ABS. IMM.  GRANS.            0.1               0.0 - 0.5 K/*  -METABOLIC PANEL, COMPREHENSIVE       Result                      Value             Ref Range           Sodium                      140               136 - 145 mm*       Potassium                   3.7               3.5 - 5.1 mm*       Chloride                    108 (H) 98 - 107 mmo*       CO2                         26                21 - 32 mmol*       Anion gap                   6 (L)             7 - 16 mmol/L       Glucose                     91                65 - 100 mg/*       BUN                         3 (L)             6 - 23 MG/DL        Creatinine                  0.60              0.6 - 1.0 MG*       GFR est AA                  >60               >60 ml/min/1*       GFR est non-AA              >60               >60 ml/min/1*       Calcium                     8.9               8.3 - 10.4 M*       Bilirubin, total            0.3               0.2 - 1.1 MG*       ALT (SGPT)                  12                12 - 65 U/L         AST (SGOT)                  14 (L)            15 - 37 U/L         Alk.  phosphatase            124               50 - 136 U/L        Protein, total              9.6 (H)           6.3 - 8.2 g/*       Albumin                     3.6               3.5 - 5.0 g/*       Globulin                    6.0 (H)           2.3 - 3.5 g/*       A-G Ratio                   0.6 (L)           1.2 - 3.5      -URINALYSIS W/ RFLX MICROSCOPIC       Result                      Value             Ref Range           Color                       YELLOW                                Appearance                  CLEAR                                 Specific gravity            1.007             1.001 - 1.02*       pH (UA)                     6.5               5.0 - 9.0           Protein                     Negative          NEG mg/dL           Glucose                     Negative          mg/dL               Ketone                      Negative          NEG mg/dL           Bilirubin                   Negative          NEG                 Blood                       TRACE (A)         NEG                 Urobilinogen                0.2               0.2 - 1.0 EU*       Nitrites                    Negative          NEG                 Leukocyte Esterase          SMALL (A) NEG                 WBC                         0-3               0 /hpf              RBC                         0-3               0 /hpf              Epithelial cells            0-3               0 /hpf              Bacteria                    0                 0 /hpf              Casts                       0                 0 /lpf        )           Procedures

## 2020-12-10 NOTE — ED TRIAGE NOTES
Pt ambulatory to triage wearing a mask. Pt ambulates with a limp. Pt reports pelvic pain and increased urination x 1. 5 months. Pt reports she has been seen and treated for yeast infection at [facility]. Pt reports she took both doses of diflucan w/out improvement of symptoms. Pt reports she was checked for a UTI but not prescribed ABx. Pt states, \"They told me I had some blood in my urine but everything else was clear. \" Review of pt's chart shows that pt was first seen at this facility for similar symptoms 19 and again on 20. Pt states she is not sexually active. Pt reports she is \"at the very end of my menstrual cycle. \" Pt is concerned about possible PID, kidney or uterine infection. Pt denies fever, body aches, chills, constipation, or n/v/d. Pt denies any known COVID exposure. Pt presents emotional and states, \"My mom fell and broke her neck. Her  is tomorrow. \"

## 2020-12-11 NOTE — ED NOTES
I have reviewed discharge instructions with the patient. The patient verbalized understanding. Patient left ED via Discharge Method: ambulatory to Home with self. Opportunity for questions and clarification provided. Patient given 2 scripts. To continue your aftercare when you leave the hospital, you may receive an automated call from our care team to check in on how you are doing. This is a free service and part of our promise to provide the best care and service to meet your aftercare needs.  If you have questions, or wish to unsubscribe from this service please call [phone]. Thank you for Choosing our [facility] Emergency Department.

## 2021-01-01 NOTE — ED NOTES
I have reviewed discharge instructions with the patient. The patient verbalized understanding. Patient left ED via Discharge Method: ambulatory to Home with (SELF). Opportunity for questions and clarification provided. Patient given 1 scripts. To continue your aftercare when you leave the hospital, you may receive an automated call from our care team to check in on how you are doing. This is a free service and part of our promise to provide the best care and service to meet your aftercare needs.  If you have questions, or wish to unsubscribe from this service please call [phone]. Thank you for Choosing our [facility] Emergency Department.
Verbal/written post procedure instructions were given to patient/caregiver

## 2021-07-12 ENCOUNTER — HOSPITAL ENCOUNTER (EMERGENCY)
Age: 41
Discharge: HOME | End: 2021-07-12
Attending: EMERGENCY MEDICINE

## 2021-07-12 VITALS
WEIGHT: 165 LBS | HEART RATE: 70 BPM | SYSTOLIC BLOOD PRESSURE: 113 MMHG | OXYGEN SATURATION: 98 % | BODY MASS INDEX: 26.52 KG/M2 | RESPIRATION RATE: 20 BRPM | HEIGHT: 66 IN | DIASTOLIC BLOOD PRESSURE: 72 MMHG | TEMPERATURE: 99.8 F

## 2021-07-12 DIAGNOSIS — L73.9 FOLLICULITIS: Primary | ICD-10-CM

## 2021-07-12 LAB
ALBUMIN SERPL-MCNC: 3.4 G/DL (ref 3.5–5)
ALBUMIN/GLOB SERPL: 0.6 {RATIO} (ref 1.2–3.5)
ALP SERPL-CCNC: 106 U/L (ref 50–136)
ALT SERPL-CCNC: 13 U/L (ref 12–65)
ANION GAP SERPL CALC-SCNC: 6 MMOL/L (ref 7–16)
AST SERPL-CCNC: 14 U/L (ref 15–37)
ATRIAL RATE: 111 BPM
BACTERIA URNS QL MICRO: 0 /HPF
BASOPHILS # BLD: 0.1 K/UL (ref 0–0.2)
BASOPHILS NFR BLD: 1 % (ref 0–2)
BILIRUB SERPL-MCNC: 0.3 MG/DL (ref 0.2–1.1)
BUN SERPL-MCNC: 6 MG/DL (ref 6–23)
CALCIUM SERPL-MCNC: 8.8 MG/DL (ref 8.3–10.4)
CALCULATED P AXIS, ECG09: 80 DEGREES
CALCULATED R AXIS, ECG10: 75 DEGREES
CALCULATED T AXIS, ECG11: 74 DEGREES
CASTS URNS QL MICRO: NORMAL /LPF
CHLORIDE SERPL-SCNC: 107 MMOL/L (ref 98–107)
CO2 SERPL-SCNC: 25 MMOL/L (ref 21–32)
CREAT SERPL-MCNC: 0.6 MG/DL (ref 0.6–1)
DIAGNOSIS, 93000: NORMAL
DIFFERENTIAL METHOD BLD: ABNORMAL
EOSINOPHIL # BLD: 0.2 K/UL (ref 0–0.8)
EOSINOPHIL NFR BLD: 2 % (ref 0.5–7.8)
EPI CELLS #/AREA URNS HPF: NORMAL /HPF
ERYTHROCYTE [DISTWIDTH] IN BLOOD BY AUTOMATED COUNT: 14 % (ref 11.9–14.6)
GLOBULIN SER CALC-MCNC: 5.8 G/DL (ref 2.3–3.5)
GLUCOSE SERPL-MCNC: 148 MG/DL (ref 65–100)
HCT VFR BLD AUTO: 43.9 % (ref 35.8–46.3)
HGB BLD-MCNC: 13.9 G/DL (ref 11.7–15.4)
IMM GRANULOCYTES # BLD AUTO: 0.1 K/UL (ref 0–0.5)
IMM GRANULOCYTES NFR BLD AUTO: 1 % (ref 0–5)
LYMPHOCYTES # BLD: 0.9 K/UL (ref 0.5–4.6)
LYMPHOCYTES NFR BLD: 8 % (ref 13–44)
MAGNESIUM SERPL-MCNC: 2.4 MG/DL (ref 1.8–2.4)
MCH RBC QN AUTO: 27.7 PG (ref 26.1–32.9)
MCHC RBC AUTO-ENTMCNC: 31.7 G/DL (ref 31.4–35)
MCV RBC AUTO: 87.6 FL (ref 79.6–97.8)
MONOCYTES # BLD: 0.6 K/UL (ref 0.1–1.3)
MONOCYTES NFR BLD: 6 % (ref 4–12)
NEUTS SEG # BLD: 8.6 K/UL (ref 1.7–8.2)
NEUTS SEG NFR BLD: 83 % (ref 43–78)
NRBC # BLD: 0 K/UL (ref 0–0.2)
P-R INTERVAL, ECG05: 134 MS
PLATELET # BLD AUTO: 395 K/UL (ref 150–450)
PMV BLD AUTO: 10.1 FL (ref 9.4–12.3)
POTASSIUM SERPL-SCNC: 3.9 MMOL/L (ref 3.5–5.1)
PROT SERPL-MCNC: 9.2 G/DL (ref 6.3–8.2)
Q-T INTERVAL, ECG07: 324 MS
QRS DURATION, ECG06: 72 MS
QTC CALCULATION (BEZET), ECG08: 440 MS
RBC # BLD AUTO: 5.01 M/UL (ref 4.05–5.2)
RBC #/AREA URNS HPF: NORMAL /HPF
SODIUM SERPL-SCNC: 138 MMOL/L (ref 136–145)
TSH SERPL DL<=0.005 MIU/L-ACNC: 3.09 UIU/ML (ref 0.36–3.74)
VENTRICULAR RATE, ECG03: 111 BPM
WBC # BLD AUTO: 10.4 K/UL (ref 4.3–11.1)
WBC URNS QL MICRO: 0 /HPF

## 2021-07-12 PROCEDURE — 93005 ELECTROCARDIOGRAM TRACING: CPT | Performed by: STUDENT IN AN ORGANIZED HEALTH CARE EDUCATION/TRAINING PROGRAM

## 2021-07-12 PROCEDURE — 80053 COMPREHEN METABOLIC PANEL: CPT

## 2021-07-12 PROCEDURE — 81003 URINALYSIS AUTO W/O SCOPE: CPT

## 2021-07-12 PROCEDURE — 84443 ASSAY THYROID STIM HORMONE: CPT

## 2021-07-12 PROCEDURE — 85025 COMPLETE CBC W/AUTO DIFF WBC: CPT

## 2021-07-12 PROCEDURE — 99284 EMERGENCY DEPT VISIT MOD MDM: CPT

## 2021-07-12 PROCEDURE — 83735 ASSAY OF MAGNESIUM: CPT

## 2021-07-12 PROCEDURE — 93005 ELECTROCARDIOGRAM TRACING: CPT | Performed by: EMERGENCY MEDICINE

## 2021-07-12 PROCEDURE — 81015 MICROSCOPIC EXAM OF URINE: CPT

## 2021-07-12 RX ORDER — SULFAMETHOXAZOLE AND TRIMETHOPRIM 800; 160 MG/1; MG/1
1 TABLET ORAL 2 TIMES DAILY
Qty: 14 TABLET | Refills: 0 | Status: SHIPPED | OUTPATIENT
Start: 2021-07-12 | End: 2021-07-19

## 2021-07-12 RX ORDER — CHLORHEXIDINE GLUCONATE 4 G/100ML
1 SOLUTION TOPICAL DAILY
Qty: 479 ML | Refills: 1 | Status: SHIPPED | OUTPATIENT
Start: 2021-07-12 | End: 2021-07-19

## 2021-07-12 NOTE — DISCHARGE INSTRUCTIONS
Return for worsening or concerning symptoms. Stop Keflex and start Bactrim. Soak in warm bath. Use warm compresses to affected areas. Results for orders placed or performed during the hospital encounter of 07/12/21   CBC WITH AUTOMATED DIFF   Result Value Ref Range    WBC 10.4 4.3 - 11.1 K/uL    RBC 5.01 4.05 - 5.2 M/uL    HGB 13.9 11.7 - 15.4 g/dL    HCT 43.9 35.8 - 46.3 %    MCV 87.6 79.6 - 97.8 FL    MCH 27.7 26.1 - 32.9 PG    MCHC 31.7 31.4 - 35.0 g/dL    RDW 14.0 11.9 - 14.6 %    PLATELET 911 155 - 528 K/uL    MPV 10.1 9.4 - 12.3 FL    ABSOLUTE NRBC 0.00 0.0 - 0.2 K/uL    DF AUTOMATED      NEUTROPHILS 83 (H) 43 - 78 %    LYMPHOCYTES 8 (L) 13 - 44 %    MONOCYTES 6 4.0 - 12.0 %    EOSINOPHILS 2 0.5 - 7.8 %    BASOPHILS 1 0.0 - 2.0 %    IMMATURE GRANULOCYTES 1 0.0 - 5.0 %    ABS. NEUTROPHILS 8.6 (H) 1.7 - 8.2 K/UL    ABS. LYMPHOCYTES 0.9 0.5 - 4.6 K/UL    ABS. MONOCYTES 0.6 0.1 - 1.3 K/UL    ABS. EOSINOPHILS 0.2 0.0 - 0.8 K/UL    ABS. BASOPHILS 0.1 0.0 - 0.2 K/UL    ABS. IMM. GRANS. 0.1 0.0 - 0.5 K/UL   METABOLIC PANEL, COMPREHENSIVE   Result Value Ref Range    Sodium 138 136 - 145 mmol/L    Potassium 3.9 3.5 - 5.1 mmol/L    Chloride 107 98 - 107 mmol/L    CO2 25 21 - 32 mmol/L    Anion gap 6 (L) 7 - 16 mmol/L    Glucose 148 (H) 65 - 100 mg/dL    BUN 6 6 - 23 MG/DL    Creatinine 0.60 0.6 - 1.0 MG/DL    GFR est AA >60 >60 ml/min/1.73m2    GFR est non-AA >60 >60 ml/min/1.73m2    Calcium 8.8 8.3 - 10.4 MG/DL    Bilirubin, total 0.3 0.2 - 1.1 MG/DL    ALT (SGPT) 13 12 - 65 U/L    AST (SGOT) 14 (L) 15 - 37 U/L    Alk.  phosphatase 106 50 - 136 U/L    Protein, total 9.2 (H) 6.3 - 8.2 g/dL    Albumin 3.4 (L) 3.5 - 5.0 g/dL    Globulin 5.8 (H) 2.3 - 3.5 g/dL    A-G Ratio 0.6 (L) 1.2 - 3.5     URINE MICROSCOPIC   Result Value Ref Range    WBC 0 0 /hpf    RBC 0-3 0 /hpf    Epithelial cells 0-3 0 /hpf    Bacteria 0 0 /hpf    Casts 0-3 0 /lpf   TSH 3RD GENERATION   Result Value Ref Range    TSH 3.090 0.358 - 3.740 uIU/mL MAGNESIUM   Result Value Ref Range    Magnesium 2.4 1.8 - 2.4 mg/dL   EKG, 12 LEAD, INITIAL   Result Value Ref Range    Ventricular Rate 111 BPM    Atrial Rate 111 BPM    P-R Interval 134 ms    QRS Duration 72 ms    Q-T Interval 324 ms    QTC Calculation (Bezet) 440 ms    Calculated P Axis 80 degrees    Calculated R Axis 75 degrees    Calculated T Axis 74 degrees    Diagnosis       Sinus tachycardia  Biatrial enlargement  Abnormal ECG  When compared with ECG of 06-DEC-2019 08:09,  No significant change was found

## 2021-07-12 NOTE — ED PROVIDER NOTES
43-year-old female with a history of psoriasis and recurrent folliculitis/abscesses presents with generalized weakness, lightheadedness, dizziness, and fatigue for the past week. It started 1 day after starting a homeopathic medication to \"clear my body of staph. \"  She also had an ingrown toenail removed recently and has been on Keflex for 2 days. She denies any fever, chills, cough, congestion, nausea, vomiting, abdominal pain, or urinary changes. She does have occasional loose stools, but no diarrhea. She admits to stopping the homeopathic medication, but feels no change in symptoms. No syncopal episodes. No headaches, focal numbness or weakness. She is currently on menses and states is heavier than normal.  Also reports draining abscess to her left groin and right axilla. Dizziness  Pertinent negatives include no shortness of breath, no chest pain, no vomiting, no confusion, no headaches and no nausea. Past Medical History:   Diagnosis Date    Arthritis     Psoriasis        Past Surgical History:   Procedure Laterality Date    HX GYN          HX ORTHOPAEDIC      cyst behind right knee         No family history on file.     Social History     Socioeconomic History    Marital status: SINGLE     Spouse name: Not on file    Number of children: Not on file    Years of education: Not on file    Highest education level: Not on file   Occupational History    Not on file   Tobacco Use    Smoking status: Current Every Day Smoker     Packs/day: 1.00    Smokeless tobacco: Never Used   Substance and Sexual Activity    Alcohol use: No    Drug use: No    Sexual activity: Not on file   Other Topics Concern    Not on file   Social History Narrative    Not on file     Social Determinants of Health     Financial Resource Strain:     Difficulty of Paying Living Expenses:    Food Insecurity:     Worried About Running Out of Food in the Last Year:     [address] in the Last Year: Transportation Needs:     Lack of Transportation (Medical):  Lack of Transportation (Non-Medical):    Physical Activity:     Days of Exercise per Week:     Minutes of Exercise per Session:    Stress:     Feeling of Stress :    Social Connections:     Frequency of Communication with Friends and Family:     Frequency of Social Gatherings with Friends and Family:     Attends [employer] Services:     Active Member of Clubs or Organizations:     Attends Club or Organization Meetings:     Marital Status:    Intimate Partner Violence:     Fear of Current or Ex-Partner:     Emotionally Abused:     Physically Abused:     Sexually Abused: ALLERGIES: Prednisone    Review of Systems   Constitutional: Positive for fatigue. Negative for fever. HENT: Negative for hearing loss. Eyes: Negative for visual disturbance. Respiratory: Negative for cough and shortness of breath. Cardiovascular: Negative for chest pain. Gastrointestinal: Negative for abdominal pain, diarrhea, nausea and vomiting. Genitourinary: Positive for vaginal bleeding. Negative for dysuria. Musculoskeletal: Positive for arthralgias. Negative for back pain. Skin: Positive for rash. Neurological: Positive for dizziness, weakness and light-headedness. Negative for headaches. Psychiatric/Behavioral: Negative for confusion. All other systems reviewed and are negative. Vitals:    07/12/21 1039 07/12/21 1056   BP: 129/86    Pulse: (!) 109 89   Resp: 20    Temp: 99.8 °F (37.7 °C)    SpO2: 98% 97%   Weight: 74.8 kg (165 lb)    Height: 5' 6\" (1.676 m)             Physical Exam  Vitals and nursing note reviewed. Constitutional:       Appearance: Normal appearance. She is well-developed. HENT:      Head: Normocephalic and atraumatic. Nose: Nose normal.      Mouth/Throat:      Mouth: Mucous membranes are moist.   Eyes:      Pupils: Pupils are equal, round, and reactive to light.    Cardiovascular:      Rate and Rhythm: Regular rhythm. Heart sounds: Normal heart sounds. Pulmonary:      Effort: Pulmonary effort is normal.      Breath sounds: Normal breath sounds. Abdominal:      Palpations: Abdomen is soft. Tenderness: There is no abdominal tenderness. Musculoskeletal:         General: No deformity. Normal range of motion. Cervical back: Normal range of motion and neck supple. Skin:     General: Skin is warm and dry. Findings: Rash present. Comments: Psoriatec plaques. Scattered areas of folliculitis. Abscess without fluctuance noted to right axilla   Neurological:      General: No focal deficit present. Mental Status: She is alert. Mental status is at baseline. Psychiatric:         Mood and Affect: Mood normal.         Behavior: Behavior normal.          MDM  Number of Diagnoses or Management Options  Diagnosis management comments: Parts of this document were created using dragon voice recognition software. The chart has been reviewed but errors may still be present. I wore appropriate PPE throughout this patient's ED visit. Linsey Martinez MD, 11:39 AM    1:57 PM  Work-up unremarkable. Advised to stop Keflex. Will prescribe Bactrim. I discussed the results of all labs, procedures, radiographs, and treatments with the patient and available family. Treatment plan is agreed upon and the patient is ready for discharge. Questions about treatment in the ED and differential diagnosis of presenting condition were answered. Patient was given verbal discharge instructions including, but not limited to, importance of returning to the emergency department for any concern of worsening or continued symptoms. Instructions were given to follow up with a primary care provider or specialist within 1-2 days.   Adverse effects of medications, if prescribed, were discussed and patient was advised to refrain from significant physical activity until followed up by primary care physician and to not drive or operate heavy machinery after taking any sedating substances. Amount and/or Complexity of Data Reviewed  Clinical lab tests: ordered and reviewed (Results for orders placed or performed during the hospital encounter of 07/12/21  -CBC WITH AUTOMATED DIFF       Result                      Value             Ref Range           WBC                         10.4              4.3 - 11.1 K*       RBC                         5.01              4.05 - 5.2 M*       HGB                         13.9              11.7 - 15.4 *       HCT                         43.9              35.8 - 46.3 %       MCV                         87.6              79.6 - 97.8 *       MCH                         27.7              26.1 - 32.9 *       MCHC                        31.7              31.4 - 35.0 *       RDW                         14.0              11.9 - 14.6 %       PLATELET                    395               150 - 450 K/*       MPV                         10.1              9.4 - 12.3 FL       ABSOLUTE NRBC               0.00              0.0 - 0.2 K/*       DF                          AUTOMATED                             NEUTROPHILS                 83 (H)            43 - 78 %           LYMPHOCYTES                 8 (L)             13 - 44 %           MONOCYTES                   6                 4.0 - 12.0 %        EOSINOPHILS                 2                 0.5 - 7.8 %         BASOPHILS                   1                 0.0 - 2.0 %         IMMATURE GRANULOCYTES       1                 0.0 - 5.0 %         ABS. NEUTROPHILS            8.6 (H)           1.7 - 8.2 K/*       ABS. LYMPHOCYTES            0.9               0.5 - 4.6 K/*       ABS. MONOCYTES              0.6               0.1 - 1.3 K/*       ABS. EOSINOPHILS            0.2               0.0 - 0.8 K/*       ABS. BASOPHILS              0.1               0.0 - 0.2 K/*       ABS. IMM.  GRANS.            0.1               0.0 - 0.5 K/*  -METABOLIC PANEL, COMPREHENSIVE       Result                      Value             Ref Range           Sodium                      138               136 - 145 mm*       Potassium                   3.9               3.5 - 5.1 mm*       Chloride                    107               98 - 107 mmo*       CO2                         25                21 - 32 mmol*       Anion gap                   6 (L)             7 - 16 mmol/L       Glucose                     148 (H)           65 - 100 mg/*       BUN                         6                 6 - 23 MG/DL        Creatinine                  0.60              0.6 - 1.0 MG*       GFR est AA                  >60               >60 ml/min/1*       GFR est non-AA              >60               >60 ml/min/1*       Calcium                     8.8               8.3 - 10.4 M*       Bilirubin, total            0.3               0.2 - 1.1 MG*       ALT (SGPT)                  13                12 - 65 U/L         AST (SGOT)                  14 (L)            15 - 37 U/L         Alk.  phosphatase            106               50 - 136 U/L        Protein, total              9.2 (H)           6.3 - 8.2 g/*       Albumin                     3.4 (L)           3.5 - 5.0 g/*       Globulin                    5.8 (H)           2.3 - 3.5 g/*       A-G Ratio                   0.6 (L)           1.2 - 3.5      -URINE MICROSCOPIC       Result                      Value             Ref Range           WBC                         0                 0 /hpf              RBC                         0-3               0 /hpf              Epithelial cells            0-3               0 /hpf              Bacteria                    0                 0 /hpf              Casts                       0-3               0 /lpf         -TSH 3RD GENERATION       Result                      Value             Ref Range           TSH                         3.090             0.358 - 3.74*  -MAGNESIUM       Result Value             Ref Range           Magnesium                   2.4               1.8 - 2.4 mg*  -EKG, 12 LEAD, INITIAL       Result                      Value             Ref Range           Ventricular Rate            111               BPM                 Atrial Rate                 111               BPM                 P-R Interval                134               ms                  QRS Duration                72                ms                  Q-T Interval                324               ms                  QTC Calculation (Bezet)     440               ms                  Calculated P Axis           80                degrees             Calculated R Axis           75                degrees             Calculated T Axis           74                degrees             Diagnosis                                                     Sinus tachycardia   Biatrial enlargement   Abnormal ECG   When compared with ECG of 06-DEC-2019 08:09,   No significant change was found     )           EKG    Date/Time: 7/12/2021 2:00 PM  Performed by: Sidney Cheung MD  Authorized by: Sidney Cheung MD     ECG reviewed by ED Physician in the absence of a cardiologist: yes    Interpretation:     Interpretation: abnormal    Rate:     ECG rate:  111    ECG rate assessment: tachycardic    Rhythm:     Rhythm: sinus rhythm    Ectopy:     Ectopy: none    Conduction:     Conduction: normal    ST segments:     ST segments:  Normal  T waves:     T waves: normal

## 2021-07-12 NOTE — ED TRIAGE NOTES
Pt arrives via POV c/o constant dizziness and anxiety. Pt denies any unilateral numbness or tingling. Pt tearful randomly during conversation. Hx of arthritis. Reports on menstrual cycle and \"worse than normal\". Presents with boils to the right arm and left hip area. Pt on abx after a toenail removal. Denies any chest discomfort.

## 2021-09-18 NOTE — ED NOTES
Attempt to discharge patient. Patient nor her belongings are in her room. Unable to locate patient in the department. Patient is in the supine position.   Pt intubated and vented

## 2022-01-15 ENCOUNTER — HOSPITAL ENCOUNTER (EMERGENCY)
Age: 42
Discharge: HOME | End: 2022-01-16
Attending: EMERGENCY MEDICINE

## 2022-01-15 ENCOUNTER — APPOINTMENT (OUTPATIENT)
Dept: GENERAL RADIOLOGY | Age: 42
End: 2022-01-15
Attending: EMERGENCY MEDICINE

## 2022-01-15 VITALS
HEIGHT: 66 IN | HEART RATE: 113 BPM | SYSTOLIC BLOOD PRESSURE: 143 MMHG | DIASTOLIC BLOOD PRESSURE: 93 MMHG | RESPIRATION RATE: 20 BRPM | BODY MASS INDEX: 24.11 KG/M2 | TEMPERATURE: 99.1 F | OXYGEN SATURATION: 98 % | WEIGHT: 150 LBS

## 2022-01-15 DIAGNOSIS — R07.9 CHEST PAIN, UNSPECIFIED TYPE: ICD-10-CM

## 2022-01-15 DIAGNOSIS — J42 CHRONIC BRONCHITIS, UNSPECIFIED CHRONIC BRONCHITIS TYPE (HCC): Primary | ICD-10-CM

## 2022-01-15 LAB
ALBUMIN SERPL-MCNC: 3 G/DL (ref 3.5–5)
ALBUMIN/GLOB SERPL: 0.6 {RATIO} (ref 1.2–3.5)
ALP SERPL-CCNC: 110 U/L (ref 50–136)
ALT SERPL-CCNC: 14 U/L (ref 12–65)
ANION GAP SERPL CALC-SCNC: 6 MMOL/L (ref 7–16)
AST SERPL-CCNC: 14 U/L (ref 15–37)
BASOPHILS # BLD: 0.1 K/UL (ref 0–0.2)
BASOPHILS NFR BLD: 1 % (ref 0–2)
BILIRUB SERPL-MCNC: 0.1 MG/DL (ref 0.2–1.1)
BUN SERPL-MCNC: 9 MG/DL (ref 6–23)
CALCIUM SERPL-MCNC: 8.8 MG/DL (ref 8.3–10.4)
CHLORIDE SERPL-SCNC: 107 MMOL/L (ref 98–107)
CO2 SERPL-SCNC: 25 MMOL/L (ref 21–32)
CREAT SERPL-MCNC: 0.63 MG/DL (ref 0.6–1)
DIFFERENTIAL METHOD BLD: NORMAL
EOSINOPHIL # BLD: 0.3 K/UL (ref 0–0.8)
EOSINOPHIL NFR BLD: 3 % (ref 0.5–7.8)
ERYTHROCYTE [DISTWIDTH] IN BLOOD BY AUTOMATED COUNT: 13.9 % (ref 11.9–14.6)
GLOBULIN SER CALC-MCNC: 5.3 G/DL (ref 2.3–3.5)
GLUCOSE SERPL-MCNC: 100 MG/DL (ref 65–100)
HCT VFR BLD AUTO: 41.1 % (ref 35.8–46.3)
HGB BLD-MCNC: 13 G/DL (ref 11.7–15.4)
IMM GRANULOCYTES # BLD AUTO: 0.1 K/UL (ref 0–0.5)
IMM GRANULOCYTES NFR BLD AUTO: 1 % (ref 0–5)
LIPASE SERPL-CCNC: 119 U/L (ref 73–393)
LYMPHOCYTES # BLD: 1.5 K/UL (ref 0.5–4.6)
LYMPHOCYTES NFR BLD: 14 % (ref 13–44)
MAGNESIUM SERPL-MCNC: 3.1 MG/DL (ref 1.8–2.4)
MCH RBC QN AUTO: 28.8 PG (ref 26.1–32.9)
MCHC RBC AUTO-ENTMCNC: 31.6 G/DL (ref 31.4–35)
MCV RBC AUTO: 90.9 FL (ref 79.6–97.8)
MONOCYTES # BLD: 0.8 K/UL (ref 0.1–1.3)
MONOCYTES NFR BLD: 8 % (ref 4–12)
NEUTS SEG # BLD: 7.7 K/UL (ref 1.7–8.2)
NEUTS SEG NFR BLD: 74 % (ref 43–78)
NRBC # BLD: 0 K/UL (ref 0–0.2)
PLATELET # BLD AUTO: 387 K/UL (ref 150–450)
PMV BLD AUTO: 10.2 FL (ref 9.4–12.3)
POTASSIUM SERPL-SCNC: 3.6 MMOL/L (ref 3.5–5.1)
PROT SERPL-MCNC: 8.3 G/DL (ref 6.3–8.2)
RBC # BLD AUTO: 4.52 M/UL (ref 4.05–5.2)
SODIUM SERPL-SCNC: 138 MMOL/L (ref 136–145)
TROPONIN-HIGH SENSITIVITY: 6.2 PG/ML (ref 0–14)
TROPONIN-HIGH SENSITIVITY: 6.4 PG/ML (ref 0–14)
WBC # BLD AUTO: 10.4 K/UL (ref 4.3–11.1)

## 2022-01-15 PROCEDURE — 93005 ELECTROCARDIOGRAM TRACING: CPT | Performed by: EMERGENCY MEDICINE

## 2022-01-15 PROCEDURE — 83735 ASSAY OF MAGNESIUM: CPT

## 2022-01-15 PROCEDURE — 71046 X-RAY EXAM CHEST 2 VIEWS: CPT

## 2022-01-15 PROCEDURE — 83690 ASSAY OF LIPASE: CPT

## 2022-01-15 PROCEDURE — 84484 ASSAY OF TROPONIN QUANT: CPT

## 2022-01-15 PROCEDURE — 99283 EMERGENCY DEPT VISIT LOW MDM: CPT

## 2022-01-15 PROCEDURE — 80053 COMPREHEN METABOLIC PANEL: CPT

## 2022-01-15 PROCEDURE — 85025 COMPLETE CBC W/AUTO DIFF WBC: CPT

## 2022-01-15 RX ORDER — BENZONATATE 100 MG/1
200 CAPSULE ORAL
Qty: 30 CAPSULE | Refills: 1 | Status: SHIPPED | OUTPATIENT
Start: 2022-01-15 | End: 2022-01-22

## 2022-01-15 RX ORDER — SODIUM CHLORIDE 0.9 % (FLUSH) 0.9 %
5-10 SYRINGE (ML) INJECTION EVERY 8 HOURS
Status: DISCONTINUED | OUTPATIENT
Start: 2022-01-15 | End: 2022-01-16 | Stop reason: HOSPADM

## 2022-01-15 RX ORDER — SODIUM CHLORIDE 0.9 % (FLUSH) 0.9 %
5-10 SYRINGE (ML) INJECTION AS NEEDED
Status: DISCONTINUED | OUTPATIENT
Start: 2022-01-15 | End: 2022-01-16 | Stop reason: HOSPADM

## 2022-01-16 LAB
ATRIAL RATE: 109 BPM
CALCULATED P AXIS, ECG09: 62 DEGREES
CALCULATED R AXIS, ECG10: 50 DEGREES
CALCULATED T AXIS, ECG11: 54 DEGREES
DIAGNOSIS, 93000: NORMAL
P-R INTERVAL, ECG05: 136 MS
Q-T INTERVAL, ECG07: 322 MS
QRS DURATION, ECG06: 72 MS
QTC CALCULATION (BEZET), ECG08: 433 MS
VENTRICULAR RATE, ECG03: 109 BPM

## 2022-01-16 NOTE — DISCHARGE INSTRUCTIONS
Follow-up with the [facility].  Return to the emergency department if your symptoms worsen

## 2022-01-16 NOTE — ED PROVIDER NOTES
Patient is a 67-year-old female with a history of asthma and psoriasis who presents with a cough for the past 2 weeks. She describes it as a dry cough seemingly worse at night. Over the past several days she has been having some chest pain associated with her cough. She describes as left-sided achy nonradiating pain when she coughs or palpates her chest wall. It is in her left parasternal area. She denies any trauma or obvious precipitating event. She denies any fever. She has been tested for COVID twice over the past couple weeks, was negative. Past Medical History:   Diagnosis Date    Arthritis     Psoriasis        Past Surgical History:   Procedure Laterality Date    HX GYN          HX ORTHOPAEDIC      cyst behind right knee         No family history on file. Social History     Socioeconomic History    Marital status: SINGLE     Spouse name: Not on file    Number of children: Not on file    Years of education: Not on file    Highest education level: Not on file   Occupational History    Not on file   Tobacco Use    Smoking status: Current Every Day Smoker     Packs/day: 1.00    Smokeless tobacco: Never Used   Substance and Sexual Activity    Alcohol use: No    Drug use: No    Sexual activity: Not on file   Other Topics Concern    Not on file   Social History Narrative    Not on file     Social Determinants of Health     Financial Resource Strain:     Difficulty of Paying Living Expenses: Not on file   Food Insecurity:     Worried About Running Out of Food in the Last Year: Not on file    Wang of Food in the Last Year: Not on file   Transportation Needs:     Lack of Transportation (Medical): Not on file    Lack of Transportation (Non-Medical):  Not on file   Physical Activity:     Days of Exercise per Week: Not on file    Minutes of Exercise per Session: Not on file   Stress:     Feeling of Stress : Not on file   Social Connections:     Frequency of Communication with Friends and Family: Not on file    Frequency of Social Gatherings with Friends and Family: Not on file    Attends [employer] Services: Not on file    Active Member of Clubs or Organizations: Not on file    Attends Club or Organization Meetings: Not on file    Marital Status: Not on file   Intimate Partner Violence:     Fear of Current or Ex-Partner: Not on file    Emotionally Abused: Not on file    Physically Abused: Not on file    Sexually Abused: Not on file   Housing Stability:     Unable to Pay for Housing in the Last Year: Not on file    Number of [place] in the Last Year: Not on file    Unstable Housing in the Last Year: Not on file         ALLERGIES: Prednisone    Review of Systems   Constitutional: Negative for chills and fever. Gastrointestinal: Negative for nausea and vomiting. All other systems reviewed and are negative. Vitals:    01/15/22 2032 01/15/22 2202   BP: (!) 161/102 (!) 143/93   Pulse: (!) 113    Resp: 20    Temp: 99.1 °F (37.3 °C)    SpO2: 100% 98%   Weight: 68 kg (150 lb)    Height: 5' 6\" (1.676 m)             Physical Exam  Vitals and nursing note reviewed. Constitutional:       Appearance: Normal appearance. She is well-developed. HENT:      Head: Normocephalic and atraumatic. Eyes:      Conjunctiva/sclera: Conjunctivae normal.      Pupils: Pupils are equal, round, and reactive to light. Cardiovascular:      Rate and Rhythm: Normal rate and regular rhythm. Pulmonary:      Effort: Pulmonary effort is normal. No respiratory distress. Breath sounds: No wheezing. Chest:      Chest wall: No tenderness. Musculoskeletal:         General: No tenderness. Cervical back: Normal range of motion and neck supple. Right lower leg: No edema. Left lower leg: No edema. Skin:     General: Skin is warm and dry. Neurological:      Mental Status: She is alert and oriented to person, place, and time.    Psychiatric:         Behavior: Behavior normal.          MDM  Number of Diagnoses or Management Options  Chest pain, unspecified type: new and does not require workup  Chronic bronchitis, unspecified chronic bronchitis type ([address]): new and does not require workup  Diagnosis management comments: 11:39 PM discussed results with the patient, unremarkable work-up.  HEART score 0       Amount and/or Complexity of Data Reviewed  Clinical lab tests: ordered and reviewed  Tests in the radiology section of CPT®: ordered and reviewed  Tests in the medicine section of CPT®: reviewed and ordered    Risk of Complications, Morbidity, and/or Mortality  Presenting problems: moderate  Diagnostic procedures: moderate  Management options: moderate    Patient Progress  Patient progress: stable         EKG    Date/Time: 1/15/2022 11:40 PM  Performed by: Lacey Darby MD  Authorized by: Lacey Darby MD     ECG reviewed by ED Physician in the absence of a cardiologist: yes    Previous ECG:     Previous ECG:  Unavailable  Interpretation:     Interpretation: normal    Rate:     ECG rate:  109    ECG rate assessment: normal    Rhythm:     Rhythm: sinus tachycardia    Ectopy:     Ectopy: none    QRS:     QRS axis:  Normal    QRS intervals:  Normal  Conduction:     Conduction: normal    ST segments:     ST segments:  Normal  T waves:     T waves: normal

## 2022-01-16 NOTE — ED TRIAGE NOTES
About one week of dull left sided chest pain, worsened by cough. Productive cough (phlem) for 2 weeks with a negative COVID test at that time. Denies fevers, chills or SOB.

## 2022-01-16 NOTE — ED NOTES
I have reviewed discharge instructions with the patient. The patient verbalized understanding. Patient left ED via Discharge Method: ambulatory to Home  Opportunity for questions and clarification provided. Patient given 1 scripts. Pt declined parting vitals, preferring to hurry home before arrival of blizzard. To continue your aftercare when you leave the hospital, you may receive an automated call from our care team to check in on how you are doing.  This is a free service and part of our promise to provide the best care and service to meet your aftercare needs. \" If you have questions, or wish to unsubscribe from this service please call [phone].  Thank you for Choosing our [facility] Emergency Department.

## 2022-02-16 ENCOUNTER — APPOINTMENT (OUTPATIENT)
Dept: GENERAL RADIOLOGY | Age: 42
End: 2022-02-16
Attending: EMERGENCY MEDICINE

## 2022-02-16 ENCOUNTER — HOSPITAL ENCOUNTER (EMERGENCY)
Age: 42
Discharge: LEFT BEFORE DISCHARGE | End: 2022-02-16
Attending: EMERGENCY MEDICINE

## 2022-02-16 VITALS
OXYGEN SATURATION: 96 % | WEIGHT: 160 LBS | DIASTOLIC BLOOD PRESSURE: 74 MMHG | RESPIRATION RATE: 18 BRPM | TEMPERATURE: 98.4 F | HEIGHT: 65 IN | HEART RATE: 98 BPM | BODY MASS INDEX: 26.66 KG/M2 | SYSTOLIC BLOOD PRESSURE: 103 MMHG

## 2022-02-16 DIAGNOSIS — M25.532 LEFT WRIST PAIN: Primary | ICD-10-CM

## 2022-02-16 DIAGNOSIS — M25.532 WRIST PAIN, ACUTE, LEFT: ICD-10-CM

## 2022-02-16 PROCEDURE — 99281 EMR DPT VST MAYX REQ PHY/QHP: CPT

## 2022-02-16 PROCEDURE — 73110 X-RAY EXAM OF WRIST: CPT

## 2022-02-16 RX ORDER — MELOXICAM 7.5 MG/1
7.5 TABLET ORAL DAILY
Status: DISCONTINUED | OUTPATIENT
Start: 2022-02-16 | End: 2022-02-16 | Stop reason: HOSPADM

## 2022-02-16 RX ORDER — ACETAMINOPHEN 500 MG
1000 TABLET ORAL
Status: DISCONTINUED | OUTPATIENT
Start: 2022-02-16 | End: 2022-02-16 | Stop reason: HOSPADM

## 2022-02-16 NOTE — ED TRIAGE NOTES
Pt arrives ambulatory. Pt states left wrist is swollen and painful radiates up to left arm. states was diagnosed w/arthritis 3mo ago. Denies injury.

## 2022-02-16 NOTE — DISCHARGE INSTRUCTIONS
Return to the ED immediately for any new, worsening, or concerning symptoms, or danger signs as we discussed. Otherwise, follow up with your PCP in 1-2 days for reevaluation. You have declined prescription for for steroids, other therapeutic measures in the ED today. Please return to the ED immediately if you have new or worsening symptoms, want to resume work-up or recommended management.   Otherwise follow-up with rheumatology and orthopedics at provided contact number

## 2022-02-16 NOTE — ED PROVIDER NOTES
HPI   49-year-old female with history significant for psoriatic arthritis, presents to the ED with complaint of left wrist pain. Patient states that she has near daily pain at the left wrist for 3-4 months. States that at times she has pain in the left elbow and shoulder, but not at present. Pain is made worse with palpation, range of motion, nothing known to significantly improved. Took ibuprofen last night, no other known therapeutic measures. Patient states that she has been seen a number of times for this complaint at the [facility] clinic and was told that pain is related to arthritis. He voices frustration because \"they won't give me a plan. \"  Patient denies injury, new activity. Denies repetitive use, not currently working. Reports ongoing symptoms for a number of months. Denies headaches, chest pain or tightness, back pain, cough, hemoptysis, difficulty breathing, MACKEY, unilateral leg or arm swelling. Denies fevers or chills, puncture wound, rash. Denies history of PE or DVT. Denies history of cancer, denies pregnancy. Denies recent travel, surgery, exogenous hormone use, recent trauma, immobilization. Patient is pleasant well-appearing, hemodynamically stable and afebrile. She is nontoxic-appearing and appears no distress. Patient is not tachycardic, tachypneic, hypoxic or febrile. Past Medical History:   Diagnosis Date    Arthritis     Psoriasis        Past Surgical History:   Procedure Laterality Date    HX GYN          HX ORTHOPAEDIC      cyst behind right knee         No family history on file.     Social History     Socioeconomic History    Marital status: SINGLE     Spouse name: Not on file    Number of children: Not on file    Years of education: Not on file    Highest education level: Not on file   Occupational History    Not on file   Tobacco Use    Smoking status: Current Every Day Smoker     Packs/day: 1.00    Smokeless tobacco: Never Used   Substance and Sexual Activity    Alcohol use: No    Drug use: No    Sexual activity: Not on file   Other Topics Concern    Not on file   Social History Narrative    Not on file     Social Determinants of Health     Financial Resource Strain:     Difficulty of Paying Living Expenses: Not on file   Food Insecurity:     Worried About Running Out of Food in the Last Year: Not on file    Wang of Food in the Last Year: Not on file   Transportation Needs:     Lack of Transportation (Medical): Not on file    Lack of Transportation (Non-Medical): Not on file   Physical Activity:     Days of Exercise per Week: Not on file    Minutes of Exercise per Session: Not on file   Stress:     Feeling of Stress : Not on file   Social Connections:     Frequency of Communication with Friends and Family: Not on file    Frequency of Social Gatherings with Friends and Family: Not on file    Attends Jew Services: Not on file    Active Member of [employer] or Organizations: Not on file    Attends Club or Organization Meetings: Not on file    Marital Status: Not on file   Intimate Partner Violence:     Fear of Current or Ex-Partner: Not on file    Emotionally Abused: Not on file    Physically Abused: Not on file    Sexually Abused: Not on file   Housing Stability:     Unable to Pay for Housing in the Last Year: Not on file    Number of [place] in the Last Year: Not on file    Unstable Housing in the Last Year: Not on file         ALLERGIES: Prednisone    Review of Systems  Constitutional: Negative for fever. Negative for appetite change, chills, diaphoresis and unexpected weight change.     HENT: Negative     Eyes: Negative   Respiratory: Negative  Cardiovascular: Negative  Musculoskeletal: As in HPI  Skin: Negative     Allergic/Immunologic: Negative  Neurological: Negative                          Vitals:    02/16/22 0834   BP: 103/74   Pulse: 98   Resp: 18   Temp: 98.4 °F (36.9 °C)   SpO2: 96%   Weight: 72.6 kg (160 lb) Height: 5' 5\" (1.651 m)            Physical Exam   Constitutional: Oriented to person, place, and time. Appears well-developed and well-nourished. No distress. HENT:    Head: Normocephalic and atraumatic   Right Ear: External ear normal.    Left Ear: External ear normal.     Nose: Nose normal.   Mouth/Throat: Mouth normal.    Eyes: Conjunctivae are normal.   Neck: Supple. No tracheal deviation. Cardiovascular: Normal rate, intact distal pulses. Brisk capillary refill intact, less than 2 seconds. Regular rhythm present. No pitting edema. Pulmonary/Chest: Lungs are clear & equal bilaterally. No adventitious sounds. No respiratory distress. Abdominal: Soft. There is no tenderness. Musculoskeletal: Full active range of motion intact of the left hand, fingers and wrists, but with pain with active flexion extension of the wrist.  TTP at anatomic snuffbox, no other hand or wrist tenderness. No tenderness of the forearm, elbow, upper arm or left shoulder. Has full active range of motion of the elbow and shoulder, with no pain, no pain with passive range of motion. No other tenderness no obvious deformity, erythema, edema. Intact distal pulses, normal color and temperature, no rash, cap refill less than 2 seconds. No sensory deficits. No palsy. Neurological: Alert and oriented to person, place, and time. No numbness/tingling. No loss of sensation. Positive PMS ×4. GCS= 15. Skin: Skin is warm and dry. Capillary refill takes less than 2 seconds. No abrasion, no lesion, no petechiae and no rash noted. Not diaphoretic. No cyanosis, erythema, or pallor. Psychiatric: Normal mood and affect. Behavior is normal.    Nursing note and vitals reviewed. MDM   40-year-old female in the ED with left wrist pain x3-4 months. Reports wrist pain increased last night and is uncertain why. As in HPI. No deformity bruising, wounds suggestive of new significant injury.   No increased warmth, erythema, rash, lesion, increased warmth, or notable swelling suggestive of current infectious process. Sensation intact, range of motion intact. Patient with no neck or back pain, no neck or back tenderness or decreased range of motion, pain with range of motion suggestive of spinal etiology. No chest pain or difficulty breathing and denies any recent similar symptoms. Low suspicion of ACS, DVT/PE, septic joint, gout flare, gonococcal arthritis, or other acute emergent process. Per review of patient's electronic medical record, she reports history of psoriatic arthritis, had rheumatologic markers obtained in fall 2021, was advised to follow-up with rheumatologist in October 2021 at ED visit. Reports that she previously was following with rheumatology while living in [place], has not established care with rheumatologist in this area. Declines corticosteroids. No recent illness, injury or activity change, no fevers or chills. Denies medication allergies. Reports history of psoriatic arthritis. Denies history of gout, other. Analgesia provided, x-ray of the left wrist with no acute process. Osteopenia noted skin remains warm dry, no significant swelling, no erythema or increased warmth. No rash or lesion, no pitting edema, no unilateral swelling. No leg pain or tenderness, no chest pain or tightness, difficulty breathing, neck or back pain, numbness/tingling/weakness. Declines work offer of stomach steroid course, further work-up in the ED. Sideration given towards rheumatologic process, also traumatic injury not characterized on imaging. Immobilized with thumb spica and provided contact number for [facility] as well as [facility]ology. Advised to call today to facilitate earliest possible follow-up if these groups, to return to the ED for new, worsening, concerning symptoms, danger signs as we have discussed were to continue management. Patient is well-hydrated appearing, no distress. Nontoxic-appearing, tolerating oral intake, hemodynamically stable. All findings and plan were discussed with the patient. All questions answered. Discussed with the patient that an unremarkable evaluation in the ED does not preclude the development or presence of a serious or life threatening condition. Patient was instructed to return immediately for any worsening or change in current symptoms, or if symptoms do not continue to improve. I instructed them to follow up with their primary care provider, own specialist, or medical provider that I am recommending for him within the next 2-3 days  The patient acknowledged understanding plan of care and affirmed approval.       [phone]: Informed by ED RN that patient left the ED prior to receiving thumb spica and placement, prior to receiving discharge instructions and follow-up contact information    Signed by: BHAVANA Lorenz     This note created using Dragon voice recognition software. Please excuse any accidental errors associated with its use, as note has not been fully proofread and edited.           Procedures

## 2022-02-16 NOTE — ED NOTES
Late D/C due to pharmacy delay in sending Mobic. Have called pharmacy no answer. Sent message at [phone]. Called to [facility] in pharmacy.  Sending now [phone]

## 2022-02-20 ENCOUNTER — HOSPITAL ENCOUNTER (EMERGENCY)
Age: 42
Discharge: HOME | End: 2022-02-20
Attending: EMERGENCY MEDICINE

## 2022-02-20 VITALS
HEIGHT: 65 IN | BODY MASS INDEX: 26.66 KG/M2 | TEMPERATURE: 98.2 F | RESPIRATION RATE: 16 BRPM | DIASTOLIC BLOOD PRESSURE: 89 MMHG | WEIGHT: 160 LBS | OXYGEN SATURATION: 93 % | SYSTOLIC BLOOD PRESSURE: 125 MMHG | HEART RATE: 90 BPM

## 2022-02-20 DIAGNOSIS — N39.0 URINARY TRACT INFECTION WITHOUT HEMATURIA, SITE UNSPECIFIED: Primary | ICD-10-CM

## 2022-02-20 LAB
APPEARANCE UR: ABNORMAL
BACTERIA URNS QL MICRO: ABNORMAL /HPF
BILIRUB UR QL: NEGATIVE
COLOR UR: YELLOW
EPI CELLS #/AREA URNS HPF: ABNORMAL /HPF
GLUCOSE UR STRIP.AUTO-MCNC: NEGATIVE MG/DL
HGB UR QL STRIP: ABNORMAL
KETONES UR QL STRIP.AUTO: NEGATIVE MG/DL
LEUKOCYTE ESTERASE UR QL STRIP.AUTO: ABNORMAL
NITRITE UR QL STRIP.AUTO: NEGATIVE
OTHER OBSERVATIONS,UCOM: ABNORMAL
PH UR STRIP: 6 [PH] (ref 5–9)
PROT UR STRIP-MCNC: 100 MG/DL
RBC #/AREA URNS HPF: >100 /HPF
SERVICE CMNT-IMP: NORMAL
SP GR UR REFRACTOMETRY: 1.01 (ref 1–1.02)
UROBILINOGEN UR QL STRIP.AUTO: 0.2 EU/DL (ref 0.2–1)
WBC URNS QL MICRO: >100 /HPF
WET PREP GENITAL: NORMAL
WET PREP GENITAL: NORMAL

## 2022-02-20 PROCEDURE — 74011000250 HC RX REV CODE- 250: Performed by: PHYSICIAN ASSISTANT

## 2022-02-20 PROCEDURE — 81001 URINALYSIS AUTO W/SCOPE: CPT

## 2022-02-20 PROCEDURE — 87491 CHLMYD TRACH DNA AMP PROBE: CPT

## 2022-02-20 PROCEDURE — 99283 EMERGENCY DEPT VISIT LOW MDM: CPT

## 2022-02-20 PROCEDURE — 87210 SMEAR WET MOUNT SALINE/INK: CPT

## 2022-02-20 PROCEDURE — 74011250636 HC RX REV CODE- 250/636: Performed by: PHYSICIAN ASSISTANT

## 2022-02-20 PROCEDURE — 74011250637 HC RX REV CODE- 250/637: Performed by: PHYSICIAN ASSISTANT

## 2022-02-20 PROCEDURE — 96372 THER/PROPH/DIAG INJ SC/IM: CPT

## 2022-02-20 RX ORDER — CEPHALEXIN 500 MG/1
500 CAPSULE ORAL 3 TIMES DAILY
Qty: 21 CAPSULE | Refills: 0 | Status: SHIPPED | OUTPATIENT
Start: 2022-02-20 | End: 2022-02-27

## 2022-02-20 RX ORDER — AZITHROMYCIN 250 MG/1
1000 TABLET, FILM COATED ORAL
Status: COMPLETED | OUTPATIENT
Start: 2022-02-20 | End: 2022-02-20

## 2022-02-20 RX ADMIN — LIDOCAINE HYDROCHLORIDE 500 MG: 10 INJECTION, SOLUTION INFILTRATION; PERINEURAL at 22:36

## 2022-02-20 RX ADMIN — AZITHROMYCIN MONOHYDRATE 1000 MG: 250 TABLET ORAL at 22:36

## 2022-02-21 NOTE — ED PROVIDER NOTES
Patient complains of urinary symptoms foul odor the past 2 to 3 days. She also admits to unprotected sex and concerned about any possible STDs she denies any obvious vaginal discharge    The history is provided by the patient. Urinary Pain  This is a new problem. The current episode started 2 days ago. The problem occurs constantly. The problem has been gradually worsening. Nothing aggravates the symptoms. Nothing relieves the symptoms. She has tried nothing for the symptoms. The treatment provided no relief. Vaginal Discharge         Past Medical History:   Diagnosis Date    Arthritis     Psoriasis        Past Surgical History:   Procedure Laterality Date    HX GYN          HX ORTHOPAEDIC      cyst behind right knee         No family history on file. Social History     Socioeconomic History    Marital status: SINGLE     Spouse name: Not on file    Number of children: Not on file    Years of education: Not on file    Highest education level: Not on file   Occupational History    Not on file   Tobacco Use    Smoking status: Current Every Day Smoker     Packs/day: 1.00    Smokeless tobacco: Never Used   Substance and Sexual Activity    Alcohol use: No    Drug use: No    Sexual activity: Not on file   Other Topics Concern    Not on file   Social History Narrative    Not on file     Social Determinants of Health     Financial Resource Strain:     Difficulty of Paying Living Expenses: Not on file   Food Insecurity:     Worried About Running Out of Food in the Last Year: Not on file    Wang of Food in the Last Year: Not on file   Transportation Needs:     Lack of Transportation (Medical): Not on file    Lack of Transportation (Non-Medical):  Not on file   Physical Activity:     Days of Exercise per Week: Not on file    Minutes of Exercise per Session: Not on file   Stress:     Feeling of Stress : Not on file   Social Connections:     Frequency of Communication with Friends and Family: Not on file    Frequency of Social Gatherings with Friends and Family: Not on file    Attends [employer] Services: Not on file    Active Member of Clubs or Organizations: Not on file    Attends Club or Organization Meetings: Not on file    Marital Status: Not on file   Intimate Partner Violence:     Fear of Current or Ex-Partner: Not on file    Emotionally Abused: Not on file    Physically Abused: Not on file    Sexually Abused: Not on file   Housing Stability:     Unable to Pay for Housing in the Last Year: Not on file    Number of [place] in the Last Year: Not on file    Unstable Housing in the Last Year: Not on file         ALLERGIES: Prednisone    Review of Systems   Genitourinary: Positive for vaginal discharge. All other systems reviewed and are negative. Vitals:    02/20/22 2005   BP: (!) 133/98   Pulse: (!) 110   Resp: 18   Temp: 98.6 °F (37 °C)   SpO2: 98%   Weight: 72.6 kg (160 lb)   Height: 5' 5\" (1.651 m)            Physical Exam  Vitals and nursing note reviewed. Constitutional:       General: She is not in acute distress. Appearance: Normal appearance. She is well-developed and normal weight. She is not diaphoretic. Comments: Anxious   HENT:      Head: Normocephalic and atraumatic. Right Ear: External ear normal.      Left Ear: External ear normal.   Eyes:      Pupils: Pupils are equal, round, and reactive to light. Cardiovascular:      Rate and Rhythm: Normal rate and regular rhythm. Pulmonary:      Effort: Pulmonary effort is normal.      Breath sounds: Normal breath sounds. Abdominal:      General: Abdomen is flat. Bowel sounds are normal.      Palpations: Abdomen is soft. Tenderness: There is no abdominal tenderness. Genitourinary:     Comments: Patient with psoriasis, skin changes to mons area consistent and chronic.   Very small amount and clear discharge noted culture were collected, no cervical motion tenderness no adnexal masses  Musculoskeletal:         General: Normal range of motion. Cervical back: Normal range of motion and neck supple. Skin:     General: Skin is warm. Neurological:      General: No focal deficit present. Mental Status: She is alert and oriented to person, place, and time. Psychiatric:         Thought Content:  Thought content normal.          MDM  Number of Diagnoses or Management Options  Diagnosis management comments: Labs Reviewed  URINALYSIS W/ RFLX MICROSCOPIC - Abnormal; Notable for the following components:     Protein                       100 (*)                Blood                         LARGE (*)               Leukocyte Esterase            MODERATE (*)            All other components within normal limits  WET PREP  CHLAMYDIA / GC-AMPLIFIED    UTI, fear of STD, treatment given in ER stressed to avoid any unprotected sex will call with any positive test results follow-up with primary care physician for routine recheck       Amount and/or Complexity of Data Reviewed  Clinical lab tests: ordered and reviewed  Review and summarize past medical records: yes    Risk of Complications, Morbidity, and/or Mortality  Presenting problems: low  Diagnostic procedures: low  Management options: low    Patient Progress  Patient progress: improved         Procedures

## 2022-02-21 NOTE — ED TRIAGE NOTES
Pt ambulatory to triage wearing a mask. Pt c/o frequent urination and \"there's a strange odor down there. \" Pt reports recent unprotected sex with \"someone who I found out wasn't quite trustworthy after the fact. \" Pt states she has a Hx of strep B and MRSA.  Pt states, \"I have had to have urine cultures before because I've had Strep B and MRSA in the past.\"

## 2022-02-21 NOTE — DISCHARGE INSTRUCTIONS
Use meds as directed, see your primary care physician for routine recheck, we will call with any positive test results or you may also check your [employer] tyler for results

## 2022-02-21 NOTE — ED NOTES
I have reviewed discharge instructions with the patient. The patient verbalized understanding. Patient left ED via Discharge Method: ambulatory to Home with self. Opportunity for questions and clarification provided. Patient given 1 e- scripts. To continue your aftercare when you leave the hospital, you may receive an automated call from our care team to check in on how you are doing. This is a free service and part of our promise to provide the best care and service to meet your aftercare needs.  If you have questions, or wish to unsubscribe from this service please call [phone]. Thank you for Choosing our [facility] Emergency Department.

## 2022-04-16 ENCOUNTER — HOSPITAL ENCOUNTER (EMERGENCY)
Age: 42
Discharge: HOME | End: 2022-04-16
Attending: EMERGENCY MEDICINE

## 2022-04-16 VITALS
WEIGHT: 160 LBS | BODY MASS INDEX: 26.66 KG/M2 | TEMPERATURE: 98.5 F | OXYGEN SATURATION: 98 % | RESPIRATION RATE: 18 BRPM | HEART RATE: 115 BPM | SYSTOLIC BLOOD PRESSURE: 119 MMHG | HEIGHT: 65 IN | DIASTOLIC BLOOD PRESSURE: 88 MMHG

## 2022-04-16 DIAGNOSIS — N30.00 ACUTE CYSTITIS WITHOUT HEMATURIA: Primary | ICD-10-CM

## 2022-04-16 LAB
BACTERIA URNS QL MICRO: 0 /HPF
CASTS URNS QL MICRO: 0 /LPF
CRYSTALS URNS QL MICRO: 0 /LPF
EPI CELLS #/AREA URNS HPF: NORMAL /HPF
MUCOUS THREADS URNS QL MICRO: 0 /LPF
RBC #/AREA URNS HPF: 0 /HPF
WBC URNS QL MICRO: 0 /HPF

## 2022-04-16 PROCEDURE — 74011250637 HC RX REV CODE- 250/637: Performed by: EMERGENCY MEDICINE

## 2022-04-16 PROCEDURE — 81003 URINALYSIS AUTO W/O SCOPE: CPT

## 2022-04-16 PROCEDURE — 81015 MICROSCOPIC EXAM OF URINE: CPT

## 2022-04-16 PROCEDURE — 99283 EMERGENCY DEPT VISIT LOW MDM: CPT

## 2022-04-16 RX ORDER — PHENAZOPYRIDINE HYDROCHLORIDE 95 MG/1
200 TABLET ORAL
Status: DISCONTINUED | OUTPATIENT
Start: 2022-04-16 | End: 2022-04-16 | Stop reason: HOSPADM

## 2022-04-16 RX ORDER — CEPHALEXIN 500 MG/1
500 CAPSULE ORAL 4 TIMES DAILY
Qty: 28 CAPSULE | Refills: 0 | Status: SHIPPED | OUTPATIENT
Start: 2022-04-16 | End: 2022-04-23

## 2022-04-16 RX ORDER — CEPHALEXIN 500 MG/1
500 CAPSULE ORAL
Status: COMPLETED | OUTPATIENT
Start: 2022-04-16 | End: 2022-04-16

## 2022-04-16 RX ORDER — PHENAZOPYRIDINE HYDROCHLORIDE 200 MG/1
200 TABLET, FILM COATED ORAL 3 TIMES DAILY
Qty: 6 TABLET | Refills: 0 | Status: SHIPPED | OUTPATIENT
Start: 2022-04-16 | End: 2022-04-18

## 2022-04-16 RX ADMIN — CEPHALEXIN 500 MG: 500 CAPSULE ORAL at 17:01

## 2022-04-16 NOTE — ED PROVIDER NOTES
Chief complaint : UTI concerns    HISTORY OF PRESENT ILLNESS :  Location : Suprapubic pain    Quality : Pressure, burning, urgency, frequency    Quantity : Constant    Timing : A couple days    Severity : Mild    Context : Seems to get UTIs fairly frequently, usually negative on dipstick, positive on culture    Alleviating / exacerbating factors : No remedies attempted    Associated Symptoms : No fevers, chills, no nausea, no vomiting, no flank pain             Past Medical History:   Diagnosis Date    Arthritis     Psoriasis        Past Surgical History:   Procedure Laterality Date    HX GYN          HX ORTHOPAEDIC      cyst behind right knee         No family history on file. Social History     Socioeconomic History    Marital status: SINGLE     Spouse name: Not on file    Number of children: Not on file    Years of education: Not on file    Highest education level: Not on file   Occupational History    Not on file   Tobacco Use    Smoking status: Current Every Day Smoker     Packs/day: 1.00    Smokeless tobacco: Never Used   Substance and Sexual Activity    Alcohol use: No    Drug use: No    Sexual activity: Not on file   Other Topics Concern    Not on file   Social History Narrative    Not on file     Social Determinants of Health     Financial Resource Strain:     Difficulty of Paying Living Expenses: Not on file   Food Insecurity:     Worried About Running Out of Food in the Last Year: Not on file    Wang of Food in the Last Year: Not on file   Transportation Needs:     Lack of Transportation (Medical): Not on file    Lack of Transportation (Non-Medical):  Not on file   Physical Activity:     Days of Exercise per Week: Not on file    Minutes of Exercise per Session: Not on file   Stress:     Feeling of Stress : Not on file   Social Connections:     Frequency of Communication with Friends and Family: Not on file    Frequency of Social Gatherings with Friends and Family: Not on file    Attends Sab[employer] Services: Not on file    Active Member of Clubs or Organizations: Not on file    Attends Club or Organization Meetings: Not on file    Marital Status: Not on file   Intimate Partner Violence:     Fear of Current or Ex-Partner: Not on file    Emotionally Abused: Not on file    Physically Abused: Not on file    Sexually Abused: Not on file   Housing Stability:     Unable to Pay for Housing in the Last Year: Not on file    Number of [place] in the Last Year: Not on file    Unstable Housing in the Last Year: Not on file         ALLERGIES: Prednisone    Review of Systems   Constitutional: Negative for chills and fever. Gastrointestinal: Positive for abdominal pain. Negative for nausea and vomiting. Genitourinary: Positive for difficulty urinating, dysuria, frequency and urgency. Negative for flank pain. All other systems reviewed and are negative. Vitals:    04/16/22 1438 04/16/22 1545   BP: (!) 159/87 119/88   Pulse: (!) 115    Resp: 18    Temp: 98.5 °F (36.9 °C)    SpO2: 99% 98%   Weight: 72.6 kg (160 lb)    Height: 5' 5\" (1.651 m)             Physical Exam  Vitals and nursing note reviewed. Constitutional:       General: She is not in acute distress. Appearance: Normal appearance. She is well-developed. She is not ill-appearing, toxic-appearing or diaphoretic. HENT:      Head: Normocephalic and atraumatic. Right Ear: External ear normal.      Left Ear: External ear normal.   Eyes:      General:         Right eye: No discharge. Left eye: No discharge. Conjunctiva/sclera: Conjunctivae normal.   Pulmonary:      Effort: Pulmonary effort is normal. No respiratory distress. Abdominal:      Tenderness: There is abdominal tenderness in the suprapubic area. There is no right CVA tenderness or left CVA tenderness. Musculoskeletal:         General: Normal range of motion. Cervical back: Normal range of motion and neck supple.    Skin: General: Skin is warm and dry. Findings: No rash. Neurological:      General: No focal deficit present. Mental Status: She is alert and oriented to person, place, and time. Mental status is at baseline. Motor: No abnormal muscle tone. Comments: cni 2-12 grossly  Nl gait,  Nl speech     Psychiatric:         Mood and Affect: Mood normal.         Behavior: Behavior normal.          MDM  Number of Diagnoses or Management Options  Acute cystitis without hematuria: new and requires workup  Diagnosis management comments: Medical decision making note:  Clinical diagnosis of acute cystitis, dipstick negative, micro and culture pending, will [place] treat empirically  This concludes the \"medical decision making note\" part of this emergency department visit note.          Amount and/or Complexity of Data Reviewed  Clinical lab tests: ordered    Risk of Complications, Morbidity, and/or Mortality  Presenting problems: low  Diagnostic procedures: low  Management options: low    Patient Progress  Patient progress: improved         Procedures

## 2022-04-16 NOTE — ED TRIAGE NOTES
Pt arrives ambulatory to triage. Pt reports onset urinary frequency for 2 days. States lower abd discomfort. States gets UTI every couple of months. Has seen urology before. NAD. Masked.

## 2022-04-20 ENCOUNTER — HOSPITAL ENCOUNTER (EMERGENCY)
Age: 42
Discharge: HOME | End: 2022-04-20
Attending: EMERGENCY MEDICINE

## 2022-04-20 VITALS
OXYGEN SATURATION: 100 % | DIASTOLIC BLOOD PRESSURE: 79 MMHG | TEMPERATURE: 98.2 F | RESPIRATION RATE: 17 BRPM | SYSTOLIC BLOOD PRESSURE: 134 MMHG | HEIGHT: 65 IN | BODY MASS INDEX: 24.99 KG/M2 | HEART RATE: 113 BPM | WEIGHT: 150 LBS

## 2022-04-20 DIAGNOSIS — R30.0 DYSURIA: Primary | ICD-10-CM

## 2022-04-20 PROCEDURE — 81003 URINALYSIS AUTO W/O SCOPE: CPT

## 2022-04-20 PROCEDURE — 99283 EMERGENCY DEPT VISIT LOW MDM: CPT

## 2022-04-20 PROCEDURE — 87086 URINE CULTURE/COLONY COUNT: CPT

## 2022-04-20 RX ORDER — SULFAMETHOXAZOLE AND TRIMETHOPRIM 800; 160 MG/1; MG/1
1 TABLET ORAL 2 TIMES DAILY
Qty: 14 TABLET | Refills: 0 | Status: SHIPPED | OUTPATIENT
Start: 2022-04-20 | End: 2022-04-27

## 2022-04-21 NOTE — ED NOTES
I have reviewed discharge instructions with the patient. The patient verbalized understanding. Patient left ED via Discharge Method: ambulatory to Home with self    Opportunity for questions and clarification provided. Patient given 1 scripts. To continue your aftercare when you leave the hospital, you may receive an automated call from our care team to check in on how you are doing. This is a free service and part of our promise to provide the best care and service to meet your aftercare needs.  If you have questions, or wish to unsubscribe from this service please call [phone]. Thank you for Choosing our [facility] Emergency Department.

## 2022-04-21 NOTE — ED PROVIDER NOTES
Patient is a 58-year-old female who presents with complaint of burning in the suprapubic region. She notes that she has had multiple urinary tract infections in the past and has been infected with things such as MRSA and group B strep. She states that whenever she gets her urine tested days tell her it looks fine but then her culture grows out some sort of bacteria. She has been having these symptoms of burning discomfort and pressure in the suprapubic region associated with increased urinary urgency and frequency for the last 5 days. She did go to [facility] downw 2 nights ago and was started on Keflex but does not feel like it is helping. He denies any fever or chills. No flank pain, no abdominal pain, no nausea or vomiting. She denies any blood in her urine. She denies any vaginal discharge or irritation. The history is provided by the patient. Urinary Pain  Pertinent negatives include no chest pain, no abdominal pain and no shortness of breath. Past Medical History:   Diagnosis Date    Arthritis     Psoriasis        Past Surgical History:   Procedure Laterality Date    HX GYN          HX ORTHOPAEDIC      cyst behind right knee         No family history on file.     Social History     Socioeconomic History    Marital status: SINGLE     Spouse name: Not on file    Number of children: Not on file    Years of education: Not on file    Highest education level: Not on file   Occupational History    Not on file   Tobacco Use    Smoking status: Current Every Day Smoker     Packs/day: 1.00    Smokeless tobacco: Never Used   Substance and Sexual Activity    Alcohol use: No    Drug use: No    Sexual activity: Not on file   Other Topics Concern    Not on file   Social History Narrative    Not on file     Social Determinants of Health     Financial Resource Strain:     Difficulty of Paying Living Expenses: Not on file   Food Insecurity:     Worried About Running Out of Boost[employer] in the Last Year: Not on file    Ran Out of Food in the Last Year: Not on file   Transportation Needs:     Lack of Transportation (Medical): Not on file    Lack of Transportation (Non-Medical): Not on file   Physical Activity:     Days of Exercise per Week: Not on file    Minutes of Exercise per Session: Not on file   Stress:     Feeling of Stress : Not on file   Social Connections:     Frequency of Communication with Friends and Family: Not on file    Frequency of Social Gatherings with Friends and Family: Not on file    Attends Latter-day Services: Not on file    Active Member of [address] [place] [address] or Organizations: Not on file    Attends Club or Organization Meetings: Not on file    Marital Status: Not on file   Intimate Partner Violence:     Fear of Current or Ex-Partner: Not on file    Emotionally Abused: Not on file    Physically Abused: Not on file    Sexually Abused: Not on file   Housing Stability:     Unable to Pay for Housing in the Last Year: Not on file    Number of [place] in the Last Year: Not on file    Unstable Housing in the Last Year: Not on file         ALLERGIES: Prednisone    Review of Systems   Constitutional: Negative for chills, fatigue and fever. HENT: Negative for congestion and sore throat. Respiratory: Negative for cough and shortness of breath. Cardiovascular: Negative for chest pain. Gastrointestinal: Negative for abdominal pain, nausea and vomiting. Genitourinary: Positive for dysuria, frequency and urgency. Negative for flank pain and hematuria. Musculoskeletal: Negative for back pain. Neurological: Negative for light-headedness. Psychiatric/Behavioral: Negative for behavioral problems. Vitals:    04/20/22 1945   BP: 134/79   Pulse: (!) 113   Resp: 17   Temp: 98.2 °F (36.8 °C)   SpO2: 100%   Weight: 68 kg (150 lb)   Height: 5' 5\" (1.651 m)            Physical Exam  Vitals and nursing note reviewed.    Constitutional:       General: She is not in acute distress. Appearance: She is not ill-appearing or toxic-appearing. HENT:      Head: Normocephalic and atraumatic. Cardiovascular:      Rate and Rhythm: Normal rate. Pulses: Normal pulses. Pulmonary:      Effort: Pulmonary effort is normal.      Breath sounds: Normal breath sounds. Abdominal:      General: There is no distension. Palpations: Abdomen is soft. Tenderness: There is no abdominal tenderness. There is no right CVA tenderness or guarding. Skin:     General: Skin is warm and dry. Neurological:      Mental Status: She is alert and oriented to person, place, and time. Psychiatric:         Mood and Affect: Mood normal.         Behavior: Behavior normal.         Thought Content: Thought content normal.         Judgment: Judgment normal.          MDM  Number of Diagnoses or Management Options  Dysuria  Diagnosis management comments: Patient is a 18-year-old female presenting with 5-day history of burning discomfort with urination and increased urinary urgency and frequency consistent with previous tract infections in the past.  Patient reports having cultures that grew out MRSA and strep. She is afebrile, nontoxic in appearance, abdomen soft on exam.  Urine dipstick does not show any findings concerning for an infection. Discussed urine dipstick results with patient she is adamant that that is normal for her and that she always grow something on culture. She was started on Keflex 2 days ago but does not feel like she is getting any improvement. Will DC with prescription for Bactrim, while we await urine culture results. Patient was also provided with information for follow-up with urology for further evaluation of this recurrent issue. Discussed reasons to return to the ER. Patient agreeable to plan.          Procedures

## 2022-04-23 LAB
BACTERIA SPEC CULT: NORMAL
SERVICE CMNT-IMP: NORMAL

## 2022-04-24 NOTE — ED NOTES
Patient called asking about results of her urine culture and I explained that there is mixed skin kane which is a contaminant and she does not have a true UTI but if she is still having any burning with urination she can follow-up with her primary care physician or OB/GYN.

## 2022-06-06 ENCOUNTER — HOSPITAL ENCOUNTER (EMERGENCY)
Dept: CT IMAGING | Age: 42
Discharge: HOME | End: 2022-06-09

## 2022-06-06 ENCOUNTER — APPOINTMENT (OUTPATIENT)
Dept: ULTRASOUND IMAGING | Age: 42
End: 2022-06-06

## 2022-06-06 ENCOUNTER — HOSPITAL ENCOUNTER (EMERGENCY)
Age: 42
Discharge: HOME | End: 2022-06-06
Attending: EMERGENCY MEDICINE

## 2022-06-06 VITALS
DIASTOLIC BLOOD PRESSURE: 95 MMHG | WEIGHT: 170 LBS | HEART RATE: 99 BPM | RESPIRATION RATE: 16 BRPM | BODY MASS INDEX: 28.32 KG/M2 | SYSTOLIC BLOOD PRESSURE: 125 MMHG | TEMPERATURE: 98.9 F | OXYGEN SATURATION: 96 % | HEIGHT: 65 IN

## 2022-06-06 DIAGNOSIS — R42 DIZZINESS: ICD-10-CM

## 2022-06-06 DIAGNOSIS — M79.89 LEFT ARM SWELLING: Primary | ICD-10-CM

## 2022-06-06 DIAGNOSIS — M79.602 LEFT ARM PAIN: ICD-10-CM

## 2022-06-06 LAB
ALBUMIN SERPL-MCNC: 3 G/DL (ref 3.5–5)
ALBUMIN/GLOB SERPL: 0.6 {RATIO} (ref 1.2–3.5)
ALP SERPL-CCNC: 106 U/L (ref 50–136)
ALT SERPL-CCNC: 17 U/L (ref 12–65)
ANION GAP SERPL CALC-SCNC: 7 MMOL/L (ref 7–16)
AST SERPL-CCNC: 12 U/L (ref 15–37)
BASOPHILS # BLD: 0.1 K/UL (ref 0–0.2)
BASOPHILS NFR BLD: 1 % (ref 0–2)
BILIRUB SERPL-MCNC: 0.3 MG/DL (ref 0.2–1.1)
BUN SERPL-MCNC: 9 MG/DL (ref 6–23)
CALCIUM SERPL-MCNC: 8.7 MG/DL (ref 8.3–10.4)
CHLORIDE SERPL-SCNC: 106 MMOL/L (ref 98–107)
CO2 SERPL-SCNC: 25 MMOL/L (ref 21–32)
CREAT SERPL-MCNC: 0.6 MG/DL (ref 0.6–1)
DIFFERENTIAL METHOD BLD: ABNORMAL
EKG ATRIAL RATE: 89 BPM
EKG DIAGNOSIS: NORMAL
EKG P AXIS: 64 DEGREES
EKG P-R INTERVAL: 132 MS
EKG Q-T INTERVAL: 332 MS
EKG QRS DURATION: 74 MS
EKG QTC CALCULATION (BAZETT): 403 MS
EKG R AXIS: 46 DEGREES
EKG T AXIS: 57 DEGREES
EKG VENTRICULAR RATE: 89 BPM
EOSINOPHIL # BLD: 0.3 K/UL (ref 0–0.8)
EOSINOPHIL NFR BLD: 2 % (ref 0.5–7.8)
ERYTHROCYTE [DISTWIDTH] IN BLOOD BY AUTOMATED COUNT: 13.6 % (ref 11.9–14.6)
GLOBULIN SER CALC-MCNC: 5.3 G/DL (ref 2.3–3.5)
GLUCOSE SERPL-MCNC: 102 MG/DL (ref 65–100)
HCT VFR BLD AUTO: 41.6 % (ref 35.8–46.3)
HGB BLD-MCNC: 13.3 G/DL (ref 11.7–15.4)
IMM GRANULOCYTES # BLD AUTO: 0.1 K/UL (ref 0–0.5)
IMM GRANULOCYTES NFR BLD AUTO: 1 % (ref 0–5)
LYMPHOCYTES # BLD: 1.5 K/UL (ref 0.5–4.6)
LYMPHOCYTES NFR BLD: 10 % (ref 13–44)
MCH RBC QN AUTO: 28.5 PG (ref 26.1–32.9)
MCHC RBC AUTO-ENTMCNC: 32 G/DL (ref 31.4–35)
MCV RBC AUTO: 89.1 FL (ref 79.6–97.8)
MONOCYTES # BLD: 1.1 K/UL (ref 0.1–1.3)
MONOCYTES NFR BLD: 7 % (ref 4–12)
NEUTS SEG # BLD: 11.4 K/UL (ref 1.7–8.2)
NEUTS SEG NFR BLD: 79 % (ref 43–78)
NRBC # BLD: 0 K/UL (ref 0–0.2)
PLATELET # BLD AUTO: 410 K/UL (ref 150–450)
PMV BLD AUTO: 9.9 FL (ref 9.4–12.3)
POTASSIUM SERPL-SCNC: 3.8 MMOL/L (ref 3.5–5.1)
PROT SERPL-MCNC: 8.3 G/DL (ref 6.3–8.2)
RBC # BLD AUTO: 4.67 M/UL (ref 4.05–5.2)
SODIUM SERPL-SCNC: 138 MMOL/L (ref 136–145)
WBC # BLD AUTO: 14.4 K/UL (ref 4.3–11.1)

## 2022-06-06 PROCEDURE — 85025 COMPLETE CBC W/AUTO DIFF WBC: CPT

## 2022-06-06 PROCEDURE — 93971 EXTREMITY STUDY: CPT

## 2022-06-06 PROCEDURE — 80053 COMPREHEN METABOLIC PANEL: CPT

## 2022-06-06 PROCEDURE — 99284 EMERGENCY DEPT VISIT MOD MDM: CPT

## 2022-06-06 PROCEDURE — 70450 CT HEAD/BRAIN W/O DYE: CPT

## 2022-06-06 RX ORDER — MECLIZINE HYDROCHLORIDE 25 MG/1
25 TABLET ORAL 3 TIMES DAILY PRN
Qty: 30 TABLET | Refills: 0 | Status: SHIPPED | OUTPATIENT
Start: 2022-06-06 | End: 2022-06-16

## 2022-06-06 RX ORDER — INDOMETHACIN 50 MG/1
50 CAPSULE ORAL 2 TIMES DAILY WITH MEALS
Qty: 60 CAPSULE | Refills: 0 | Status: SHIPPED | OUTPATIENT
Start: 2022-06-06

## 2022-06-06 ASSESSMENT — PAIN DESCRIPTION - ORIENTATION: ORIENTATION: LEFT

## 2022-06-06 ASSESSMENT — ENCOUNTER SYMPTOMS
ABDOMINAL PAIN: 0
NAUSEA: 0
COUGH: 0
SHORTNESS OF BREATH: 0
SORE THROAT: 0
WHEEZING: 0
DIARRHEA: 0
VOMITING: 0
COLOR CHANGE: 0
EYE REDNESS: 0

## 2022-06-06 ASSESSMENT — LIFESTYLE VARIABLES: HOW OFTEN DO YOU HAVE A DRINK CONTAINING ALCOHOL: NEVER

## 2022-06-06 ASSESSMENT — PAIN DESCRIPTION - LOCATION: LOCATION: ARM

## 2022-06-06 ASSESSMENT — PAIN SCALES - GENERAL: PAINLEVEL_OUTOF10: 8

## 2022-06-06 NOTE — ED PROVIDER NOTES
[facility] Emergency Department Provider Note                   PCP:                None Provider               Age: 35 y.o. Sex: female       ICD-10-CM    1. Left arm swelling  M79.89 Vascular duplex upper extremity venous left     Vascular duplex upper extremity venous left       DISPOSITION         New Prescriptions    No medications on file       Orders Placed This Encounter   Procedures    CT HEAD WO CONTRAST    CBC with Auto Differential    Comprehensive Metabolic Panel    POCT Urine Dipstick    POC PREGNANCY UR-QUAL    EKG 12 Lead    Insert peripheral IV        MDM  Number of Diagnoses or Management Options  Left arm swelling  Diagnosis management comments: Patient's neurological exam is normal.  Her head CT scan is negative. Her blood work slight elevation of her white count but was otherwise unremarkable. She has had no fevers or chills suggest any infectious cause of her leukocytosis. I do not think she has any sort of neurologic cause of her dizziness as it does seem more consistent with positional vertigo. I will plan to discharge her home with indomethacin for her left arm pain and Antivert for her dizziness. Angela Streeter is a 35 y.o. female who presents to the Emergency Department with chief complaint of    Chief Complaint   Patient presents with    Dizziness      68-year-old lady presents with concerns about dizziness that has been present for about 3 days and left arm pain that has been present for a little over 2 weeks. She does say that she has been under a lot of stress lately and last night was the first time that she got 8 hours of sleep. With the symptoms she has had no nausea, vomiting, or diarrhea. She had no fevers or chills and no cough or shortness of breath. She denies any injury to her left arm. She says it feels a little swollen in the left wrist but she has not noticed any swelling anywhere else. She has had no redness or wounds.     With respect to her dizziness she says it does feel like the room is spinning around her somewhat. She says that it will come and go and it is not persistent. She has no nausea or vomiting with it. She has no speech or language problems and no vision changes. No other associated symptoms. Elements of this note were created using speech recognition software. As such, errors of speech recognition may be present. Review of Systems   Constitutional: Negative for activity change, chills and fever. HENT: Negative for congestion and sore throat. Eyes: Negative for redness and visual disturbance. Respiratory: Negative for cough, shortness of breath and wheezing. Cardiovascular: Negative for chest pain and palpitations. Gastrointestinal: Negative for abdominal pain, diarrhea, nausea and vomiting. Endocrine: Negative for polydipsia and polyuria. Genitourinary: Negative for flank pain and hematuria. Musculoskeletal: Positive for joint swelling and myalgias. Skin: Negative for color change and rash. Allergic/Immunologic: Negative for immunocompromised state. Neurological: Positive for dizziness. Negative for light-headedness and headaches. Hematological: Negative for adenopathy. Psychiatric/Behavioral: Negative for confusion. Past Medical History:   Diagnosis Date    Arthritis     Psoriasis         Past Surgical History:   Procedure Laterality Date    GYN          ORTHOPEDIC SURGERY      cyst behind right knee        No family history on file.         Social Connections:     Frequency of Communication with Friends and Family: Not on file    Frequency of Social Gatherings with Friends and Family: Not on file    Attends Rastafari Services: Not on file    Active Member of Clubs or Organizations: Not on file    Attends Club or Organization Meetings: Not on file    Marital Status: Not on file        Allergies   Allergen Reactions    Prednisone Itching        Vitals signs and nursing note reviewed. No data found. Physical Exam  Vitals and nursing note reviewed. Constitutional:       Appearance: Normal appearance. HENT:      Head: Normocephalic and atraumatic. Mouth/Throat:      Mouth: Mucous membranes are moist.   Eyes:      General:         Right eye: No discharge. Left eye: No discharge. Cardiovascular:      Rate and Rhythm: Normal rate and regular rhythm. Pulses: Normal pulses. Pulmonary:      Effort: Pulmonary effort is normal.      Breath sounds: Normal breath sounds. No wheezing. Abdominal:      General: Bowel sounds are normal.      Tenderness: There is no abdominal tenderness. There is no guarding or rebound. Musculoskeletal:      Right lower leg: No edema. Left lower leg: No edema. Comments: Minimal swelling at her left wrist and distal left forearm no erythema. She has full range of motion in her left wrist, left elbow, and left shoulder. Lymphadenopathy:      Cervical: No cervical adenopathy. Skin:     Coloration: Skin is not jaundiced. Neurological:      General: No focal deficit present. Mental Status: She is alert and oriented to person, place, and time. Mental status is at baseline. Sensory: No sensory deficit. Motor: No weakness.       Coordination: Coordination normal.      Gait: Gait normal.          Procedures    Labs Reviewed   CBC WITH AUTO DIFFERENTIAL - Abnormal; Notable for the following components:       Result Value    WBC 14.4 (*)     Seg Neutrophils 79 (*)     Lymphocytes 10 (*)     Segs Absolute 11.4 (*)     All other components within normal limits   COMPREHENSIVE METABOLIC PANEL - Abnormal; Notable for the following components:    Glucose 102 (*)     AST 12 (*)     Total Protein 8.3 (*)     Albumin 3.0 (*)     Globulin 5.3 (*)     Albumin/Globulin Ratio 0.6 (*)     All other components within normal limits        Vascular duplex upper extremity venous left   Final Result   No evidence of DVT in the left upper extremity. .            CT HEAD WO CONTRAST   Final Result   Normal head CT                                     Voice dictation software was used during the making of this note. This software is not perfect and grammatical and other typographical errors may be present. This note has not been completely proofread for errors.        Andrew Oliva MD  06/06/22 0589

## 2022-06-06 NOTE — ED NOTES
I have reviewed discharge instructions with the patient. The patient verbalized understanding. Patient left ED via Discharge Method: ambulatory to Home with (insert name of family/friend, self, transport family). Opportunity for questions and clarification provided. Patient given 2 scripts. To continue your aftercare when you leave the hospital, you may receive an automated call from our care team to check in on how you are doing.  This is a free service and part of our promise to provide the best care and service to meet your aftercare needs. \" If you have questions, or wish to unsubscribe from this service please call [phone].  Thank you for Choosing our [facility] Emergency Department.         Odette Lesch, RN  06/06/22 4252

## 2022-06-06 NOTE — Clinical Note
Trinh Garay was seen and treated in our emergency department on 6/6/2022. She may return to work on 06/08/2022. If you have any questions or concerns, please don't hesitate to call.       Kosta Stinson MD

## 2022-06-06 NOTE — Clinical Note
Jessica Jensen was seen and treated in our emergency department on 6/6/2022. She may return to work on 06/08/2022. If you have any questions or concerns, please don't hesitate to call.       Jericho Flores MD

## 2022-06-06 NOTE — ED TRIAGE NOTES
Pt arrives ambulatory to triage. \"I dont feel good\". Pt states swelling to left hand for months and pain in her entire left arm. States dizziness for a week. Denies nvd. Pt states pain everywhere. Hx of arthritis. NAD. Masked.

## 2022-08-11 ENCOUNTER — HOSPITAL ENCOUNTER (EMERGENCY)
Age: 42
Discharge: HOME | End: 2022-08-11
Attending: EMERGENCY MEDICINE

## 2022-08-11 ENCOUNTER — HOSPITAL ENCOUNTER (EMERGENCY)
Dept: GENERAL RADIOLOGY | Age: 42
Discharge: HOME | End: 2022-08-14

## 2022-08-11 VITALS
SYSTOLIC BLOOD PRESSURE: 137 MMHG | BODY MASS INDEX: 28.32 KG/M2 | HEART RATE: 120 BPM | HEIGHT: 65 IN | TEMPERATURE: 98.7 F | OXYGEN SATURATION: 98 % | RESPIRATION RATE: 18 BRPM | WEIGHT: 170 LBS | DIASTOLIC BLOOD PRESSURE: 87 MMHG

## 2022-08-11 DIAGNOSIS — R07.89 CHEST WALL PAIN: Primary | ICD-10-CM

## 2022-08-11 LAB
ALBUMIN SERPL-MCNC: 3.2 G/DL (ref 3.5–5)
ALBUMIN/GLOB SERPL: 0.6 {RATIO} (ref 1.2–3.5)
ALP SERPL-CCNC: 111 U/L (ref 50–136)
ALT SERPL-CCNC: 19 U/L (ref 12–65)
ANION GAP SERPL CALC-SCNC: 5 MMOL/L (ref 7–16)
AST SERPL-CCNC: 16 U/L (ref 15–37)
BILIRUB SERPL-MCNC: 0.2 MG/DL (ref 0.2–1.1)
BUN SERPL-MCNC: 10 MG/DL (ref 6–23)
CALCIUM SERPL-MCNC: 8.7 MG/DL (ref 8.3–10.4)
CHLORIDE SERPL-SCNC: 107 MMOL/L (ref 98–107)
CO2 SERPL-SCNC: 23 MMOL/L (ref 21–32)
CREAT SERPL-MCNC: 0.7 MG/DL (ref 0.6–1)
ERYTHROCYTE [DISTWIDTH] IN BLOOD BY AUTOMATED COUNT: 13.6 % (ref 11.9–14.6)
GLOBULIN SER CALC-MCNC: 5.6 G/DL (ref 2.3–3.5)
GLUCOSE SERPL-MCNC: 108 MG/DL (ref 65–100)
HCT VFR BLD AUTO: 40.1 % (ref 35.8–46.3)
HGB BLD-MCNC: 12.8 G/DL (ref 11.7–15.4)
MCH RBC QN AUTO: 28.5 PG (ref 26.1–32.9)
MCHC RBC AUTO-ENTMCNC: 31.9 G/DL (ref 31.4–35)
MCV RBC AUTO: 89.3 FL (ref 79.6–97.8)
NRBC # BLD: 0 K/UL (ref 0–0.2)
PLATELET # BLD AUTO: 396 K/UL (ref 150–450)
PMV BLD AUTO: 9.6 FL (ref 9.4–12.3)
POTASSIUM SERPL-SCNC: 3.5 MMOL/L (ref 3.5–5.1)
PROT SERPL-MCNC: 8.8 G/DL (ref 6.3–8.2)
RBC # BLD AUTO: 4.49 M/UL (ref 4.05–5.2)
SODIUM SERPL-SCNC: 135 MMOL/L (ref 136–145)
TROPONIN I SERPL HS-MCNC: 4.1 PG/ML (ref 0–14)
TROPONIN I SERPL HS-MCNC: 4.3 PG/ML (ref 0–14)
WBC # BLD AUTO: 12.8 K/UL (ref 4.3–11.1)

## 2022-08-11 PROCEDURE — 80053 COMPREHEN METABOLIC PANEL: CPT

## 2022-08-11 PROCEDURE — 85027 COMPLETE CBC AUTOMATED: CPT

## 2022-08-11 PROCEDURE — 71045 X-RAY EXAM CHEST 1 VIEW: CPT

## 2022-08-11 PROCEDURE — 84484 ASSAY OF TROPONIN QUANT: CPT

## 2022-08-11 PROCEDURE — 99285 EMERGENCY DEPT VISIT HI MDM: CPT

## 2022-08-11 PROCEDURE — 93005 ELECTROCARDIOGRAM TRACING: CPT | Performed by: STUDENT IN AN ORGANIZED HEALTH CARE EDUCATION/TRAINING PROGRAM

## 2022-08-11 RX ORDER — KETOROLAC TROMETHAMINE 30 MG/ML
30 INJECTION, SOLUTION INTRAMUSCULAR; INTRAVENOUS
Status: DISCONTINUED | OUTPATIENT
Start: 2022-08-11 | End: 2022-08-12 | Stop reason: HOSPADM

## 2022-08-11 ASSESSMENT — ENCOUNTER SYMPTOMS
ABDOMINAL PAIN: 0
CONSTIPATION: 0
COUGH: 0
SORE THROAT: 0
BACK PAIN: 0
VOMITING: 0
DIARRHEA: 0
NAUSEA: 0
RHINORRHEA: 0
COLOR CHANGE: 0
SHORTNESS OF BREATH: 0

## 2022-08-11 ASSESSMENT — PAIN - FUNCTIONAL ASSESSMENT: PAIN_FUNCTIONAL_ASSESSMENT: 0-10

## 2022-08-11 ASSESSMENT — PAIN SCALES - GENERAL: PAINLEVEL_OUTOF10: 2

## 2022-08-11 ASSESSMENT — PAIN DESCRIPTION - FREQUENCY: FREQUENCY: CONTINUOUS

## 2022-08-11 NOTE — ED TRIAGE NOTES
Pt to ED ambulatory to triage without difficulty for left sided chest pain that began this afternoon. Pt states she was \"not doing anything and it just came out of the blue. \" Pt states it is not an intense pain more so a constant dull pain. Pt states no cardiac hx at this time. Pt is tachycardic in triage but states walking to the ED today. Pt states hx of psoriatic arthritis but no other significant hx. Pt denies nausea/vomiting/chills/diaphoresis.

## 2022-08-12 LAB
EKG ATRIAL RATE: 110 BPM
EKG DIAGNOSIS: NORMAL
EKG P AXIS: 75 DEGREES
EKG P-R INTERVAL: 134 MS
EKG Q-T INTERVAL: 318 MS
EKG QRS DURATION: 76 MS
EKG QTC CALCULATION (BAZETT): 430 MS
EKG R AXIS: 71 DEGREES
EKG T AXIS: 68 DEGREES
EKG VENTRICULAR RATE: 110 BPM

## 2022-08-12 NOTE — ED PROVIDER NOTES
[facility] Emergency Department Provider Note                   PCP:                None Provider               Age: 38 y.o. Sex: female       ICD-10-CM    1. Chest wall pain  R07.89           DISPOSITION Decision To Discharge 08/11/2022 11:31:16 PM       MDM  Number of Diagnoses or Management Options  Diagnosis management comments: Patient with intermittent left lateral chest pain near the shoulder. No acute on EKG and 2 negative troponins. Will discharge with PCP follow-up. Orders Placed This Encounter   Procedures    XR CHEST PORTABLE    CBC    Comprehensive Metabolic Panel    Troponin    Cardiac Monitor    Pulse Oximetry    EKG 12 Lead    Saline lock IV        Valerie Villasenor is a 38 y.o. female who presents to the Emergency Department with chief complaint of    Chief Complaint   Patient presents with    Chest Pain      Patient has psoriatic arthritis. Has been dealing with some left arm and shoulder pain. This afternoon around 1400 developed left lateral chest pain near the shoulder. Has been intermittent. No shortness of breath nausea numbness or diaphoresis. Came here concerned about the chest pain. The history is provided by the patient. No  was used. Chest Pain  Pain location:  L lateral chest  Pain quality: aching    Pain radiates to:  Does not radiate  Pain severity:  Mild  Duration:  9 hours  Timing:  Intermittent  Progression:  Resolved  Chronicity:  New  Context: stress    Relieved by:  Nothing  Worsened by:  Nothing  Associated symptoms: no abdominal pain, no back pain, no cough, no diaphoresis, no dizziness, no fatigue, no fever, no headache, no lower extremity edema, no nausea, no numbness, no palpitations, no shortness of breath, no vomiting and no weakness      All other systems reviewed and are negative. Review of Systems   Constitutional:  Negative for chills, diaphoresis, fatigue and fever. HENT:  Negative for rhinorrhea and sore throat. Respiratory:  Negative for cough and shortness of breath. Cardiovascular:  Positive for chest pain. Negative for palpitations. Gastrointestinal:  Negative for abdominal pain, constipation, diarrhea, nausea and vomiting. Genitourinary:  Negative for dysuria and hematuria. Musculoskeletal:  Positive for arthralgias. Negative for back pain and neck pain. Skin:  Negative for color change and rash. Neurological:  Negative for dizziness, weakness, numbness and headaches. All other systems reviewed and are negative. Past Medical History:   Diagnosis Date    Arthritis     Psoriasis         Past Surgical History:   Procedure Laterality Date    GYN          ORTHOPEDIC SURGERY      cyst behind right knee        No family history on file. Social History     Socioeconomic History    Marital status: Single   Tobacco Use    Smoking status: Every Day     Packs/day: 1.00     Types: Cigarettes    Smokeless tobacco: Never   Substance and Sexual Activity    Alcohol use: No    Drug use: No        Allergies: Prednisone    Previous Medications    FLUOCINOLONE (SYNALAR) 0.025 % OINTMENT    Apply topically 2 times daily    INDOMETHACIN (INDOCIN) 50 MG CAPSULE    Take 1 capsule by mouth 2 times daily (with meals)    PHENAZOPYRIDINE (PYRIDIUM) 95 MG TABLET    Take 95 mg by mouth 3 times daily as needed        Vitals signs and nursing note reviewed. Patient Vitals for the past 4 hrs:   Temp Pulse Resp BP SpO2   22 2240 -- -- -- 137/87 98 %   22 1946 98.7 °F (37.1 °C) (!) 120 18 138/88 98 %          Physical Exam  Vitals and nursing note reviewed. Constitutional:       Appearance: Normal appearance. HENT:      Head: Normocephalic and atraumatic. Cardiovascular:      Rate and Rhythm: Normal rate and regular rhythm. Pulmonary:      Effort: Pulmonary effort is normal.      Breath sounds: Normal breath sounds. No wheezing. Chest:      Chest wall: No tenderness.    Abdominal:      General: Bowel sounds are normal.      Palpations: Abdomen is soft. Tenderness: There is no abdominal tenderness. Musculoskeletal:         General: No swelling. Normal range of motion. Cervical back: Normal range of motion. No tenderness. Skin:     General: Skin is warm and dry. Neurological:      Mental Status: She is alert. Procedures    ED EKG Interpretation  EKG was interpreted in the absence of a cardiologist.    Rate: Rate: Tachycardia  EKG Interpretation: EKG Interpretation: sinus rhythm  ST Segments: Nonspecific ST segments - NO STEMI    Labs Reviewed   CBC - Abnormal; Notable for the following components:       Result Value    WBC 12.8 (*)     All other components within normal limits   COMPREHENSIVE METABOLIC PANEL - Abnormal; Notable for the following components:    Sodium 135 (*)     Anion Gap 5 (*)     Glucose 108 (*)     Total Protein 8.8 (*)     Albumin 3.2 (*)     Globulin 5.6 (*)     Albumin/Globulin Ratio 0.6 (*)     All other components within normal limits   TROPONIN   TROPONIN        XR CHEST PORTABLE   Final Result   1. No acute cardiopulmonary process evident on single frontal view of the   chest.         This report was made using voice transcription. Despite my best efforts to avoid   any, transcription errors may persist. If there is any question about the   accuracy of the report or need for clarification, then please call [phone], or text me through [email or web address] for clarification or correction. Voice dictation software was used during the making of this note. This software is not perfect and grammatical and other typographical errors may be present. This note has not been completely proofread for errors.      Danny Marquez III, MD  08/11/22 4929
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
## 2022-08-18 ENCOUNTER — HOSPITAL ENCOUNTER (EMERGENCY)
Age: 42
Discharge: HOME | End: 2022-08-18
Attending: GENERAL PRACTICE

## 2022-08-18 VITALS
BODY MASS INDEX: 28.32 KG/M2 | SYSTOLIC BLOOD PRESSURE: 142 MMHG | HEART RATE: 95 BPM | WEIGHT: 170 LBS | OXYGEN SATURATION: 99 % | RESPIRATION RATE: 18 BRPM | HEIGHT: 65 IN | TEMPERATURE: 98.5 F | DIASTOLIC BLOOD PRESSURE: 94 MMHG

## 2022-08-18 DIAGNOSIS — R19.7 DIARRHEA OF PRESUMED INFECTIOUS ORIGIN: Primary | ICD-10-CM

## 2022-08-18 LAB
ALBUMIN SERPL-MCNC: 3.1 G/DL (ref 3.5–5)
ALBUMIN/GLOB SERPL: 0.5 {RATIO} (ref 1.2–3.5)
ALP SERPL-CCNC: 109 U/L (ref 50–136)
ALT SERPL-CCNC: 18 U/L (ref 12–65)
ANION GAP SERPL CALC-SCNC: 8 MMOL/L (ref 7–16)
AST SERPL-CCNC: 18 U/L (ref 15–37)
BASOPHILS # BLD: 0.1 K/UL (ref 0–0.2)
BASOPHILS NFR BLD: 1 % (ref 0–2)
BILIRUB SERPL-MCNC: 0.2 MG/DL (ref 0.2–1.1)
BILIRUB UR QL: NEGATIVE
BUN SERPL-MCNC: 7 MG/DL (ref 6–23)
CALCIUM SERPL-MCNC: 9.1 MG/DL (ref 8.3–10.4)
CHLORIDE SERPL-SCNC: 107 MMOL/L (ref 98–107)
CO2 SERPL-SCNC: 24 MMOL/L (ref 21–32)
CREAT SERPL-MCNC: 0.7 MG/DL (ref 0.6–1)
DIFFERENTIAL METHOD BLD: ABNORMAL
EKG ATRIAL RATE: 128 BPM
EKG DIAGNOSIS: NORMAL
EKG P AXIS: 73 DEGREES
EKG P-R INTERVAL: 136 MS
EKG Q-T INTERVAL: 288 MS
EKG QRS DURATION: 64 MS
EKG QTC CALCULATION (BAZETT): 420 MS
EKG R AXIS: 49 DEGREES
EKG T AXIS: 70 DEGREES
EKG VENTRICULAR RATE: 128 BPM
EOSINOPHIL # BLD: 0.2 K/UL (ref 0–0.8)
EOSINOPHIL NFR BLD: 2 % (ref 0.5–7.8)
ERYTHROCYTE [DISTWIDTH] IN BLOOD BY AUTOMATED COUNT: 13.4 % (ref 11.9–14.6)
GLOBULIN SER CALC-MCNC: 5.9 G/DL (ref 2.3–3.5)
GLUCOSE SERPL-MCNC: 89 MG/DL (ref 65–100)
GLUCOSE UR QL STRIP.AUTO: NEGATIVE MG/DL
HCG UR QL: NEGATIVE
HCT VFR BLD AUTO: 41 % (ref 35.8–46.3)
HGB BLD-MCNC: 13.1 G/DL (ref 11.7–15.4)
IMM GRANULOCYTES # BLD AUTO: 0.1 K/UL (ref 0–0.5)
IMM GRANULOCYTES NFR BLD AUTO: 1 % (ref 0–5)
KETONES UR-MCNC: NEGATIVE MG/DL
LEUKOCYTE ESTERASE UR QL STRIP: NEGATIVE
LIPASE SERPL-CCNC: 91 U/L (ref 73–393)
LYMPHOCYTES # BLD: 1.4 K/UL (ref 0.5–4.6)
LYMPHOCYTES NFR BLD: 11 % (ref 13–44)
MCH RBC QN AUTO: 28.6 PG (ref 26.1–32.9)
MCHC RBC AUTO-ENTMCNC: 32 G/DL (ref 31.4–35)
MCV RBC AUTO: 89.5 FL (ref 79.6–97.8)
MONOCYTES # BLD: 0.7 K/UL (ref 0.1–1.3)
MONOCYTES NFR BLD: 6 % (ref 4–12)
NEUTS SEG # BLD: 10.8 K/UL (ref 1.7–8.2)
NEUTS SEG NFR BLD: 79 % (ref 43–78)
NITRITE UR QL: NEGATIVE
NRBC # BLD: 0 K/UL (ref 0–0.2)
PH UR: 5.5 [PH] (ref 5–9)
PLATELET # BLD AUTO: 473 K/UL (ref 150–450)
PMV BLD AUTO: 9.7 FL (ref 9.4–12.3)
POTASSIUM SERPL-SCNC: 4.2 MMOL/L (ref 3.5–5.1)
PROT SERPL-MCNC: 9 G/DL (ref 6.3–8.2)
PROT UR QL: NEGATIVE MG/DL
RBC # BLD AUTO: 4.58 M/UL (ref 4.05–5.2)
RBC # UR STRIP: NEGATIVE /UL
SERVICE CMNT-IMP: NORMAL
SODIUM SERPL-SCNC: 139 MMOL/L (ref 136–145)
SP GR UR: 1.01 (ref 1–1.02)
UROBILINOGEN UR QL: 0.2 EU/DL (ref 0.2–1)
WBC # BLD AUTO: 13.3 K/UL (ref 4.3–11.1)

## 2022-08-18 PROCEDURE — 80053 COMPREHEN METABOLIC PANEL: CPT

## 2022-08-18 PROCEDURE — 83690 ASSAY OF LIPASE: CPT

## 2022-08-18 PROCEDURE — 85025 COMPLETE CBC W/AUTO DIFF WBC: CPT

## 2022-08-18 PROCEDURE — 99284 EMERGENCY DEPT VISIT MOD MDM: CPT

## 2022-08-18 PROCEDURE — 81025 URINE PREGNANCY TEST: CPT

## 2022-08-18 PROCEDURE — 81003 URINALYSIS AUTO W/O SCOPE: CPT

## 2022-08-18 RX ORDER — DIPHENOXYLATE HYDROCHLORIDE AND ATROPINE SULFATE 2.5; .025 MG/1; MG/1
1 TABLET ORAL 4 TIMES DAILY PRN
Qty: 8 TABLET | Refills: 0 | Status: SHIPPED | OUTPATIENT
Start: 2022-08-18 | End: 2022-08-21

## 2022-08-18 ASSESSMENT — PAIN DESCRIPTION - DESCRIPTORS: DESCRIPTORS: CRAMPING

## 2022-08-18 ASSESSMENT — PAIN DESCRIPTION - ORIENTATION: ORIENTATION: RIGHT

## 2022-08-18 ASSESSMENT — PAIN DESCRIPTION - PAIN TYPE: TYPE: ACUTE PAIN

## 2022-08-18 ASSESSMENT — PAIN - FUNCTIONAL ASSESSMENT: PAIN_FUNCTIONAL_ASSESSMENT: 0-10

## 2022-08-18 ASSESSMENT — PAIN SCALES - GENERAL: PAINLEVEL_OUTOF10: 5

## 2022-08-18 ASSESSMENT — PAIN DESCRIPTION - LOCATION: LOCATION: ABDOMEN

## 2022-08-18 ASSESSMENT — LIFESTYLE VARIABLES
HOW OFTEN DO YOU HAVE A DRINK CONTAINING ALCOHOL: NEVER
HOW MANY STANDARD DRINKS CONTAINING ALCOHOL DO YOU HAVE ON A TYPICAL DAY: PATIENT DOES NOT DRINK

## 2022-08-18 NOTE — ED PROVIDER NOTES
Patient presents with diarrhea. Patient states she has had multiple episodes since this morning. Patient denies blood in the stool and she denies mucus. It is very watery diarrhea. She did have some right lower quadrant abdominal pain yesterday but does not have any pain today. She says the symptoms are very mild yesterday. She does have nausea but no current vomiting. Patient does feel mild fatigue. She denies fevers. [facility] Emergency Department Provider Note                   PCP:                None Provider               Age: 38 y.o. Sex: female     No diagnosis found. DISPOSITION          MDM  Number of Diagnoses or Management Options  Diarrhea of presumed infectious origin: new, needed workup  Diagnosis management comments: Patient presents with diarrhea. It is nonbloody and patient also does not have any abdominal pain or tenderness at this time. Patient is well-appearing and repeat examination is benign and reassuring. Patient is stable for outpatient management and return precautions are given.        Amount and/or Complexity of Data Reviewed  Clinical lab tests: ordered and reviewed  Tests in the medicine section of CPT®: ordered and reviewed  Decide to obtain previous medical records or to obtain history from someone other than the patient: no  Review and summarize past medical records: no  Independent visualization of images, tracings, or specimens: no    Risk of Complications, Morbidity, and/or Mortality  Presenting problems: moderate  Diagnostic procedures: minimal  Management options: moderate    Patient Progress  Patient progress: stable       Orders Placed This Encounter   Procedures    CBC with Diff    CMP    Lipase    Diet NPO    POCT Urine Dipstick    POC Pregnancy Urine Qual    EKG 12 Lead (Select if Upper Abd Pain, or SOB, Diaphoresis or Tachy)    Saline lock IV        Sugey Diane is a 38 y.o. female who presents to the Emergency Department with chief complaint of Chief Complaint   Patient presents with    Abdominal Pain      HPI      Review of Systems   All other systems reviewed and are negative. Past Medical History:   Diagnosis Date    Arthritis     Psoriasis         Past Surgical History:   Procedure Laterality Date    GYN          ORTHOPEDIC SURGERY      cyst behind right knee        History reviewed. No pertinent family history. Social History     Socioeconomic History    Marital status: Single     Spouse name: None    Number of children: None    Years of education: None    Highest education level: None   Tobacco Use    Smoking status: Every Day     Packs/day: 1.00     Types: Cigarettes    Smokeless tobacco: Never   Substance and Sexual Activity    Alcohol use: No    Drug use: No         Prednisone     Previous Medications    FLUOCINOLONE (SYNALAR) 0.025 % OINTMENT    Apply topically 2 times daily    INDOMETHACIN (INDOCIN) 50 MG CAPSULE    Take 1 capsule by mouth 2 times daily (with meals)    PHENAZOPYRIDINE (PYRIDIUM) 95 MG TABLET    Take 95 mg by mouth 3 times daily as needed        Vitals signs and nursing note reviewed. Patient Vitals for the past 4 hrs:   Temp Pulse Resp BP SpO2   22 1542 -- 99 -- (!) 140/98 99 %   22 1253 98.5 °F (36.9 °C) (!) 128 18 (!) 148/103 97 %          Physical Exam  Constitutional:       General: She is not in acute distress. HENT:      Head: Normocephalic and atraumatic. Nose: Nose normal.      Mouth/Throat:      Mouth: Mucous membranes are moist.   Eyes:      Extraocular Movements: Extraocular movements intact. Pupils: Pupils are equal, round, and reactive to light. Cardiovascular:      Rate and Rhythm: Normal rate and regular rhythm. Heart sounds: Normal heart sounds. Pulmonary:      Effort: No respiratory distress. Breath sounds: No wheezing. Abdominal:      General: Abdomen is flat. Palpations: Abdomen is soft. Tenderness: There is no abdominal tenderness. Musculoskeletal:         General: No swelling or tenderness. Cervical back: Normal range of motion. Skin:     Findings: No erythema or rash. Neurological:      General: No focal deficit present. Mental Status: She is oriented to person, place, and time. Psychiatric:         Mood and Affect: Mood normal.         Behavior: Behavior normal.        Procedures      Labs Reviewed   CBC WITH AUTO DIFFERENTIAL - Abnormal; Notable for the following components:       Result Value    WBC 13.3 (*)     Platelets 279 (*)     Seg Neutrophils 79 (*)     Lymphocytes 11 (*)     Segs Absolute 10.8 (*)     All other components within normal limits   COMPREHENSIVE METABOLIC PANEL - Abnormal; Notable for the following components: Total Protein 9.0 (*)     Albumin 3.1 (*)     Globulin 5.9 (*)     Albumin/Globulin Ratio 0.5 (*)     All other components within normal limits   LIPASE   POC PREGNANCY UR-QUAL        No orders to display                          Voice dictation software was used during the making of this note. This software is not perfect and grammatical and other typographical errors may be present. This note has not been completely proofread for errors.      Flory Chang DO  08/18/22 1164

## 2022-08-18 NOTE — ED TRIAGE NOTES
Pt arrives via walking from home Reports it is a 15 min walk for abdominal cramping and diarrhea since last night. Reports frequent urination as well.  Denies n/v.

## 2022-08-18 NOTE — Clinical Note
Dilip Moore was seen and treated in our emergency department on 8/18/2022. She may return to work on 08/22/2022. If you have any questions or concerns, please don't hesitate to call.       Alban Deleon, DO

## 2022-08-22 LAB
BILIRUB UR QL: NEGATIVE
GLUCOSE UR QL STRIP.AUTO: 100 MG/DL
KETONES UR-MCNC: NEGATIVE MG/DL
LEUKOCYTE ESTERASE UR QL STRIP: NEGATIVE
NITRITE UR QL: NEGATIVE
PH UR: 6 [PH] (ref 5–9)
PROT UR QL: NEGATIVE MG/DL
RBC # UR STRIP: ABNORMAL /UL
SP GR UR: 1.01 (ref 1–1.02)
UROBILINOGEN UR QL: 0.2 EU/DL (ref 0.2–1)

## 2022-10-21 ENCOUNTER — HOSPITAL ENCOUNTER (EMERGENCY)
Age: 42
Discharge: HOME | End: 2022-10-21
Attending: EMERGENCY MEDICINE

## 2022-10-21 VITALS
HEART RATE: 115 BPM | SYSTOLIC BLOOD PRESSURE: 136 MMHG | OXYGEN SATURATION: 100 % | RESPIRATION RATE: 20 BRPM | DIASTOLIC BLOOD PRESSURE: 89 MMHG | BODY MASS INDEX: 28.29 KG/M2 | WEIGHT: 170 LBS | TEMPERATURE: 98.6 F

## 2022-10-21 DIAGNOSIS — Z20.2 EXPOSURE TO STD: Primary | ICD-10-CM

## 2022-10-21 DIAGNOSIS — N89.8 VAGINAL DISCHARGE: ICD-10-CM

## 2022-10-21 LAB
BILIRUB UR QL: NEGATIVE
GLUCOSE UR QL STRIP.AUTO: NEGATIVE MG/DL
HCG UR QL: NEGATIVE
KETONES UR-MCNC: NEGATIVE MG/DL
LEUKOCYTE ESTERASE UR QL STRIP: NEGATIVE
NITRITE UR QL: NEGATIVE
PH UR: 6.5 [PH] (ref 5–9)
PROT UR QL: NEGATIVE MG/DL
RBC # UR STRIP: NEGATIVE /UL
SERVICE CMNT-IMP: NORMAL
SERVICE CMNT-IMP: NORMAL
SP GR UR: 1.01 (ref 1–1.02)
UROBILINOGEN UR QL: 0.2 EU/DL (ref 0.2–1)
WET PREP GENITAL: NORMAL
WET PREP GENITAL: NORMAL

## 2022-10-21 PROCEDURE — 6370000000 HC RX 637 (ALT 250 FOR IP): Performed by: PHYSICIAN ASSISTANT

## 2022-10-21 PROCEDURE — 87088 URINE BACTERIA CULTURE: CPT

## 2022-10-21 PROCEDURE — 99284 EMERGENCY DEPT VISIT MOD MDM: CPT

## 2022-10-21 PROCEDURE — 87210 SMEAR WET MOUNT SALINE/INK: CPT

## 2022-10-21 PROCEDURE — 87186 SC STD MICRODIL/AGAR DIL: CPT

## 2022-10-21 PROCEDURE — 2500000003 HC RX 250 WO HCPCS: Performed by: PHYSICIAN ASSISTANT

## 2022-10-21 PROCEDURE — 87086 URINE CULTURE/COLONY COUNT: CPT

## 2022-10-21 PROCEDURE — 81025 URINE PREGNANCY TEST: CPT

## 2022-10-21 PROCEDURE — 81003 URINALYSIS AUTO W/O SCOPE: CPT

## 2022-10-21 PROCEDURE — 96372 THER/PROPH/DIAG INJ SC/IM: CPT

## 2022-10-21 PROCEDURE — 6360000002 HC RX W HCPCS: Performed by: PHYSICIAN ASSISTANT

## 2022-10-21 PROCEDURE — 87591 N.GONORRHOEAE DNA AMP PROB: CPT

## 2022-10-21 RX ORDER — AZITHROMYCIN 250 MG/1
1000 TABLET, FILM COATED ORAL ONCE
Status: COMPLETED | OUTPATIENT
Start: 2022-10-21 | End: 2022-10-21

## 2022-10-21 RX ADMIN — AZITHROMYCIN MONOHYDRATE 1000 MG: 250 TABLET ORAL at 11:27

## 2022-10-21 RX ADMIN — LIDOCAINE HYDROCHLORIDE 500 MG: 10 INJECTION, SOLUTION INFILTRATION; PERINEURAL at 11:28

## 2022-10-21 ASSESSMENT — PAIN - FUNCTIONAL ASSESSMENT: PAIN_FUNCTIONAL_ASSESSMENT: 0-10

## 2022-10-21 ASSESSMENT — PAIN SCALES - GENERAL: PAINLEVEL_OUTOF10: 7

## 2022-10-21 ASSESSMENT — ENCOUNTER SYMPTOMS
SHORTNESS OF BREATH: 0
ABDOMINAL PAIN: 0

## 2022-10-21 NOTE — ED TRIAGE NOTES
Pt arrives ambulatory masked stating pelvic pain x couple weeks. Pt states she has a recent cheating partner, pt concern for STDs. Pt denies burning on urination. Pt states frequent urination.  Pt states foul discharge from vagina

## 2022-10-21 NOTE — ED NOTES
Discharge instructions reviewed with pt. Pt verbalized understanding. Pt ambulatory to exit in stable condition.      Rick Phillips RN  10/21/22 1165

## 2022-10-21 NOTE — ED PROVIDER NOTES
Emergency Department Provider Note                   PCP:                None Provider               Age: 38 y.o. Sex: female       ICD-10-CM    1. Exposure to STD  Z20.2       2. Vaginal discharge  N89.8           DISPOSITION Discharge - Pending Orders Complete 10/21/2022 11:11:29 AM        MDM  Number of Diagnoses or Management Options  Exposure to STD  Vaginal discharge  Diagnosis management comments:     Overall patient is a well-appearing 40-year-old female presenting today for evaluation of vaginal discharge odor. She denies any associated fevers, abdominal pain, nausea or vomiting. No dysuria or hematuria but she does report some increased urinary frequency. Her urine dip is unremarkable today, but patient does state that in the past her urine dipsticks have been normal with positive urine cultures, will add on a urine culture here today given her frequency. She will be contacted with any positive results and treated as needed. In terms of her vaginal discharge today her wet prep is negative for trichomonas, bacterial vaginosis or yeast vaginitis. Gonorrhea and chlamydia is pending, patient does have concerns for STDs today, prophylactic treatment provided here in the emergency department. We also discussed the broad differential diagnosis of causes of her symptoms today, recommend close follow-up with gynecology given patient's age, she agrees with this plan and she will return to the ER for any new or worsening symptoms.        Amount and/or Complexity of Data Reviewed  Clinical lab tests: ordered and reviewed  Tests in the medicine section of CPT®: ordered    Patient Progress  Patient progress: stable       ED Course as of 10/21/22 1126   Fri Oct 21, 2022   1036 Pregnancy, Urine: Negative [KE]      ED Course User Index  [KE] HARSH Jacobson        Orders Placed This Encounter   Procedures    Wet Prep    C.trachomatis N.gonorrhoeae DNA    Culture, Urine    POCT Urine Dipstick    POC Urine Pregnancy (Select for females age 6-55)    POCT Urinalysis no Micro    POC Pregnancy Urine Qual        Medications   cefTRIAXone (ROCEPHIN) 500 mg in lidocaine 1 % 1.4286 mL IM Injection (has no administration in time range)   azithromycin (ZITHROMAX) tablet 1,000 mg (has no administration in time range)       New Prescriptions    No medications on file        Thony Castellanos is a 38 y.o. female who presents to the Emergency Department with chief complaint of    Chief Complaint   Patient presents with    Exposure to STD      40-year-old well-appearing female presents to the emergency department today for vaginal discharge and irritation. Symptoms began a couple weeks ago apparently. She states her main concern was for a yeast infection but states she is also concerned for possible STDs. States that she recently found out that her partner had an another woman and patient is concerned for STDs today. She would like prophylactic testing and treatment. She denies any associated dysuria or hematuria but does report some mild increased urinary frequency. She reports some vaginal odor. Any associated rashes, lesions or discoloration to the pelvic region. She does report history of psoriasis and states that sometimes she will get flareups of her psoriasis in this area. States her last evaluation by gynecology was about 3 years ago and had a normal Pap smear but she is currently out of work and has not followed up with one in quite some time. Denies associated abdominal pain, nausea, vomiting, or fevers. The history is provided by the patient. No  was used. Review of Systems   Constitutional:  Negative for activity change, chills and fever. Respiratory:  Negative for shortness of breath. Cardiovascular:  Negative for chest pain. Gastrointestinal:  Negative for abdominal pain. Genitourinary:  Positive for vaginal discharge. Negative for dysuria and hematuria.    Skin:  Negative for rash. Past Medical History:   Diagnosis Date    Arthritis     Psoriasis         Past Surgical History:   Procedure Laterality Date    GYN          ORTHOPEDIC SURGERY      cyst behind right knee        No family history on file. Social History     Socioeconomic History    Marital status: Single   Tobacco Use    Smoking status: Every Day     Packs/day: 1.00     Types: Cigarettes    Smokeless tobacco: Never   Substance and Sexual Activity    Alcohol use: No    Drug use: No         Prednisone     Previous Medications    FLUOCINOLONE (SYNALAR) 0.025 % OINTMENT    Apply topically 2 times daily    INDOMETHACIN (INDOCIN) 50 MG CAPSULE    Take 1 capsule by mouth 2 times daily (with meals)    PHENAZOPYRIDINE (PYRIDIUM) 95 MG TABLET    Take 95 mg by mouth 3 times daily as needed        Vitals signs and nursing note reviewed. Patient Vitals for the past 4 hrs:   Temp Pulse Resp BP SpO2   10/21/22 0946 98.6 °F (37 °C) (!) 115 20 136/89 100 %          Physical Exam  Vitals and nursing note reviewed. Constitutional:       General: She is not in acute distress. Appearance: Normal appearance. HENT:      Head: Normocephalic and atraumatic. Eyes:      Conjunctiva/sclera: Conjunctivae normal.   Pulmonary:      Effort: Pulmonary effort is normal.   Genitourinary:     Comments: deferred  Skin:     General: Skin is warm and dry. Capillary Refill: Capillary refill takes less than 2 seconds. Neurological:      General: No focal deficit present. Mental Status: She is alert and oriented to person, place, and time. Psychiatric:         Mood and Affect: Mood normal.         Thought Content:  Thought content normal.         Judgment: Judgment normal.        Procedures    Results for orders placed or performed during the hospital encounter of 10/21/22   Wet Prep    Specimen: Vaginal   Result Value Ref Range    Special Requests NO SPECIAL REQUESTS      Wet Prep NO YEAST,TRICHOMONAS OR CLUE CELLS NOTED Wet Prep 0 TO 2 WBC'S HPF    POCT Urinalysis no Micro   Result Value Ref Range    Specific Gravity, Urine, POC 1.015 1.001 - 1.023      pH, Urine, POC 6.5 5.0 - 9.0      Protein, Urine, POC Negative NEG mg/dL    Glucose, UA POC Negative NEG mg/dL    Ketones, Urine, POC Negative NEG mg/dL    Bilirubin, Urine, POC Negative NEG      Blood, UA POC Negative NEG      URINE UROBILINOGEN POC 0.2 0.2 - 1.0 EU/dL    Nitrate, Urine, POC Negative NEG      Leukocyte Est, UA POC Negative NEG      Performed by: Rick Phillips    POC Pregnancy Urine Qual   Result Value Ref Range    Preg Test, Ur Negative NEG          No orders to display                       Voice dictation software was used during the making of this note. This software is not perfect and grammatical and other typographical errors may be present. This note has not been completely proofread for errors.         Ashlee Almanzar  10/21/22 1126

## 2022-10-21 NOTE — DISCHARGE INSTRUCTIONS
You were tested for gonorrhea and chlamydia today. These test results are pending, you can check [facility] for these results in the next 48 hours. You were prophylactically treated in the emergency department today. You should abstain from any sexual activity for at least 10 days while you complete treatment and until symptoms completely resolved. Your partner should be tested and treated as well. Your urine did not show any signs of urinary tract infection today, but I did send the urine for culture, we will get these results in a couple of days and if treatment needs to be started we will give you a call. Schedule a follow-up appointment with Dr. Leyla David with OB/GYN. Follow-up with your PCP in 1 to 2 days if no improvement. Return to the ER for any new or worsening symptoms.

## 2022-10-25 LAB
BACTERIA SPEC CULT: ABNORMAL
SERVICE CMNT-IMP: ABNORMAL

## 2022-10-25 RX ORDER — SULFAMETHOXAZOLE AND TRIMETHOPRIM 800; 160 MG/1; MG/1
1 TABLET ORAL 2 TIMES DAILY
Qty: 14 TABLET | Refills: 0 | Status: SHIPPED | OUTPATIENT
Start: 2022-10-25 | End: 2022-11-01

## 2022-10-25 NOTE — ED NOTES
10:23 AM  Spoke with patient over the phone regarding test results. She was seen on 10/21/22 for pelvic discomfort. Urine culture +MRSA. At this time patient is not on any antibiotics. Will give prescription for bactrim q12 hrs x 7 days. Pt to  script from ED.       Lito Arthur  10/25/22 5216

## 2022-11-08 ENCOUNTER — HOSPITAL ENCOUNTER (EMERGENCY)
Age: 42
Discharge: HOME | End: 2022-11-08

## 2022-11-08 VITALS
HEART RATE: 90 BPM | SYSTOLIC BLOOD PRESSURE: 138 MMHG | WEIGHT: 170 LBS | TEMPERATURE: 98.5 F | DIASTOLIC BLOOD PRESSURE: 94 MMHG | HEIGHT: 65 IN | OXYGEN SATURATION: 97 % | RESPIRATION RATE: 18 BRPM | BODY MASS INDEX: 28.32 KG/M2

## 2022-11-08 DIAGNOSIS — L03.314 CELLULITIS OF GROIN, LEFT: Primary | ICD-10-CM

## 2022-11-08 DIAGNOSIS — B95.62 MRSA CELLULITIS: ICD-10-CM

## 2022-11-08 DIAGNOSIS — L03.90 MRSA CELLULITIS: ICD-10-CM

## 2022-11-08 LAB
ALBUMIN SERPL-MCNC: 3.4 G/DL (ref 3.5–5)
ALBUMIN/GLOB SERPL: 0.6 {RATIO} (ref 0.4–1.6)
ALP SERPL-CCNC: 105 U/L (ref 50–136)
ALT SERPL-CCNC: 26 U/L (ref 12–65)
ANION GAP SERPL CALC-SCNC: 6 MMOL/L (ref 2–11)
AST SERPL-CCNC: 16 U/L (ref 15–37)
BASOPHILS # BLD: 0.1 K/UL (ref 0–0.2)
BASOPHILS NFR BLD: 1 % (ref 0–2)
BILIRUB SERPL-MCNC: 0.4 MG/DL (ref 0.2–1.1)
BUN SERPL-MCNC: 7 MG/DL (ref 6–23)
CALCIUM SERPL-MCNC: 9 MG/DL (ref 8.3–10.4)
CHLORIDE SERPL-SCNC: 107 MMOL/L (ref 101–110)
CO2 SERPL-SCNC: 25 MMOL/L (ref 21–32)
CREAT SERPL-MCNC: 0.8 MG/DL (ref 0.6–1)
DIFFERENTIAL METHOD BLD: ABNORMAL
EOSINOPHIL # BLD: 0.3 K/UL (ref 0–0.8)
EOSINOPHIL NFR BLD: 2 % (ref 0.5–7.8)
ERYTHROCYTE [DISTWIDTH] IN BLOOD BY AUTOMATED COUNT: 14.1 % (ref 11.9–14.6)
GLOBULIN SER CALC-MCNC: 5.4 G/DL (ref 2.8–4.5)
GLUCOSE SERPL-MCNC: 126 MG/DL (ref 65–100)
HCT VFR BLD AUTO: 44.6 % (ref 35.8–46.3)
HGB BLD-MCNC: 14.3 G/DL (ref 11.7–15.4)
IMM GRANULOCYTES # BLD AUTO: 0.1 K/UL (ref 0–0.5)
IMM GRANULOCYTES NFR BLD AUTO: 1 % (ref 0–5)
LIPASE SERPL-CCNC: 96 U/L (ref 73–393)
LYMPHOCYTES # BLD: 1.2 K/UL (ref 0.5–4.6)
LYMPHOCYTES NFR BLD: 10 % (ref 13–44)
MCH RBC QN AUTO: 29.1 PG (ref 26.1–32.9)
MCHC RBC AUTO-ENTMCNC: 32.1 G/DL (ref 31.4–35)
MCV RBC AUTO: 90.7 FL (ref 82–102)
MONOCYTES # BLD: 1 K/UL (ref 0.1–1.3)
MONOCYTES NFR BLD: 8 % (ref 4–12)
NEUTS SEG # BLD: 10.3 K/UL (ref 1.7–8.2)
NEUTS SEG NFR BLD: 78 % (ref 43–78)
NRBC # BLD: 0 K/UL (ref 0–0.2)
PLATELET # BLD AUTO: 430 K/UL (ref 150–450)
PMV BLD AUTO: 10 FL (ref 9.4–12.3)
POTASSIUM SERPL-SCNC: 4 MMOL/L (ref 3.5–5.1)
PROT SERPL-MCNC: 8.8 G/DL (ref 6.3–8.2)
RBC # BLD AUTO: 4.92 M/UL (ref 4.05–5.2)
SODIUM SERPL-SCNC: 138 MMOL/L (ref 133–143)
WBC # BLD AUTO: 12.9 K/UL (ref 4.3–11.1)

## 2022-11-08 PROCEDURE — 83690 ASSAY OF LIPASE: CPT

## 2022-11-08 PROCEDURE — 80053 COMPREHEN METABOLIC PANEL: CPT

## 2022-11-08 PROCEDURE — 99283 EMERGENCY DEPT VISIT LOW MDM: CPT

## 2022-11-08 PROCEDURE — 85025 COMPLETE CBC W/AUTO DIFF WBC: CPT

## 2022-11-08 RX ORDER — CLINDAMYCIN HYDROCHLORIDE 300 MG/1
300 CAPSULE ORAL 4 TIMES DAILY
Qty: 40 CAPSULE | Refills: 0 | Status: SHIPPED | OUTPATIENT
Start: 2022-11-08 | End: 2022-11-18

## 2022-11-08 RX ORDER — FLUCONAZOLE 150 MG/1
150 TABLET ORAL ONCE
Qty: 2 TABLET | Refills: 0 | Status: SHIPPED | OUTPATIENT
Start: 2022-11-08 | End: 2022-11-08

## 2022-11-08 ASSESSMENT — ENCOUNTER SYMPTOMS
ABDOMINAL PAIN: 1
EYES NEGATIVE: 1
RESPIRATORY NEGATIVE: 1
SHORTNESS OF BREATH: 0

## 2022-11-08 ASSESSMENT — PAIN SCALES - GENERAL: PAINLEVEL_OUTOF10: 0

## 2022-11-08 ASSESSMENT — PAIN - FUNCTIONAL ASSESSMENT: PAIN_FUNCTIONAL_ASSESSMENT: 0-10

## 2022-11-08 NOTE — ED TRIAGE NOTES
Pt ambulatory to triage. Pt states she was seen here a few weeks ago, pt states she was dx with a UTI and given abx for this. Pt states she was told she has MRSA as well. Pt still has lower abd pain and also complains of a \"boil-like\" area on her left bikini line. Pt is concerned that this area might have something to do with her abd pain. Pt denies chest pain, shob, n/v/d, fever/chills, dysuria, hematuria, body aches, headache.

## 2022-11-08 NOTE — ED PROVIDER NOTES
[facility] Emergency Department Provider Note                     PCP:                Md Malathi De Souza               Age: 38 y.o. Sex: female           ICD-10-CM    1. Cellulitis of groin, left  L03.314       2. MRSA cellulitis  L03.90     B95.62           DISPOSITION Decision To Discharge 11/08/2022 10:56:32 AM       New Prescriptions    CLINDAMYCIN (CLEOCIN) 300 MG CAPSULE    Take 1 capsule by mouth 4 times daily for 10 days    FLUCONAZOLE (DIFLUCAN) 150 MG TABLET    Take 1 tablet by mouth once for 1 dose Repeat in 1 week    MICONAZOLE (MICOTIN) 2 % CREAM    Apply topically 2 times daily. MDM  Number of Diagnoses or Management Options  Diagnosis management comments: Patient is having repeated gynecological complaints we will treat her MRSA cellulitis with clinda since the Bactrim helped some but did not get the infection to go away. There does not appear to be an abscess more indurated skin will also cover for yeast infection in this area as well.     Will give her multiple options for outpatient follow-up       Amount and/or Complexity of Data Reviewed  Clinical lab tests: reviewed  Tests in the medicine section of CPT®: reviewed  Decide to obtain previous medical records or to obtain history from someone other than the patient: yes  Review and summarize past medical records: yes    Risk of Complications, Morbidity, and/or Mortality  Presenting problems: moderate  Diagnostic procedures: low  Management options: moderate        Orders Placed This Encounter   Procedures    CBC with Diff    CMP    Lipase    Diet NPO    POCT Urine Dipstick    POC Pregnancy Urine Qual    Saline lock IV        Md Tavares Figueroa    Schedule an appointment as soon as possible for a visit       [address] [address]  [phone]  Schedule an appointment as soon as possible for a visit       [address] [address] [place] [phone]  Go to   As needed    [facility] Department  Armida Chairez [address] [id number] [address]         Fabrizio Granger is a 38 y.o. female who presents to the Emergency Department with chief complaint of    Chief Complaint   Patient presents with    Abdominal Pain      Seen several days ago for urinary tract infection and possible STD exposure she was called with results of her urine that showed MRSA. She been given a course of Bactrim and had improved the symptoms but now her pain is back. Patient has a area of cellulitis in her left groin    The history is provided by the patient. Abdominal Pain  Pain location: Left groin. Pain quality: aching    Pain radiates to:  Groin  Pain severity:  Moderate  Onset quality:  Gradual  Timing:  Intermittent  Progression:  Worsening  Relieved by:  Nothing  Associated symptoms: no chest pain, no chills, no fatigue, no fever and no shortness of breath      Review of Systems   Constitutional: Negative. Negative for activity change, appetite change, chills, fatigue and fever. HENT: Negative. Eyes: Negative. Respiratory: Negative. Negative for shortness of breath. Cardiovascular: Negative. Negative for chest pain. Gastrointestinal:  Positive for abdominal pain. Endocrine: Negative. Musculoskeletal: Negative. Neurological: Negative. Hematological: Negative. Psychiatric/Behavioral: Negative. All other systems reviewed and are negative. All other systems reviewed and are negative. Past Medical History:   Diagnosis Date    Arthritis     Psoriasis         Past Surgical History:   Procedure Laterality Date    GYN          ORTHOPEDIC SURGERY      cyst behind right knee        No family history on file. Social Connections: Not on file        Allergies   Allergen Reactions    Prednisone Itching        Vitals signs and nursing note reviewed.    Patient Vitals for the past 4 hrs:   Temp Pulse Resp BP SpO2   11/08/22 0710 98.5 °F (36.9 °C) (!) 108 18 (!) 138/94 99 %          Physical Exam  Vitals and nursing note reviewed. Constitutional:       Appearance: Normal appearance. She is not ill-appearing. HENT:      Head: Normocephalic and atraumatic. Right Ear: External ear normal.      Left Ear: External ear normal.      Nose: Nose normal.      Mouth/Throat:      Mouth: Mucous membranes are moist.   Eyes:      Extraocular Movements: Extraocular movements intact. Conjunctiva/sclera: Conjunctivae normal.      Pupils: Pupils are equal, round, and reactive to light. Cardiovascular:      Rate and Rhythm: Normal rate and regular rhythm. Pulses: Normal pulses. Heart sounds: Normal heart sounds. Pulmonary:      Effort: Pulmonary effort is normal. No respiratory distress. Breath sounds: Normal breath sounds. Abdominal:      General: Bowel sounds are normal. There is no distension. Palpations: Abdomen is soft. Genitourinary:         Comments: Red raised indurated skin  Musculoskeletal:         General: No swelling or signs of injury. Normal range of motion. Cervical back: Normal range of motion. No rigidity. Skin:     General: Skin is warm and dry. Capillary Refill: Capillary refill takes less than 2 seconds. Neurological:      General: No focal deficit present. Mental Status: She is alert and oriented to person, place, and time. Mental status is at baseline. Psychiatric:         Mood and Affect: Mood normal.         Behavior: Behavior normal.         Thought Content:  Thought content normal.         Judgment: Judgment normal.        Procedures    Labs Reviewed   CBC WITH AUTO DIFFERENTIAL - Abnormal; Notable for the following components:       Result Value    WBC 12.9 (*)     Lymphocytes 10 (*)     Segs Absolute 10.3 (*)     All other components within normal limits   COMPREHENSIVE METABOLIC PANEL - Abnormal; Notable for the following components:    Glucose 126 (*)     Total Protein 8.8 (*)     Albumin 3.4 (*)     Globulin 5.4 (*)     All other components within normal limits   LIPASE   POC PREGNANCY UR-QUAL        No orders to display              Childs Coma Scale  Eye Opening: Spontaneous  Best Verbal Response: Oriented  Best Motor Response: Obeys commands  Ana María Coma Scale Score: 15                     CIWA Assessment  BP: (!) 138/94  Heart Rate: (!) 108                       Voice dictation software was used during the making of this note. This software is not perfect and grammatical and other typographical errors may be present. This note has not been completely proofread for errors.         Maxim Adams MD  11/08/22 1107

## 2022-11-08 NOTE — ED NOTES
I have reviewed discharge instructions with the patient. The patient verbalized understanding. Patient left ED via Discharge Method: ambulatory to Home with self. Opportunity for questions and clarification provided. Patient given 3 scripts. To continue your aftercare when you leave the hospital, you may receive an automated call from our care team to check in on how you are doing. This is a free service and part of our promise to provide the best care and service to meet your aftercare needs.  If you have questions, or wish to unsubscribe from this service please call [phone]. Thank you for Choosing our [facility] Emergency Department.         [place] RN  11/08/22 1238

## 2022-12-11 ENCOUNTER — HOSPITAL ENCOUNTER (EMERGENCY)
Dept: GENERAL RADIOLOGY | Age: 42
Discharge: HOME | End: 2022-12-14

## 2022-12-11 ENCOUNTER — HOSPITAL ENCOUNTER (INPATIENT)
Age: 42
LOS: 3 days | Discharge: HOME | End: 2022-12-14
Attending: EMERGENCY MEDICINE | Admitting: INTERNAL MEDICINE

## 2022-12-11 ENCOUNTER — APPOINTMENT (OUTPATIENT)
Dept: CT IMAGING | Age: 42
End: 2022-12-11

## 2022-12-11 DIAGNOSIS — L03.90 CELLULITIS, UNSPECIFIED CELLULITIS SITE: Primary | ICD-10-CM

## 2022-12-11 PROBLEM — N39.0 UTI (URINARY TRACT INFECTION) WITH PYURIA: Status: ACTIVE | Noted: 2022-12-11

## 2022-12-11 PROBLEM — L40.9 PSORIASIS: Status: ACTIVE | Noted: 2022-12-11

## 2022-12-11 LAB
ALBUMIN SERPL-MCNC: 3.5 G/DL (ref 3.5–5)
ALBUMIN/GLOB SERPL: 0.7 {RATIO} (ref 0.4–1.6)
ALP SERPL-CCNC: 99 U/L (ref 50–136)
ALT SERPL-CCNC: 21 U/L (ref 12–65)
ANION GAP SERPL CALC-SCNC: 7 MMOL/L (ref 2–11)
APPEARANCE UR: ABNORMAL
AST SERPL-CCNC: 14 U/L (ref 15–37)
BACTERIA URNS QL MICRO: ABNORMAL /HPF
BASOPHILS # BLD: 0.1 K/UL (ref 0–0.2)
BASOPHILS NFR BLD: 1 % (ref 0–2)
BILIRUB SERPL-MCNC: 0.3 MG/DL (ref 0.2–1.1)
BILIRUB UR QL: NEGATIVE
BUN SERPL-MCNC: 8 MG/DL (ref 6–23)
CALCIUM SERPL-MCNC: 8.9 MG/DL (ref 8.3–10.4)
CASTS URNS QL MICRO: ABNORMAL /LPF
CHLORIDE SERPL-SCNC: 108 MMOL/L (ref 101–110)
CO2 SERPL-SCNC: 23 MMOL/L (ref 21–32)
COLOR UR: ABNORMAL
CREAT SERPL-MCNC: 0.9 MG/DL (ref 0.6–1)
DIFFERENTIAL METHOD BLD: NORMAL
EKG ATRIAL RATE: 99 BPM
EKG DIAGNOSIS: NORMAL
EKG P AXIS: 72 DEGREES
EKG P-R INTERVAL: 144 MS
EKG Q-T INTERVAL: 323 MS
EKG QRS DURATION: 77 MS
EKG QTC CALCULATION (BAZETT): 413 MS
EKG R AXIS: 53 DEGREES
EKG T AXIS: 60 DEGREES
EKG VENTRICULAR RATE: 98 BPM
EOSINOPHIL # BLD: 0.3 K/UL (ref 0–0.8)
EOSINOPHIL NFR BLD: 3 % (ref 0.5–7.8)
EPI CELLS #/AREA URNS HPF: ABNORMAL /HPF
ERYTHROCYTE [DISTWIDTH] IN BLOOD BY AUTOMATED COUNT: 13.9 % (ref 11.9–14.6)
GLOBULIN SER CALC-MCNC: 5.1 G/DL (ref 2.8–4.5)
GLUCOSE SERPL-MCNC: 144 MG/DL (ref 65–100)
GLUCOSE UR STRIP.AUTO-MCNC: NEGATIVE MG/DL
HCG UR QL: NEGATIVE
HCT VFR BLD AUTO: 44.2 % (ref 35.8–46.3)
HGB BLD-MCNC: 14.3 G/DL (ref 11.7–15.4)
HGB UR QL STRIP: NEGATIVE
IMM GRANULOCYTES # BLD AUTO: 0.1 K/UL (ref 0–0.5)
IMM GRANULOCYTES NFR BLD AUTO: 1 % (ref 0–5)
KETONES UR QL STRIP.AUTO: ABNORMAL MG/DL
LACTATE SERPL-SCNC: 1.4 MMOL/L (ref 0.4–2)
LACTATE SERPL-SCNC: 3.7 MMOL/L (ref 0.4–2)
LEUKOCYTE ESTERASE UR QL STRIP.AUTO: ABNORMAL
LYMPHOCYTES # BLD: 1.8 K/UL (ref 0.5–4.6)
LYMPHOCYTES NFR BLD: 17 % (ref 13–44)
MCH RBC QN AUTO: 29.1 PG (ref 26.1–32.9)
MCHC RBC AUTO-ENTMCNC: 32.4 G/DL (ref 31.4–35)
MCV RBC AUTO: 90 FL (ref 82–102)
MONOCYTES # BLD: 1 K/UL (ref 0.1–1.3)
MONOCYTES NFR BLD: 9 % (ref 4–12)
NEUTS SEG # BLD: 7.3 K/UL (ref 1.7–8.2)
NEUTS SEG NFR BLD: 69 % (ref 43–78)
NITRITE UR QL STRIP.AUTO: NEGATIVE
NRBC # BLD: 0 K/UL (ref 0–0.2)
PH UR STRIP: 6 [PH] (ref 5–9)
PLATELET # BLD AUTO: 419 K/UL (ref 150–450)
PMV BLD AUTO: 10 FL (ref 9.4–12.3)
POTASSIUM SERPL-SCNC: 4 MMOL/L (ref 3.5–5.1)
PROCALCITONIN SERPL-MCNC: <0.05 NG/ML (ref 0–0.49)
PROT SERPL-MCNC: 8.6 G/DL (ref 6.3–8.2)
PROT UR STRIP-MCNC: NEGATIVE MG/DL
RBC # BLD AUTO: 4.91 M/UL (ref 4.05–5.2)
RBC #/AREA URNS HPF: ABNORMAL /HPF
SODIUM SERPL-SCNC: 138 MMOL/L (ref 133–143)
SP GR UR REFRACTOMETRY: 1.01 (ref 1–1.02)
UROBILINOGEN UR QL STRIP.AUTO: 0.2 EU/DL (ref 0.2–1)
WBC # BLD AUTO: 10.6 K/UL (ref 4.3–11.1)
WBC URNS QL MICRO: ABNORMAL /HPF

## 2022-12-11 PROCEDURE — 87086 URINE CULTURE/COLONY COUNT: CPT

## 2022-12-11 PROCEDURE — 81025 URINE PREGNANCY TEST: CPT

## 2022-12-11 PROCEDURE — 1100000000 HC RM PRIVATE

## 2022-12-11 PROCEDURE — 84145 PROCALCITONIN (PCT): CPT

## 2022-12-11 PROCEDURE — 85025 COMPLETE CBC W/AUTO DIFF WBC: CPT

## 2022-12-11 PROCEDURE — 74177 CT ABD & PELVIS W/CONTRAST: CPT

## 2022-12-11 PROCEDURE — 96367 TX/PROPH/DG ADDL SEQ IV INF: CPT

## 2022-12-11 PROCEDURE — 99285 EMERGENCY DEPT VISIT HI MDM: CPT

## 2022-12-11 PROCEDURE — 80053 COMPREHEN METABOLIC PANEL: CPT

## 2022-12-11 PROCEDURE — 87040 BLOOD CULTURE FOR BACTERIA: CPT

## 2022-12-11 PROCEDURE — 6360000002 HC RX W HCPCS: Performed by: EMERGENCY MEDICINE

## 2022-12-11 PROCEDURE — 81001 URINALYSIS AUTO W/SCOPE: CPT

## 2022-12-11 PROCEDURE — 2580000003 HC RX 258: Performed by: INTERNAL MEDICINE

## 2022-12-11 PROCEDURE — 6360000004 HC RX CONTRAST MEDICATION: Performed by: EMERGENCY MEDICINE

## 2022-12-11 PROCEDURE — 71045 X-RAY EXAM CHEST 1 VIEW: CPT

## 2022-12-11 PROCEDURE — 6370000000 HC RX 637 (ALT 250 FOR IP): Performed by: INTERNAL MEDICINE

## 2022-12-11 PROCEDURE — 83605 ASSAY OF LACTIC ACID: CPT

## 2022-12-11 PROCEDURE — 2580000003 HC RX 258: Performed by: EMERGENCY MEDICINE

## 2022-12-11 PROCEDURE — 96365 THER/PROPH/DIAG IV INF INIT: CPT

## 2022-12-11 PROCEDURE — 6360000002 HC RX W HCPCS: Performed by: INTERNAL MEDICINE

## 2022-12-11 PROCEDURE — 93005 ELECTROCARDIOGRAM TRACING: CPT | Performed by: EMERGENCY MEDICINE

## 2022-12-11 RX ORDER — SODIUM CHLORIDE 0.9 % (FLUSH) 0.9 %
5-40 SYRINGE (ML) INJECTION PRN
Status: DISCONTINUED | OUTPATIENT
Start: 2022-12-11 | End: 2022-12-14 | Stop reason: HOSPADM

## 2022-12-11 RX ORDER — SODIUM CHLORIDE 0.9 % (FLUSH) 0.9 %
5-40 SYRINGE (ML) INJECTION EVERY 12 HOURS SCHEDULED
Status: DISCONTINUED | OUTPATIENT
Start: 2022-12-11 | End: 2022-12-14 | Stop reason: HOSPADM

## 2022-12-11 RX ORDER — ACETAMINOPHEN 325 MG/1
650 TABLET ORAL EVERY 6 HOURS PRN
Status: DISCONTINUED | OUTPATIENT
Start: 2022-12-11 | End: 2022-12-14 | Stop reason: HOSPADM

## 2022-12-11 RX ORDER — SODIUM CHLORIDE 9 MG/ML
INJECTION, SOLUTION INTRAVENOUS PRN
Status: DISCONTINUED | OUTPATIENT
Start: 2022-12-11 | End: 2022-12-14 | Stop reason: HOSPADM

## 2022-12-11 RX ORDER — ONDANSETRON 4 MG/1
4 TABLET, ORALLY DISINTEGRATING ORAL EVERY 8 HOURS PRN
Status: DISCONTINUED | OUTPATIENT
Start: 2022-12-11 | End: 2022-12-14 | Stop reason: HOSPADM

## 2022-12-11 RX ORDER — MAGNESIUM HYDROXIDE/ALUMINUM HYDROXICE/SIMETHICONE 120; 1200; 1200 MG/30ML; MG/30ML; MG/30ML
30 SUSPENSION ORAL EVERY 6 HOURS PRN
Status: DISCONTINUED | OUTPATIENT
Start: 2022-12-11 | End: 2022-12-14 | Stop reason: HOSPADM

## 2022-12-11 RX ORDER — FAMOTIDINE 20 MG/1
10 TABLET, FILM COATED ORAL DAILY PRN
Status: DISCONTINUED | OUTPATIENT
Start: 2022-12-11 | End: 2022-12-14 | Stop reason: HOSPADM

## 2022-12-11 RX ORDER — ACETAMINOPHEN 650 MG/1
650 SUPPOSITORY RECTAL EVERY 6 HOURS PRN
Status: DISCONTINUED | OUTPATIENT
Start: 2022-12-11 | End: 2022-12-14 | Stop reason: HOSPADM

## 2022-12-11 RX ORDER — BISACODYL 10 MG
10 SUPPOSITORY, RECTAL RECTAL DAILY PRN
Status: DISCONTINUED | OUTPATIENT
Start: 2022-12-11 | End: 2022-12-14 | Stop reason: HOSPADM

## 2022-12-11 RX ORDER — DIPHENHYDRAMINE HYDROCHLORIDE 50 MG/ML
25 INJECTION INTRAMUSCULAR; INTRAVENOUS ONCE
Status: COMPLETED | OUTPATIENT
Start: 2022-12-11 | End: 2022-12-11

## 2022-12-11 RX ORDER — ENOXAPARIN SODIUM 100 MG/ML
40 INJECTION SUBCUTANEOUS EVERY 24 HOURS
Status: DISCONTINUED | OUTPATIENT
Start: 2022-12-11 | End: 2022-12-14 | Stop reason: HOSPADM

## 2022-12-11 RX ORDER — LINEZOLID 600 MG/1
600 TABLET, FILM COATED ORAL EVERY 12 HOURS SCHEDULED
Status: DISCONTINUED | OUTPATIENT
Start: 2022-12-11 | End: 2022-12-12

## 2022-12-11 RX ORDER — DIPHENHYDRAMINE HCL 25 MG
25 CAPSULE ORAL EVERY 6 HOURS PRN
Status: DISCONTINUED | OUTPATIENT
Start: 2022-12-11 | End: 2022-12-14 | Stop reason: HOSPADM

## 2022-12-11 RX ORDER — ONDANSETRON 2 MG/ML
4 INJECTION INTRAMUSCULAR; INTRAVENOUS EVERY 6 HOURS PRN
Status: DISCONTINUED | OUTPATIENT
Start: 2022-12-11 | End: 2022-12-14 | Stop reason: HOSPADM

## 2022-12-11 RX ORDER — POLYETHYLENE GLYCOL 3350 17 G/17G
17 POWDER, FOR SOLUTION ORAL DAILY PRN
Status: DISCONTINUED | OUTPATIENT
Start: 2022-12-11 | End: 2022-12-14 | Stop reason: HOSPADM

## 2022-12-11 RX ORDER — SODIUM CHLORIDE, SODIUM LACTATE, POTASSIUM CHLORIDE, AND CALCIUM CHLORIDE .6; .31; .03; .02 G/100ML; G/100ML; G/100ML; G/100ML
30 INJECTION, SOLUTION INTRAVENOUS ONCE
Status: COMPLETED | OUTPATIENT
Start: 2022-12-11 | End: 2022-12-11

## 2022-12-11 RX ADMIN — DIPHENHYDRAMINE HYDROCHLORIDE 25 MG: 50 INJECTION, SOLUTION INTRAMUSCULAR; INTRAVENOUS at 12:24

## 2022-12-11 RX ADMIN — SODIUM CHLORIDE, POTASSIUM CHLORIDE, SODIUM LACTATE AND CALCIUM CHLORIDE 1710 ML: 600; 310; 30; 20 INJECTION, SOLUTION INTRAVENOUS at 08:38

## 2022-12-11 RX ADMIN — VANCOMYCIN HYDROCHLORIDE 1750 MG: 100 INJECTION, POWDER, LYOPHILIZED, FOR SOLUTION INTRAVENOUS at 10:06

## 2022-12-11 RX ADMIN — CEFTRIAXONE 1000 MG: 1 INJECTION, POWDER, FOR SOLUTION INTRAMUSCULAR; INTRAVENOUS at 08:39

## 2022-12-11 RX ADMIN — ENOXAPARIN SODIUM 40 MG: 100 INJECTION SUBCUTANEOUS at 15:21

## 2022-12-11 RX ADMIN — ACETAMINOPHEN 650 MG: 325 TABLET, FILM COATED ORAL at 21:52

## 2022-12-11 RX ADMIN — MUPIROCIN: 20 OINTMENT TOPICAL at 21:46

## 2022-12-11 RX ADMIN — IOPAMIDOL 100 ML: 755 INJECTION, SOLUTION INTRAVENOUS at 09:31

## 2022-12-11 RX ADMIN — SODIUM CHLORIDE, PRESERVATIVE FREE 10 ML: 5 INJECTION INTRAVENOUS at 20:20

## 2022-12-11 RX ADMIN — FAMOTIDINE 10 MG: 20 TABLET, FILM COATED ORAL at 21:52

## 2022-12-11 RX ADMIN — ONDANSETRON 4 MG: 2 INJECTION INTRAMUSCULAR; INTRAVENOUS at 21:52

## 2022-12-11 RX ADMIN — LINEZOLID 600 MG: 600 TABLET, FILM COATED ORAL at 20:19

## 2022-12-11 RX ADMIN — ALUMINUM HYDROXIDE, MAGNESIUM HYDROXIDE, AND SIMETHICONE 30 ML: 200; 200; 20 SUSPENSION ORAL at 20:30

## 2022-12-11 ASSESSMENT — PAIN DESCRIPTION - FREQUENCY: FREQUENCY: INTERMITTENT

## 2022-12-11 ASSESSMENT — PAIN SCALES - GENERAL
PAINLEVEL_OUTOF10: 0
PAINLEVEL_OUTOF10: 0
PAINLEVEL_OUTOF10: 3

## 2022-12-11 ASSESSMENT — PAIN DESCRIPTION - ONSET: ONSET: PROGRESSIVE

## 2022-12-11 ASSESSMENT — PAIN DESCRIPTION - DESCRIPTORS: DESCRIPTORS: ACHING

## 2022-12-11 ASSESSMENT — ENCOUNTER SYMPTOMS
EYES NEGATIVE: 1
GASTROINTESTINAL NEGATIVE: 1
RESPIRATORY NEGATIVE: 1

## 2022-12-11 ASSESSMENT — PAIN DESCRIPTION - ORIENTATION: ORIENTATION: LOWER

## 2022-12-11 ASSESSMENT — PAIN - FUNCTIONAL ASSESSMENT
PAIN_FUNCTIONAL_ASSESSMENT: NONE - DENIES PAIN
PAIN_FUNCTIONAL_ASSESSMENT: PREVENTS OR INTERFERES SOME ACTIVE ACTIVITIES AND ADLS

## 2022-12-11 ASSESSMENT — PAIN DESCRIPTION - PAIN TYPE: TYPE: ACUTE PAIN

## 2022-12-11 ASSESSMENT — PAIN DESCRIPTION - LOCATION: LOCATION: BACK

## 2022-12-11 NOTE — ED NOTES
TRANSFER - OUT REPORT:    Verbal report given to KAI Garcia on [employer]  being transferred to [facility] 50 30 for routine progression of patient care       Report consisted of patient's Situation, Background, Assessment and   Recommendations(SBAR). Information from the following report(s) Nurse Handoff Report was reviewed with the receiving nurse. [place] Assessment: Presents to emergency department  because of falls (Syncope, seizure, or loss of consciousness): No, Age > 79: No, Altered Mental Status, Intoxication with alcohol or substance confusion (Disorientation, impaired judgment, poor safety awaremess, or inability to follow instructions): No, Impaired Mobility: Ambulates or transfers with assistive devices or assistance; Unable to ambulate or transer.: No  Lines:   Peripheral IV 12/11/22 Right Antecubital (Active)   Site Assessment Clean, dry & intact 12/11/22 0710        Opportunity for questions and clarification was provided.       Patient transported with:  Monitor         Momo cMcormick RN  12/11/22 0306

## 2022-12-11 NOTE — ED TRIAGE NOTES
Pt states she is having urinary tract issues, urinating all the time, burning upon finishing urinating, denies N/V/D, has something on the outside of her abd/leg/vaginal area MD states she will look at it in the room.

## 2022-12-11 NOTE — ED NOTES
Patient is alert and oriented. Patient is ambulatory. Patient is able to verbalize her needs. No respiratory distress. No chest pain. No pain at this time.       Darrel Green RN  12/11/22 6200

## 2022-12-11 NOTE — ED NOTES
Patient is explained of the process from ER to Inpatient. Patient reports of understanding.       Lamberto Johnson RN  12/11/22 3948

## 2022-12-11 NOTE — H&P
Hospitalist History and Physical   Admit Date:  2022  7:12 AM   Name:  Arely Acevedo   Age:  38 y.o. Sex:  female  :  1980   MRN:  [id number]   [facility]    Presenting Complaint: Urinary Frequency     Reason(s) for Admission: UTI (urinary tract infection) with pyuria [N39.0]     History of Present Illness:   Arely Acevedo is a 38 y.o. female with medical history of psoriasis who presented to emergency room with urinary frequency and dysuria. Was seen in the emergency room  symptoms and was given Bactrim without improvement in her symptoms. Also reports that her rash in her left inguinal area has also worsened. Reports having a cyst in the past that was removed by gyn. Denies fever, chills, chest pains, sob. In the ED, she was tachycardic to 140s with EKG showing NSR on arrival but otherwise hemodynamically stable. Laboratory work-up was notable for lactic of 3.7. Urinalysis with large leukocyte esterase negative nitrite, 4+ bacteria. CT abdomen pelvis without any acute abnormalities. Review of Systems:  10 systems reviewed and negative except as noted in HPI. Assessment & Plan:   UTI  Had failed out patient therapy. Start on IV vancomycin but appeared to have pruritis as well as generalized redness so stopped. - start linezolid given prior UA with MRSA  - will give rocephin for gram neg  - follow up Bcx and UCx    Inguinal Cellulitis  Possible folliculitis  - abx as above. - bactroban ointment to affected area    Psoriasis   - has not seen any specialist due to lack of insurance  - will need referral for proper treatment. Diet: ADULT DIET; Regular  VTE ppx: lovenox  Code status: Full Code    Hospital Problems:  Principal Problem:    UTI (urinary tract infection) with pyuria  Active Problems:    Psoriasis    Cellulitis  Resolved Problems:    * No resolved hospital problems.  *       Past History:     Past Medical History:   Diagnosis Date    Arthritis Psoriasis        Past Surgical History:   Procedure Laterality Date    GYN          ORTHOPEDIC SURGERY      cyst behind right knee        Social History     Tobacco Use    Smoking status: Every Day     Packs/day: 1.00     Types: Cigarettes    Smokeless tobacco: Never   Substance Use Topics    Alcohol use: No      Social History     Substance and Sexual Activity   Drug Use No     Family History: DM in mother      There is no immunization history on file for this patient. Allergies   Allergen Reactions    Prednisone Itching    Vancomycin Itching and Rash     Prior to Admit Medications:  Current Outpatient Medications   Medication Instructions    fluocinolone (SYNALAR) 0.025 % ointment Topical, 2 TIMES DAILY    indomethacin (INDOCIN) 50 mg, Oral, 2 TIMES DAILY WITH MEALS    miconazole (MICOTIN) 2 % cream Apply topically 2 times daily. phenazopyridine (PYRIDIUM) 95 mg, Oral, 3 TIMES DAILY PRN         Objective:   Patient Vitals for the past 24 hrs:   Temp Pulse Resp BP SpO2   22 1644 -- 90 -- -- --   22 1315 -- -- -- -- 98 %   22 1300 -- -- -- (!) 127/91 98 %   22 1245 -- -- -- -- 96 %   22 1230 -- -- -- (!) 129/90 98 %   22 1212 -- -- -- (!) 138/92 98 %   22 1131 -- -- -- 114/89 100 %   22 1015 -- -- -- -- 96 %   22 1000 -- -- -- 126/66 98 %   22 0915 -- -- -- (!) 143/112 95 %   22 0900 -- -- -- 136/89 100 %   22 0845 -- -- -- (!) 137/108 98 %   22 0700 98 °F (36.7 °C) (!) 134 18 (!) 144/101 97 %       Oxygen Therapy  SpO2: 98 %  Pulse via Oximetry: 93 beats per minute  O2 Device: None (Room air)    Estimated body mass index is 27.46 kg/m² as calculated from the following:    Height as of this encounter: 5' 5\" (1.651 m). Weight as of this encounter: 165 lb (74.8 kg).   No intake or output data in the 24 hours ending 22 1656      Physical Exam:    Blood pressure (!) 127/91, pulse 90, temperature 98 °F (36.7 °C), temperature source Oral, resp. rate 18, height 5' 5\" (1.651 m), weight 165 lb (74.8 kg), SpO2 98 %. General:    Well nourished. Slightly flushed, pruritis   Head:  Normocephalic, atraumatic  Eyes:  Sclerae appear normal.  Pupils equally round. ENT:  Nares appear normal, no drainage. Moist oral mucosa  Neck:  No restricted ROM. Trachea midline   CV:   RRR. No m/r/g. No jugular venous distension. Lungs:   CTAB. No wheezing. Symmetric expansion. Abdomen: Bowel sounds present. Soft, nontender, nondistended. Extremities: No cyanosis or clubbing. No edema  Skin:     No rashes and normal coloration. Warm and dry. Psoriatic rash through out her body. Left inguinal area with redness and one small lesion with clear fluid drainage. Neuro:  CN II-XII grossly intact. A&Ox3  Psych:  Normal mood and affect.       I have personally reviewed labs and tests showing:  Recent Labs:  Recent Results (from the past 24 hour(s))   Comprehensive Metabolic Panel    Collection Time: 12/11/22  7:07 AM   Result Value Ref Range    Sodium 138 133 - 143 mmol/L    Potassium 4.0 3.5 - 5.1 mmol/L    Chloride 108 101 - 110 mmol/L    CO2 23 21 - 32 mmol/L    Anion Gap 7 2 - 11 mmol/L    Glucose 144 (H) 65 - 100 mg/dL    BUN 8 6 - 23 MG/DL    Creatinine 0.90 0.6 - 1.0 MG/DL    Est, Glom Filt Rate >60 >60 ml/min/1.73m2    Calcium 8.9 8.3 - 10.4 MG/DL    Total Bilirubin 0.3 0.2 - 1.1 MG/DL    ALT 21 12 - 65 U/L    AST 14 (L) 15 - 37 U/L    Alk Phosphatase 99 50 - 136 U/L    Total Protein 8.6 (H) 6.3 - 8.2 g/dL    Albumin 3.5 3.5 - 5.0 g/dL    Globulin 5.1 (H) 2.8 - 4.5 g/dL    Albumin/Globulin Ratio 0.7 0.4 - 1.6     CBC with Auto Differential    Collection Time: 12/11/22  7:07 AM   Result Value Ref Range    WBC 10.6 4.3 - 11.1 K/uL    RBC 4.91 4.05 - 5.2 M/uL    Hemoglobin 14.3 11.7 - 15.4 g/dL    Hematocrit 44.2 35.8 - 46.3 %    MCV 90.0 82 - 102 FL    MCH 29.1 26.1 - 32.9 PG    MCHC 32.4 31.4 - 35.0 g/dL    RDW 13.9 11.9 - 14.6 %    Platelets 923 132 - 493 K/uL    MPV 10.0 9.4 - 12.3 FL    nRBC 0.00 0.0 - 0.2 K/uL    Differential Type AUTOMATED      Seg Neutrophils 69 43 - 78 %    Lymphocytes 17 13 - 44 %    Monocytes 9 4.0 - 12.0 %    Eosinophils % 3 0.5 - 7.8 %    Basophils 1 0.0 - 2.0 %    Immature Granulocytes 1 0.0 - 5.0 %    Segs Absolute 7.3 1.7 - 8.2 K/UL    Absolute Lymph # 1.8 0.5 - 4.6 K/UL    Absolute Mono # 1.0 0.1 - 1.3 K/UL    Absolute Eos # 0.3 0.0 - 0.8 K/UL    Basophils Absolute 0.1 0.0 - 0.2 K/UL    Absolute Immature Granulocyte 0.1 0.0 - 0.5 K/UL   Procalcitonin    Collection Time: 12/11/22  7:07 AM   Result Value Ref Range    Procalcitonin <0.05 0.00 - 0.49 ng/mL   Lactate, Sepsis    Collection Time: 12/11/22  7:47 AM   Result Value Ref Range    Lactic Acid, Sepsis 3.7 (HH) 0.4 - 2.0 MMOL/L   Urinalysis    Collection Time: 12/11/22  7:50 AM   Result Value Ref Range    Color, UA YELLOW/STRAW      Appearance CLOUDY      Specific [place], UA 1.012 1.001 - 1.023      pH, Urine 6.0 5.0 - 9.0      Protein, UA Negative NEG mg/dL    Glucose, UA Negative mg/dL    Ketones, Urine TRACE (A) NEG mg/dL    Bilirubin Urine Negative NEG      Blood, Urine Negative NEG      Urobilinogen, Urine 0.2 0.2 - 1.0 EU/dL    Nitrite, Urine Negative NEG      Leukocyte Esterase, Urine LARGE (A) NEG      WBC, UA  (A) U4 /hpf    RBC, UA 0-5 U5 /hpf    Epithelial Cells UA 10-20 (A) U5 /hpf    BACTERIA, URINE 4+ (A) NEG /hpf    Casts 0-2 U2 /lpf   Urine Preg (Lab)    Collection Time: 12/11/22  7:50 AM   Result Value Ref Range    HCG(Urine) Pregnancy Test Negative NEG     EKG 12 Lead    Collection Time: 12/11/22  7:56 AM   Result Value Ref Range    Ventricular Rate 98 BPM    Atrial Rate 99 BPM    P-R Interval 144 ms    QRS Duration 77 ms    Q-T Interval 323 ms    QTc Calculation (Bazett) 413 ms    P Axis 72 degrees    R Axis 53 degrees    T Axis 60 degrees    Diagnosis Sinus rhythm    Lactate, Sepsis    Collection Time: 12/11/22 10:06 AM Result Value Ref Range    Lactic Acid, Sepsis 1.4 0.4 - 2.0 MMOL/L       I have personally reviewed imaging studies showing:  CT ABDOMEN PELVIS W IV CONTRAST Additional Contrast? None    Result Date: 12/11/2022  CT OF THE ABDOMEN AND PELVIS INDICATION: Dysuria Multiple axial images were obtained through the abdomen and pelvis. Oral contrast was used for bowel opacification. 100mL of Isovue 370 intravenous contrast was used for better evaluation of solid organs and vascular structures. Radiation dose reduction techniques were used for this study. All CT scans performed at this facility use one or all of the following: Automated exposure control, adjustment of the mA and/or kVp according to patient's size, iterative reconstruction. COMPARISON: 07/12/2017 FINDINGS: - LUNG BASES: No infiltrates or masses. - LIVER: Normal in size and appearance. - GALLBLADDER/BILE DUCTS: No gallstones or bile duct dilatation. - PANCREAS: Normal. - SPLEEN: Normal. - ADRENALS: Normal. - KIDNEYS/URETERS: No hydronephrosis or significant mass. - BLADDER: Normal. - REPRODUCTIVE ORGANS: No pelvic masses. - BOWEL: Normal caliber. No inflammatory changes. The appendix is normal in size. - LYMPH NODES: No significant retroperitoneal, mesenteric, or pelvic adenopathy. - BONES: No fracture or significant bone lesion. - VASCULATURE: Normal - OTHER: No ascites. No acute findings in abdomen or pelvis If there are any questions about this report, I can be reached on [employer] or at [phone]     XR CHEST PORTABLE    Result Date: 12/11/2022  Chest X-ray INDICATION: Sepsis AP/PA view of the chest was obtained. FINDINGS: The lungs are clear. There are no infiltrates or effusions. The heart size is normal.  The bony thorax is intact. No acute findings in the chest       Echocardiogram:  No results found for this or any previous visit.         Orders Placed This Encounter   Medications    lactated ringers bolus    cefTRIAXone (ROCEPHIN) 1,000 mg in sodium chloride 0.9 % 50 mL IVPB mini-bag     Order Specific Question:   Antimicrobial Indications     Answer:   Urinary Tract Infection    vancomycin (VANCOCIN) 1750 mg in sodium chloride 0.9 % 500 mL IVPB     Order Specific Question:   Antimicrobial Indications     Answer:   Sepsis of Unknown Etiology    iopamidol (ISOVUE-370) 76 % injection 100 mL    diphenhydrAMINE (BENADRYL) capsule 25 mg    sodium chloride flush 0.9 % injection 5-40 mL    sodium chloride flush 0.9 % injection 5-40 mL    0.9 % sodium chloride infusion    OR Linked Order Group     ondansetron (ZOFRAN-ODT) disintegrating tablet 4 mg     ondansetron (ZOFRAN) injection 4 mg    polyethylene glycol (GLYCOLAX) packet 17 g    bisacodyl (DULCOLAX) suppository 10 mg    famotidine (PEPCID) tablet 10 mg    aluminum & magnesium hydroxide-simethicone (MAALOX) 200-200-20 MG/5ML suspension 30 mL    OR Linked Order Group     acetaminophen (TYLENOL) tablet 650 mg     acetaminophen (TYLENOL) suppository 650 mg    enoxaparin (LOVENOX) injection 40 mg     Hold if platelets < 67B, or Hb < 7     Order Specific Question:   Indication of Use     Answer:   Prophylaxis-DVT/PE    cefTRIAXone (ROCEPHIN) 1,000 mg in sodium chloride 0.9 % 50 mL IVPB mini-bag     Order Specific Question:   Antimicrobial Indications     Answer:   Urinary Tract Infection     Order Specific Question:   UTI duration of therapy     Answer:   5 days    DISCONTD: vancomycin (VANCOCIN) 1,000 mg in sodium chloride 0.9 % 250 mL IVPB ([id number])     Order Specific Question:   Antimicrobial Indications     Answer:   Urinary Tract Infection     Order Specific Question:   UTI duration of therapy     Answer:   5 days    DISCONTD: vancomycin (VANCOCIN) 1250 mg in sodium chloride 0.9% 250 mL IVPB     Order Specific Question:   Antimicrobial Indications     Answer:   Urinary Tract Infection     Order Specific Question:   UTI duration of therapy     Answer:   5 days    diphenhydrAMINE (BENADRYL) injection 25 mg    linezolid (ZYVOX) tablet 600 mg     Possible reaction to vancomycin. Order Specific Question:   Antimicrobial Indications     Answer:   Urinary Tract Infection     Order Specific Question:   UTI duration of therapy     Answer:   5 days    mupirocin (BACTROBAN) 2 % ointment         Signed:  Schuyler Lopes MD    Part of this note may have been written by using a voice dictation software. The note has been proof read but may still contain some grammatical/other typographical errors.

## 2022-12-11 NOTE — ED PROVIDER NOTES
Emergency Department Provider Note                   PCP:                Md Phong Freed               Age: 38 y.o. Sex: female     No diagnosis found. DISPOSITION         MDM  Number of Diagnoses or Management Options  Cellulitis, unspecified cellulitis site  Diagnosis management comments: 73-year-old female initially arrived with statement of concern for UTI and also mention that she has a rash that she wanted evaluated. However on upon arrival her heart rate was 140. This prompted a septic order set including a lactic acid. Lactic acid also returned positive at 3.7. On my physical exam she does have a diffuse erythematous rash with induration along the left labial wall with no obvious area for incision and drainage. Also with an overlying cellulitis did not feel comfortable even attempting an I&D also due to the nature of the anatomical location. A CT was obtained to look for gas or abscess however CT did not show any acute findings. Patient did receive blood cultures along with vancomycin and Rocephin along with 30 cc/kg bolus. ,  For ideal body weight, due to this abnormal heart rate lactic acid patient will be admitted.   Dr. Mandy Nguyen who agrees with admission               Orders Placed This Encounter   Procedures    Blood Culture 1    Blood Culture 2    Culture, Urine    XR CHEST PORTABLE    CT ABDOMEN PELVIS W IV CONTRAST Additional Contrast? None    Comprehensive Metabolic Panel    CBC with Auto Differential    Lactate, Sepsis    Procalcitonin    Urinalysis    Neurologic Status Assessment    Strict intake and output    Vital Signs Per Unit Routine    EKG 12 Lead    Saline lock IV        Medications   lactated ringers bolus (has no administration in time range)   cefTRIAXone (ROCEPHIN) 1,000 mg in sodium chloride 0.9 % 50 mL IVPB mini-bag (has no administration in time range)   vancomycin (VANCOCIN) 1750 mg in sodium chloride 0.9 % 500 mL IVPB (has no administration in time range)   iopamidol (ISOVUE-370) 76 % injection 100 mL (has no administration in time range)       New Prescriptions    No medications on file        Mau Fang is a 38 y.o. female who presents to the Emergency Department with chief complaint of    Chief Complaint   Patient presents with    Urinary Frequency      49-year-old female presenting with concern for urinary tract infection and skin rash in her groin. She states that she has had this intermittently for some time. She is currently been on Bactrim for urinary tract infection and for this skin rash. She states the skin rash is getting worse and is becoming painful and oozing. She describes it in the inguinal area next to the labia. Denies any vaginal discharge. She describes her urination as change in urination with some discomfort at the very end of the urethra. Denies any back pain, abdominal pain, fevers, nausea or vomiting. All other systems reviewed and are negative unless otherwise stated in the history of present illness section. Review of Systems   HENT: Negative. Eyes: Negative. Respiratory: Negative. Cardiovascular: Negative. Gastrointestinal: Negative. Genitourinary:  Positive for dysuria. Musculoskeletal: Negative. Skin:  Positive for rash. Neurological: Negative. Psychiatric/Behavioral: Negative. Past Medical History:   Diagnosis Date    Arthritis     Psoriasis         Past Surgical History:   Procedure Laterality Date    GYN          ORTHOPEDIC SURGERY      cyst behind right knee        History reviewed. No pertinent family history.      Social History     Socioeconomic History    Marital status: Single     Spouse name: None    Number of children: None    Years of education: None    Highest education level: None   Tobacco Use    Smoking status: Every Day     Packs/day: 1.00     Types: Cigarettes    Smokeless tobacco: Never   Substance and Sexual Activity    Alcohol use: No    Drug use: No        Allergies: Prednisone    Previous Medications    FLUOCINOLONE (SYNALAR) 0.025 % OINTMENT    Apply topically 2 times daily    INDOMETHACIN (INDOCIN) 50 MG CAPSULE    Take 1 capsule by mouth 2 times daily (with meals)    MICONAZOLE (MICOTIN) 2 % CREAM    Apply topically 2 times daily. PHENAZOPYRIDINE (PYRIDIUM) 95 MG TABLET    Take 95 mg by mouth 3 times daily as needed        Vitals signs and nursing note reviewed. Patient Vitals for the past 4 hrs:   Temp Pulse Resp BP SpO2   12/11/22 0700 98 °F (36.7 °C) (!) 134 18 (!) 144/101 97 %          Physical Exam  Constitutional:       General: She is not in acute distress. Appearance: Normal appearance. She is not ill-appearing. HENT:      Head: Normocephalic and atraumatic. Nose: No rhinorrhea. Mouth/Throat:      Mouth: Mucous membranes are moist.   Eyes:      Extraocular Movements: Extraocular movements intact. Cardiovascular:      Rate and Rhythm: Normal rate and regular rhythm. Pulmonary:      Effort: Pulmonary effort is normal.      Breath sounds: Normal breath sounds. Abdominal:      General: Abdomen is flat. Bowel sounds are normal. There is no distension. Palpations: Abdomen is soft. Tenderness: There is no abdominal tenderness. Genitourinary:     Comments: External  exam performed with female nurse chaperone: There is erythema throughout the entirety of the inguinal region bilaterally throughout the labia. No crepitus but there is induration lateral to the left labia. There is a serous discharge. Multiple satellite lesions around the remaining skin rash. Musculoskeletal:         General: Normal range of motion. Cervical back: Normal range of motion. Skin:     General: Skin is warm and dry. Neurological:      General: No focal deficit present. Mental Status: She is alert.    Psychiatric:         Mood and Affect: Mood normal.        Procedures    ED EKG Interpretation  EKG was interpreted in the absence of a cardiologist.    Rate: 100  EKG Interpretation: EKG Interpretation: sinus rhythm  ST Segments: Normal ST segments - NO STEMI    Results for orders placed or performed during the hospital encounter of 12/11/22   XR CHEST PORTABLE    Narrative    Chest X-ray    INDICATION: Sepsis    AP/PA view of the chest was obtained. FINDINGS: The lungs are clear. There are no infiltrates or effusions. The heart  size is normal.  The bony thorax is intact.         Impression    No acute findings in the chest     Comprehensive Metabolic Panel   Result Value Ref Range    Sodium 138 133 - 143 mmol/L    Potassium 4.0 3.5 - 5.1 mmol/L    Chloride 108 101 - 110 mmol/L    CO2 23 21 - 32 mmol/L    Anion Gap 7 2 - 11 mmol/L    Glucose 144 (H) 65 - 100 mg/dL    BUN 8 6 - 23 MG/DL    Creatinine 0.90 0.6 - 1.0 MG/DL    Est, Glom Filt Rate >60 >60 ml/min/1.73m2    Calcium 8.9 8.3 - 10.4 MG/DL    Total Bilirubin 0.3 0.2 - 1.1 MG/DL    ALT 21 12 - 65 U/L    AST 14 (L) 15 - 37 U/L    Alk Phosphatase 99 50 - 136 U/L    Total Protein 8.6 (H) 6.3 - 8.2 g/dL    Albumin 3.5 3.5 - 5.0 g/dL    Globulin 5.1 (H) 2.8 - 4.5 g/dL    Albumin/Globulin Ratio 0.7 0.4 - 1.6     CBC with Auto Differential   Result Value Ref Range    WBC 10.6 4.3 - 11.1 K/uL    RBC 4.91 4.05 - 5.2 M/uL    Hemoglobin 14.3 11.7 - 15.4 g/dL    Hematocrit 44.2 35.8 - 46.3 %    MCV 90.0 82 - 102 FL    MCH 29.1 26.1 - 32.9 PG    MCHC 32.4 31.4 - 35.0 g/dL    RDW 13.9 11.9 - 14.6 %    Platelets 585 042 - 850 K/uL    MPV 10.0 9.4 - 12.3 FL    nRBC 0.00 0.0 - 0.2 K/uL    Differential Type AUTOMATED      Seg Neutrophils 69 43 - 78 %    Lymphocytes 17 13 - 44 %    Monocytes 9 4.0 - 12.0 %    Eosinophils % 3 0.5 - 7.8 %    Basophils 1 0.0 - 2.0 %    Immature Granulocytes 1 0.0 - 5.0 %    Segs Absolute 7.3 1.7 - 8.2 K/UL    Absolute Lymph # 1.8 0.5 - 4.6 K/UL    Absolute Mono # 1.0 0.1 - 1.3 K/UL    Absolute Eos # 0.3 0.0 - 0.8 K/UL    Basophils Absolute 0.1 0.0 - 0.2 K/UL    Absolute Immature Granulocyte 0.1 0.0 - 0.5 K/UL   Procalcitonin   Result Value Ref Range    Procalcitonin <0.05 0.00 - 0.49 ng/mL   EKG 12 Lead   Result Value Ref Range    Ventricular Rate 98 BPM    Atrial Rate 99 BPM    P-R Interval 144 ms    QRS Duration 77 ms    Q-T Interval 323 ms    QTc Calculation (Bazett) 413 ms    P Axis 72 degrees    R Axis 53 degrees    T Axis 60 degrees    Diagnosis Sinus rhythm         XR CHEST PORTABLE   Final Result   No acute findings in the chest         CT ABDOMEN PELVIS W IV CONTRAST Additional Contrast? None    (Results Pending)                         Voice dictation software was used during the making of this note. This software is not perfect and grammatical and other typographical errors may be present. This note has not been completely proofread for errors.      Irma Rodríguez MD  12/11/22 0148

## 2022-12-11 NOTE — ED NOTES
Stopped vancomycin. Patient informed of feeling itchy. benedryl given. MD. Dr. [place] was notified.       Shannan Lynn RN  12/11/22 7659

## 2022-12-11 NOTE — PROGRESS NOTES
VANCO DAILY FOLLOW UP NOTE  [address] [facility] Pharmacokinetic Monitoring Service - Vancomycin    Consulting Provider: Dr. Army Lobo   Indication: UTI  Target Concentration: Goal trough of 10-15 mg/L and AUC/RUBI <500 mg*hr/L  Day of Therapy: 1 of 5  Additional Antimicrobials: ceftriaxone    Patient eligible for piperacillin-tazobactam to cefepime auto-substitution per P&T approved protocol? N/A    Pertinent Laboratory Values: Wt Readings from Last 1 Encounters:   12/11/22 165 lb (74.8 kg)     Temp Readings from Last 1 Encounters:   12/11/22 98 °F (36.7 °C) (Oral)     Recent Labs     12/11/22  0707   BUN 8   CREATININE 0.90   WBC 10.6   PROCAL <0.05     Estimated Creatinine Clearance: 81 mL/min (based on SCr of 0.9 mg/dL). No results found for: Melchor Brown    MRSA Nasal Swab: N/A. Non-respiratory infection.     Assessment:  Date/Time Dose Concentration AUC         Note: Serum concentrations collected for AUC dosing may appear elevated if collected in close proximity to the dose administered, this is not necessarily an indication of toxicity    Plan:  Dosing recommendations based on Bayesian software  Start vancomycin 1250 mg q18h following 1750 mg loading dose  Anticipated AUC of 451 and trough concentration of 12.6 at steady state  Renal labs as indicated   Vancomycin concentrations will be ordered as clinically appropriate   Pharmacy will continue to monitor patient and adjust therapy as indicated    Thank you for the consult,  Kacey Johns [facility]

## 2022-12-12 LAB
ANION GAP SERPL CALC-SCNC: 5 MMOL/L (ref 2–11)
BASOPHILS # BLD: 0.1 K/UL (ref 0–0.2)
BASOPHILS NFR BLD: 1 % (ref 0–2)
BUN SERPL-MCNC: 10 MG/DL (ref 6–23)
CALCIUM SERPL-MCNC: 8.3 MG/DL (ref 8.3–10.4)
CHLORIDE SERPL-SCNC: 111 MMOL/L (ref 101–110)
CO2 SERPL-SCNC: 25 MMOL/L (ref 21–32)
CREAT SERPL-MCNC: 0.7 MG/DL (ref 0.6–1)
DIFFERENTIAL METHOD BLD: ABNORMAL
EOSINOPHIL # BLD: 0.4 K/UL (ref 0–0.8)
EOSINOPHIL NFR BLD: 4 % (ref 0.5–7.8)
ERYTHROCYTE [DISTWIDTH] IN BLOOD BY AUTOMATED COUNT: 14.1 % (ref 11.9–14.6)
GLUCOSE SERPL-MCNC: 89 MG/DL (ref 65–100)
HCT VFR BLD AUTO: 42.2 % (ref 35.8–46.3)
HGB BLD-MCNC: 13.1 G/DL (ref 11.7–15.4)
IMM GRANULOCYTES # BLD AUTO: 0.1 K/UL (ref 0–0.5)
IMM GRANULOCYTES NFR BLD AUTO: 1 % (ref 0–5)
LYMPHOCYTES # BLD: 1.6 K/UL (ref 0.5–4.6)
LYMPHOCYTES NFR BLD: 16 % (ref 13–44)
MCH RBC QN AUTO: 28.9 PG (ref 26.1–32.9)
MCHC RBC AUTO-ENTMCNC: 31 G/DL (ref 31.4–35)
MCV RBC AUTO: 93.2 FL (ref 82–102)
MONOCYTES # BLD: 1.1 K/UL (ref 0.1–1.3)
MONOCYTES NFR BLD: 10 % (ref 4–12)
NEUTS SEG # BLD: 7 K/UL (ref 1.7–8.2)
NEUTS SEG NFR BLD: 68 % (ref 43–78)
NRBC # BLD: 0 K/UL (ref 0–0.2)
PLATELET # BLD AUTO: 339 K/UL (ref 150–450)
PMV BLD AUTO: 10.2 FL (ref 9.4–12.3)
POTASSIUM SERPL-SCNC: 4.3 MMOL/L (ref 3.5–5.1)
RBC # BLD AUTO: 4.53 M/UL (ref 4.05–5.2)
SODIUM SERPL-SCNC: 141 MMOL/L (ref 133–143)
WBC # BLD AUTO: 10.1 K/UL (ref 4.3–11.1)

## 2022-12-12 PROCEDURE — 6370000000 HC RX 637 (ALT 250 FOR IP): Performed by: INTERNAL MEDICINE

## 2022-12-12 PROCEDURE — 80048 BASIC METABOLIC PNL TOTAL CA: CPT

## 2022-12-12 PROCEDURE — 1100000000 HC RM PRIVATE

## 2022-12-12 PROCEDURE — 6360000002 HC RX W HCPCS: Performed by: INTERNAL MEDICINE

## 2022-12-12 PROCEDURE — 2580000003 HC RX 258: Performed by: INTERNAL MEDICINE

## 2022-12-12 PROCEDURE — 85025 COMPLETE CBC W/AUTO DIFF WBC: CPT

## 2022-12-12 PROCEDURE — 36415 COLL VENOUS BLD VENIPUNCTURE: CPT

## 2022-12-12 RX ORDER — LINEZOLID 600 MG/1
600 TABLET, FILM COATED ORAL EVERY 12 HOURS SCHEDULED
Status: DISCONTINUED | OUTPATIENT
Start: 2022-12-12 | End: 2022-12-14 | Stop reason: HOSPADM

## 2022-12-12 RX ORDER — SULFAMETHOXAZOLE AND TRIMETHOPRIM 800; 160 MG/1; MG/1
1 TABLET ORAL EVERY 12 HOURS SCHEDULED
Status: DISCONTINUED | OUTPATIENT
Start: 2022-12-12 | End: 2022-12-12

## 2022-12-12 RX ADMIN — CEFTRIAXONE 1000 MG: 1 INJECTION, POWDER, FOR SOLUTION INTRAMUSCULAR; INTRAVENOUS at 09:35

## 2022-12-12 RX ADMIN — MUPIROCIN: 20 OINTMENT TOPICAL at 21:00

## 2022-12-12 RX ADMIN — ACETAMINOPHEN 650 MG: 325 TABLET, FILM COATED ORAL at 20:57

## 2022-12-12 RX ADMIN — ENOXAPARIN SODIUM 40 MG: 100 INJECTION SUBCUTANEOUS at 16:12

## 2022-12-12 RX ADMIN — SODIUM CHLORIDE, PRESERVATIVE FREE 10 ML: 5 INJECTION INTRAVENOUS at 09:36

## 2022-12-12 RX ADMIN — FAMOTIDINE 10 MG: 20 TABLET, FILM COATED ORAL at 20:58

## 2022-12-12 RX ADMIN — SODIUM CHLORIDE, PRESERVATIVE FREE 10 ML: 5 INJECTION INTRAVENOUS at 20:58

## 2022-12-12 RX ADMIN — LINEZOLID 600 MG: 600 TABLET, FILM COATED ORAL at 09:35

## 2022-12-12 RX ADMIN — MUPIROCIN: 20 OINTMENT TOPICAL at 09:36

## 2022-12-12 RX ADMIN — LINEZOLID 600 MG: 600 TABLET, FILM COATED ORAL at 20:58

## 2022-12-12 RX ADMIN — DIPHENHYDRAMINE HYDROCHLORIDE 25 MG: 25 CAPSULE ORAL at 20:57

## 2022-12-12 ASSESSMENT — PAIN DESCRIPTION - PAIN TYPE: TYPE: ACUTE PAIN

## 2022-12-12 ASSESSMENT — PAIN DESCRIPTION - FREQUENCY: FREQUENCY: CONTINUOUS

## 2022-12-12 ASSESSMENT — PAIN DESCRIPTION - DESCRIPTORS: DESCRIPTORS: ACHING;SORE;ITCHING

## 2022-12-12 ASSESSMENT — PAIN SCALES - GENERAL
PAINLEVEL_OUTOF10: 2
PAINLEVEL_OUTOF10: 3
PAINLEVEL_OUTOF10: 1
PAINLEVEL_OUTOF10: 0

## 2022-12-12 ASSESSMENT — PAIN DESCRIPTION - ORIENTATION: ORIENTATION: LOWER;POSTERIOR

## 2022-12-12 ASSESSMENT — PAIN DESCRIPTION - ONSET: ONSET: ON-GOING

## 2022-12-12 ASSESSMENT — PAIN DESCRIPTION - LOCATION: LOCATION: BACK

## 2022-12-12 NOTE — CONSULTS
Consult Note  Laura Harrison  MRN: [id number]  :1980  Age:42 y.o.    HPI: Laura Harrison is a 38 y.o. female who we are asked in consultation by Dr. Mariana Horan (hospitalist)  to see for questionable left inguinal abscess. The patient has a PMHx of psoriasis and arthritis. Pt was seen and discharged from the Ed 22 for left groin cellulitis. Pt completed a course of bactrim after 22 ED presentation with some resolution of left groin cellulitis but over the past few days (prior to her ED presentation 22) cellulitis has worsened and she developed dysuria and urinary frequency. Of note, pt had approximately 7 bedside groin I&Ds by her GYN from age [age]; however, over the past 12 years she has not experienced groin cellulitis. In the ED 22, she was tachycardic to 140s  on arrival but otherwise hemodynamically stable. Follow up EKG in ED 22 showed:NSR(HR=98)  Laboratory work-up was notable for lactic of 3.7  07:47 with interval lactic acid 1.4  10:06     Urinalysis with large leukocyte esterase negative nitrite, 4+ bacteria. CT abdomen pelvis without any acute abnormalities. She was admitted on 2022 for UTI and left groin cellulitis with lactic acidosis.        Pt with regular diet at time of consult  Pt not taking anticoagulation    Past Medical History:   Diagnosis Date    Arthritis     Psoriasis      Past Surgical History:   Procedure Laterality Date    GYN          ORTHOPEDIC SURGERY      cyst behind right knee     Current Facility-Administered Medications   Medication Dose Route Frequency    linezolid (ZYVOX) tablet 600 mg  600 mg Oral 2 times per day    diphenhydrAMINE (BENADRYL) capsule 25 mg  25 mg Oral Q6H PRN    sodium chloride flush 0.9 % injection 5-40 mL  5-40 mL IntraVENous 2 times per day    sodium chloride flush 0.9 % injection 5-40 mL  5-40 mL IntraVENous PRN    0.9 % sodium chloride infusion   IntraVENous PRN    ondansetron (ZOFRAN-ODT) disintegrating tablet 4 mg  4 mg Oral Q8H PRN    Or    ondansetron (ZOFRAN) injection 4 mg  4 mg IntraVENous Q6H PRN    polyethylene glycol (GLYCOLAX) packet 17 g  17 g Oral Daily PRN    bisacodyl (DULCOLAX) suppository 10 mg  10 mg Rectal Daily PRN    famotidine (PEPCID) tablet 10 mg  10 mg Oral Daily PRN    aluminum & magnesium hydroxide-simethicone (MAALOX) 200-200-20 MG/5ML suspension 30 mL  30 mL Oral Q6H PRN    acetaminophen (TYLENOL) tablet 650 mg  650 mg Oral Q6H PRN    Or    acetaminophen (TYLENOL) suppository 650 mg  650 mg Rectal Q6H PRN    enoxaparin (LOVENOX) injection 40 mg  40 mg SubCUTAneous Q24H    cefTRIAXone (ROCEPHIN) 1,000 mg in sodium chloride 0.9 % 50 mL IVPB mini-bag  1,000 mg IntraVENous Daily    mupirocin (BACTROBAN) 2 % ointment   Topical BID     Prednisone and Vancomycin  Social History     Socioeconomic History    Marital status: Single     Spouse name: None    Number of children: None    Years of education: None    Highest education level: None   Tobacco Use    Smoking status: Every Day     Packs/day: 1.00     Types: Cigarettes    Smokeless tobacco: Never   Substance and Sexual Activity    Alcohol use: No    Drug use: No     Social History     Tobacco Use   Smoking Status Every Day    Packs/day: 1.00    Types: Cigarettes   Smokeless Tobacco Never     History reviewed. No pertinent family history. ROS: The patient has no difficulty with chest pain or shortness of breath. No fever or chills. Comprehensive review of systems was otherwise unremarkable except as noted above. Physical Exam:   BP (!) 124/91   Pulse 65   Temp 98.4 °F (36.9 °C) (Oral)   Resp 18   Ht 5' 5\" (1.651 m)   Wt 165 lb (74.8 kg)   SpO2 98%   BMI 27.46 kg/m²   Constitutional: Alert, oriented, cooperative patient in no acute distress; appears stated age    Eyes:Sclera are clear.  EOMs intact  ENMT: no external lesions gross hearing normal; no obvious neck masses, no ear or lip lesions, nares normal  CV: RRR. Normal perfusion  Resp: No JVD. Breathing is  non-labored; no audible wheezing. GI: soft and non-distended     Musculoskeletal: unremarkable with normal function. No embolic signs or cyanosis. Neuro:  Oriented; moves all 4; no focal deficits  Psychiatric: normal affect and mood, no memory impairment  Skin: noted psoriasis to RUE, RLE, and LUE. Left inguinal crease with cellulitis. Erythema noted directly in the left inguinal crease with approximately 0.5 cm extension of erythema medially and laterally. Noted 2.5 cm x 2.5 cm palpable thickness mid inguinal crease  with no fluctuance. Copious amount of mupirocin ointment noted to left inguinal fold. Picture of left inguinal crease uploaded to pt chart- media.    Recent vitals (if inpt):  Patient Vitals for the past 24 hrs:   BP Temp Temp src Pulse Resp SpO2   12/12/22 1147 (!) 124/91 98.4 °F (36.9 °C) Oral 65 18 98 %   12/12/22 0746 115/70 -- -- 61 -- 97 %   12/12/22 0744 115/70 99.7 °F (37.6 °C) Oral 65 18 96 %   12/12/22 0255 102/72 98.2 °F (36.8 °C) Oral 68 18 98 %   12/12/22 0023 126/88 98 °F (36.7 °C) Oral 70 17 99 %   12/11/22 1905 134/81 97.9 °F (36.6 °C) Axillary 71 19 100 %   12/11/22 1753 119/84 97.7 °F (36.5 °C) Oral 71 14 99 %   12/11/22 1644 -- -- -- 90 -- --   12/11/22 1315 -- -- -- -- -- 98 %   12/11/22 1300 (!) 127/91 -- -- -- -- 98 %       Labs:  Recent Labs     12/11/22  0707 12/12/22  0602   WBC 10.6 10.1   HGB 14.3 13.1    339    141   K 4.0 4.3    111*   CO2 23 25   BUN 8 10   ALT 21  --        Lab Results   Component Value Date/Time    WBC 10.1 12/12/2022 06:02 AM    HGB 13.1 12/12/2022 06:02 AM     12/12/2022 06:02 AM     12/12/2022 06:02 AM    K 4.3 12/12/2022 06:02 AM     12/12/2022 06:02 AM    CO2 25 12/12/2022 06:02 AM    BUN 10 12/12/2022 06:02 AM    ALT 21 12/11/2022 07:07 AM     CT OF THE ABDOMEN AND PELVIS 12/11/22 09:35       INDICATION: Dysuria       Multiple axial images were obtained through the abdomen and pelvis. Oral   contrast was used for bowel opacification. 100mL of Isovue 370 intravenous   contrast was used for better evaluation of solid organs and vascular structures. Radiation dose reduction techniques were used for this study. All CT scans   performed at this facility use one or all of the following: Automated exposure   control, adjustment of the mA and/or kVp according to patient's size, iterative   reconstruction. COMPARISON: 07/12/2017       FINDINGS:   - LUNG BASES: No infiltrates or masses. - LIVER: Normal in size and appearance. - GALLBLADDER/BILE DUCTS: No gallstones or bile duct dilatation.   - PANCREAS: Normal.   - SPLEEN: Normal.       - ADRENALS: Normal.   - KIDNEYS/URETERS: No hydronephrosis or significant mass. - BLADDER: Normal.   - REPRODUCTIVE ORGANS: No pelvic masses. - BOWEL: Normal caliber. No inflammatory changes. The appendix is normal in   size.   - LYMPH NODES: No significant retroperitoneal, mesenteric, or pelvic adenopathy.   - BONES: No fracture or significant bone lesion.   - VASCULATURE: Normal   - OTHER: No ascites. Impression   No acute findings in abdomen or pelvis           I reviewed recent labs and recent radiologic studies. ASSESSMENT/PLAN:    Principal Problem:    UTI (urinary tract infection) with pyuria  Active Problems:    Psoriasis    Cellulitis  Resolved Problems:    * No resolved hospital problems. 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   No indication for surgical incision and debridement for complicated cellulitis 04/88/80  Further input per attending surgeon    Signed:  MARITZA Colunga - NP

## 2022-12-12 NOTE — PROGRESS NOTES
Progress Note    Patient: Veronica Pringle MRN: [id number]  SSN: [id number]    YOB: 1980  Age: 38 y.o. Sex: female      Admit Date: 12/11/2022    LOS: 1 day     Assessment and Plan:   38 y.o. female with medical history of psoriasis who presented to emergency room with urinary frequency and dysuria    1. Concerns of urinary tract infection  -Continue linezolid and ceftriaxone  -Follow cultures  -Symptomatic management    2. Inguinal cellulitis and concerns of abscess  -Continue ceftriaxone  -Follow cultures  -General surgery consultation for possible drainage    3. Psoriasis  -Outpatient follow-up    DVT prophylaxis with Lovenox    Subjective:   38 y.o. female with medical history of psoriasis who presented to emergency room with urinary frequency and dysuria. Patient seen and examined at bedside. This morning still presenting with some pain at the left inguinal area with some drainage. Denies any chest pain, abdominal pain, no nausea or vomiting. Objective:     Vitals:    12/12/22 0255 12/12/22 0744 12/12/22 0746 12/12/22 1147   BP: 102/72 115/70 115/70 (!) 124/91   Pulse: 68 65 61 65   Resp: 18 18  18   Temp: 98.2 °F (36.8 °C) 99.7 °F (37.6 °C)  98.4 °F (36.9 °C)   TempSrc: Oral Oral  Oral   SpO2: 98% 96% 97% 98%   Weight:       Height:            Intake and Output:  Current Shift: 12/12 0701 - 12/12 1900  In: 360 [P.O.:360]  Out: -   Last three shifts: No intake/output data recorded.     ROS  10 ROS negative except from stated on subjective    Physical Exam:   General: Alert, oriented, NAD  HEENT: NC/AT, EOM are intact  Neck: supple, no JVD  Cardiovascular: RRR, S1, S2, no murmurs  Respiratory: Lungs are clear, no wheezes or rales  Abdomen: Soft, NT, ND  Back: No CVA tenderness, no paraspinal tenderness  Extremities: LE without pedal edema, no erythema  Neuro: A&O, CN are intact, no focal deficits  Skin: Erythematous rash in the pelvic area  Psych: good mood and affect    Lab/Data Review:  I have personally reviewed patients laboratory data showing  Recent Results (from the past 24 hour(s))   Basic Metabolic Panel w/ Reflex to MG    Collection Time: 12/12/22  6:02 AM   Result Value Ref Range    Sodium 141 133 - 143 mmol/L    Potassium 4.3 3.5 - 5.1 mmol/L    Chloride 111 (H) 101 - 110 mmol/L    CO2 25 21 - 32 mmol/L    Anion Gap 5 2 - 11 mmol/L    Glucose 89 65 - 100 mg/dL    BUN 10 6 - 23 MG/DL    Creatinine 0.70 0.6 - 1.0 MG/DL    Est, Glom Filt Rate >60 >60 ml/min/1.73m2    Calcium 8.3 8.3 - 10.4 MG/DL   CBC with Auto Differential    Collection Time: 12/12/22  6:02 AM   Result Value Ref Range    WBC 10.1 4.3 - 11.1 K/uL    RBC 4.53 4.05 - 5.2 M/uL    Hemoglobin 13.1 11.7 - 15.4 g/dL    Hematocrit 42.2 35.8 - 46.3 %    MCV 93.2 82 - 102 FL    MCH 28.9 26.1 - 32.9 PG    MCHC 31.0 (L) 31.4 - 35.0 g/dL    RDW 14.1 11.9 - 14.6 %    Platelets 064 865 - 890 K/uL    MPV 10.2 9.4 - 12.3 FL    nRBC 0.00 0.0 - 0.2 K/uL    Differential Type AUTOMATED      Seg Neutrophils 68 43 - 78 %    Lymphocytes 16 13 - 44 %    Monocytes 10 4.0 - 12.0 %    Eosinophils % 4 0.5 - 7.8 %    Basophils 1 0.0 - 2.0 %    Immature Granulocytes 1 0.0 - 5.0 %    Segs Absolute 7.0 1.7 - 8.2 K/UL    Absolute Lymph # 1.6 0.5 - 4.6 K/UL    Absolute Mono # 1.1 0.1 - 1.3 K/UL    Absolute Eos # 0.4 0.0 - 0.8 K/UL    Basophils Absolute 0.1 0.0 - 0.2 K/UL    Absolute Immature Granulocyte 0.1 0.0 - 0.5 K/UL        Image:  I have personally reviewed patients imaging showing  CT ABDOMEN PELVIS W IV CONTRAST Additional Contrast? None   Final Result   No acute findings in abdomen or pelvis         If there are any questions about this report, I can be reached on [employer]   or at [phone]         XR CHEST PORTABLE   Final Result   No acute findings in the chest              Current Facility-Administered Medications   Medication Dose Route Frequency Provider Last Rate Last Admin    linezolid (ZYVOX) tablet 600 mg  600 mg Oral 2 times per day Con Ramirez MD   600 mg at 12/12/22 0935    diphenhydrAMINE (BENADRYL) capsule 25 mg  25 mg Oral Q6H PRN Epifanio Yuen MD        sodium chloride flush 0.9 % injection 5-40 mL  5-40 mL IntraVENous 2 times per day Epifanio Yuen MD   10 mL at 12/12/22 0936    sodium chloride flush 0.9 % injection 5-40 mL  5-40 mL IntraVENous PRN Epifanio Yuen MD        0.9 % sodium chloride infusion   IntraVENous PRN Epifanio Yuen MD        ondansetron (ZOFRAN-ODT) disintegrating tablet 4 mg  4 mg Oral Q8H PRN Epifanio Yuen MD        Or    ondansetron (ZOFRAN) injection 4 mg  4 mg IntraVENous Q6H PRN Epifanio Yuen MD   4 mg at 12/11/22 2152    polyethylene glycol (GLYCOLAX) packet 17 g  17 g Oral Daily PRN Epifanio Yuen MD        bisacodyl (DULCOLAX) suppository 10 mg  10 mg Rectal Daily PRN Epifanio Yuen MD        famotidine (PEPCID) tablet 10 mg  10 mg Oral Daily PRN Epifanio Yuen MD   10 mg at 12/11/22 2152    aluminum & magnesium hydroxide-simethicone (MAALOX) 200-200-20 MG/5ML suspension 30 mL  30 mL Oral Q6H PRN Epifanio Yuen MD   30 mL at 12/11/22 2030    acetaminophen (TYLENOL) tablet 650 mg  650 mg Oral Q6H PRN Epifanio Yuen MD   650 mg at 12/11/22 2152    Or    acetaminophen (TYLENOL) suppository 650 mg  650 mg Rectal Q6H PRN Epifanio Yuen MD        enoxaparin (LOVENOX) injection 40 mg  40 mg SubCUTAneous Q24H Nicole Kerr MD   40 mg at 12/11/22 1521    cefTRIAXone (ROCEPHIN) 1,000 mg in sodium chloride 0.9 % 50 mL IVPB mini-bag  1,000 mg IntraVENous Daily Epifanio Yuen MD   Stopped at 12/12/22 1031    mupirocin (BACTROBAN) 2 % ointment   Topical BID Epifanio Yuen MD   Given at 12/12/22 1201 [employer] problems     Patient Active Problem List   Diagnosis    UTI (urinary tract infection) with pyuria    Psoriasis    Cellulitis        I have reviewed, updated, and verified this note's content and spent 38 minutes of my 42 minutes visit performing counseling and coordination of care regarding medical management.        Signed By: Manny You MD     December 12, 2022

## 2022-12-12 NOTE — PLAN OF CARE
Problem: Discharge Planning  Goal: Discharge to home or other facility with appropriate resources  12/11/2022 2255 by Felicity Gutierrez RN  Outcome: Progressing  12/11/2022 2253 by Felicity Gutierrez RN  Outcome: Progressing  Flowsheets (Taken 12/11/2022 2015)  Discharge to home or other facility with appropriate resources: Identify barriers to discharge with patient and caregiver  12/11/2022 1820 by Ria Obregon RN  Outcome: Progressing     Problem: Pain  Goal: Verbalizes/displays adequate comfort level or baseline comfort level  12/11/2022 2255 by Felicity Gutierrez RN  Outcome: Progressing  12/11/2022 2253 by Felicity Gutierrez RN  Outcome: Progressing  Flowsheets  Taken 12/11/2022 2152  Verbalizes/displays adequate comfort level or baseline comfort level: Encourage patient to monitor pain and request assistance  Taken 12/11/2022 1905  Verbalizes/displays adequate comfort level or baseline comfort level: Encourage patient to monitor pain and request assistance  12/11/2022 1820 by Ria Obregon RN  Outcome: Progressing

## 2022-12-13 LAB
ANION GAP SERPL CALC-SCNC: 7 MMOL/L (ref 2–11)
BACTERIA SPEC CULT: ABNORMAL
BACTERIA SPEC CULT: ABNORMAL
BASOPHILS # BLD: 0.1 K/UL (ref 0–0.2)
BASOPHILS NFR BLD: 1 % (ref 0–2)
BUN SERPL-MCNC: 6 MG/DL (ref 6–23)
CALCIUM SERPL-MCNC: 8.6 MG/DL (ref 8.3–10.4)
CHLORIDE SERPL-SCNC: 110 MMOL/L (ref 101–110)
CO2 SERPL-SCNC: 22 MMOL/L (ref 21–32)
CREAT SERPL-MCNC: 0.6 MG/DL (ref 0.6–1)
DIFFERENTIAL METHOD BLD: ABNORMAL
EOSINOPHIL # BLD: 0.5 K/UL (ref 0–0.8)
EOSINOPHIL NFR BLD: 4 % (ref 0.5–7.8)
ERYTHROCYTE [DISTWIDTH] IN BLOOD BY AUTOMATED COUNT: 14 % (ref 11.9–14.6)
GLUCOSE SERPL-MCNC: 92 MG/DL (ref 65–100)
HCT VFR BLD AUTO: 41.1 % (ref 35.8–46.3)
HGB BLD-MCNC: 13 G/DL (ref 11.7–15.4)
IMM GRANULOCYTES # BLD AUTO: 0.1 K/UL (ref 0–0.5)
IMM GRANULOCYTES NFR BLD AUTO: 1 % (ref 0–5)
LYMPHOCYTES # BLD: 0.8 K/UL (ref 0.5–4.6)
LYMPHOCYTES NFR BLD: 7 % (ref 13–44)
MCH RBC QN AUTO: 28.6 PG (ref 26.1–32.9)
MCHC RBC AUTO-ENTMCNC: 31.6 G/DL (ref 31.4–35)
MCV RBC AUTO: 90.3 FL (ref 82–102)
MONOCYTES # BLD: 0.9 K/UL (ref 0.1–1.3)
MONOCYTES NFR BLD: 8 % (ref 4–12)
NEUTS SEG # BLD: 10.1 K/UL (ref 1.7–8.2)
NEUTS SEG NFR BLD: 79 % (ref 43–78)
NRBC # BLD: 0 K/UL (ref 0–0.2)
PLATELET # BLD AUTO: 316 K/UL (ref 150–450)
PMV BLD AUTO: 10.1 FL (ref 9.4–12.3)
POTASSIUM SERPL-SCNC: 3.8 MMOL/L (ref 3.5–5.1)
RBC # BLD AUTO: 4.55 M/UL (ref 4.05–5.2)
SERVICE CMNT-IMP: ABNORMAL
SODIUM SERPL-SCNC: 139 MMOL/L (ref 133–143)
WBC # BLD AUTO: 12.5 K/UL (ref 4.3–11.1)

## 2022-12-13 PROCEDURE — 6370000000 HC RX 637 (ALT 250 FOR IP): Performed by: INTERNAL MEDICINE

## 2022-12-13 PROCEDURE — 6360000002 HC RX W HCPCS: Performed by: INTERNAL MEDICINE

## 2022-12-13 PROCEDURE — 36415 COLL VENOUS BLD VENIPUNCTURE: CPT

## 2022-12-13 PROCEDURE — 85025 COMPLETE CBC W/AUTO DIFF WBC: CPT

## 2022-12-13 PROCEDURE — 2580000003 HC RX 258: Performed by: INTERNAL MEDICINE

## 2022-12-13 PROCEDURE — 80048 BASIC METABOLIC PNL TOTAL CA: CPT

## 2022-12-13 PROCEDURE — 1100000000 HC RM PRIVATE

## 2022-12-13 PROCEDURE — 99252 IP/OBS CONSLTJ NEW/EST SF 35: CPT | Performed by: OBSTETRICS & GYNECOLOGY

## 2022-12-13 RX ORDER — LORAZEPAM 2 MG/ML
1 INJECTION INTRAMUSCULAR EVERY 6 HOURS PRN
Status: DISCONTINUED | OUTPATIENT
Start: 2022-12-13 | End: 2022-12-14 | Stop reason: HOSPADM

## 2022-12-13 RX ORDER — SODIUM CHLORIDE, SODIUM LACTATE, POTASSIUM CHLORIDE, CALCIUM CHLORIDE 600; 310; 30; 20 MG/100ML; MG/100ML; MG/100ML; MG/100ML
INJECTION, SOLUTION INTRAVENOUS CONTINUOUS
Status: DISCONTINUED | OUTPATIENT
Start: 2022-12-14 | End: 2022-12-14 | Stop reason: HOSPADM

## 2022-12-13 RX ADMIN — LORAZEPAM 1 MG: 2 INJECTION INTRAMUSCULAR; INTRAVENOUS at 18:53

## 2022-12-13 RX ADMIN — SODIUM CHLORIDE, PRESERVATIVE FREE 10 ML: 5 INJECTION INTRAVENOUS at 20:15

## 2022-12-13 RX ADMIN — LINEZOLID 600 MG: 600 TABLET, FILM COATED ORAL at 20:14

## 2022-12-13 RX ADMIN — FAMOTIDINE 10 MG: 20 TABLET, FILM COATED ORAL at 20:15

## 2022-12-13 RX ADMIN — LINEZOLID 600 MG: 600 TABLET, FILM COATED ORAL at 09:26

## 2022-12-13 RX ADMIN — MUPIROCIN: 20 OINTMENT TOPICAL at 20:15

## 2022-12-13 RX ADMIN — ENOXAPARIN SODIUM 40 MG: 100 INJECTION SUBCUTANEOUS at 14:33

## 2022-12-13 RX ADMIN — CEFTRIAXONE 1000 MG: 1 INJECTION, POWDER, FOR SOLUTION INTRAMUSCULAR; INTRAVENOUS at 09:26

## 2022-12-13 RX ADMIN — SODIUM CHLORIDE, PRESERVATIVE FREE 10 ML: 5 INJECTION INTRAVENOUS at 09:27

## 2022-12-13 RX ADMIN — DIPHENHYDRAMINE HYDROCHLORIDE 25 MG: 25 CAPSULE ORAL at 20:14

## 2022-12-13 RX ADMIN — MUPIROCIN: 20 OINTMENT TOPICAL at 09:27

## 2022-12-13 RX ADMIN — ACETAMINOPHEN 650 MG: 325 TABLET, FILM COATED ORAL at 20:14

## 2022-12-13 ASSESSMENT — PAIN DESCRIPTION - LOCATION: LOCATION: BACK

## 2022-12-13 ASSESSMENT — PAIN SCALES - GENERAL: PAINLEVEL_OUTOF10: 3

## 2022-12-13 ASSESSMENT — PAIN DESCRIPTION - ORIENTATION: ORIENTATION: LOWER

## 2022-12-13 ASSESSMENT — PAIN DESCRIPTION - PAIN TYPE: TYPE: ACUTE PAIN

## 2022-12-13 ASSESSMENT — PAIN DESCRIPTION - FREQUENCY: FREQUENCY: CONTINUOUS

## 2022-12-13 ASSESSMENT — PAIN DESCRIPTION - DESCRIPTORS: DESCRIPTORS: ACHING

## 2022-12-13 ASSESSMENT — PAIN DESCRIPTION - ONSET: ONSET: ON-GOING

## 2022-12-13 NOTE — PROGRESS NOTES
Progress Note    Patient: Gabrielle Espinoza MRN: [id number]  SSN: [id number]    YOB: 1980  Age: 38 y.o. Sex: female      Admit Date: 12/11/2022    LOS: 2 days     Assessment and Plan:   38 y.o. female with medical history of psoriasis who presented to emergency room with urinary frequency and dysuria    1. Concerns of urinary tract infection  -Continue linezolid and ceftriaxone  -Follow cultures  -Symptomatic management    2. Inguinal cellulitis and concerns of abscess  -Continue ceftriaxone  -Follow cultures  -General surgery recommendations appreciated, no surgical intervention  -OB/GYN consulted    3. Psoriasis  -Outpatient follow-up    DVT prophylaxis with Lovenox    Dispo: To be determined pending OB/GYN evaluation    Subjective:   38 y.o. female with medical history of psoriasis who presented to emergency room with urinary frequency and dysuria. Patient seen and examined at bedside. This morning still presenting with some pain at the left inguinal area with some drainage. Denies any chest pain, abdominal pain, no nausea or vomiting. Objective:     Vitals:    12/12/22 2323 12/13/22 0359 12/13/22 0844 12/13/22 1203   BP: 119/80 106/66 118/75 111/81   Pulse: 83 74 66 69   Resp: 18 16 17 18   Temp: 99.1 °F (37.3 °C) 98.8 °F (37.1 °C) 97.3 °F (36.3 °C) 97.3 °F (36.3 °C)   TempSrc: Oral Oral Oral Oral   SpO2: 96% 96% 96% 99%   Weight:       Height:            Intake and Output:  Current Shift: No intake/output data recorded.   Last three shifts: 12/11 1901 - 12/13 0700  In: 840 [P.O.:840]  Out: -     ROS  10 ROS negative except from stated on subjective    Physical Exam:   General: Alert, oriented, NAD  HEENT: NC/AT, EOM are intact  Neck: supple, no JVD  Cardiovascular: RRR, S1, S2, no murmurs  Respiratory: Lungs are clear, no wheezes or rales  Abdomen: Soft, NT, ND  Back: No CVA tenderness, no paraspinal tenderness  Extremities: LE without pedal edema, no erythema  Neuro: A&O, CN are intact, no focal deficits  Skin: Erythematous rash in the pelvic area  Psych: good mood and affect    Lab/Data Review:  I have personally reviewed patients laboratory data showing  Recent Results (from the past 24 hour(s))   Basic Metabolic Panel w/ Reflex to MG    Collection Time: 12/13/22  5:45 AM   Result Value Ref Range    Sodium 139 133 - 143 mmol/L    Potassium 3.8 3.5 - 5.1 mmol/L    Chloride 110 101 - 110 mmol/L    CO2 22 21 - 32 mmol/L    Anion Gap 7 2 - 11 mmol/L    Glucose 92 65 - 100 mg/dL    BUN 6 6 - 23 MG/DL    Creatinine 0.60 0.6 - 1.0 MG/DL    Est, Glom Filt Rate >60 >60 ml/min/1.73m2    Calcium 8.6 8.3 - 10.4 MG/DL   CBC with Auto Differential    Collection Time: 12/13/22  5:45 AM   Result Value Ref Range    WBC 12.5 (H) 4.3 - 11.1 K/uL    RBC 4.55 4.05 - 5.2 M/uL    Hemoglobin 13.0 11.7 - 15.4 g/dL    Hematocrit 41.1 35.8 - 46.3 %    MCV 90.3 82 - 102 FL    MCH 28.6 26.1 - 32.9 PG    MCHC 31.6 31.4 - 35.0 g/dL    RDW 14.0 11.9 - 14.6 %    Platelets 068 381 - 142 K/uL    MPV 10.1 9.4 - 12.3 FL    nRBC 0.00 0.0 - 0.2 K/uL    Differential Type AUTOMATED      Seg Neutrophils 79 (H) 43 - 78 %    Lymphocytes 7 (L) 13 - 44 %    Monocytes 8 4.0 - 12.0 %    Eosinophils % 4 0.5 - 7.8 %    Basophils 1 0.0 - 2.0 %    Immature Granulocytes 1 0.0 - 5.0 %    Segs Absolute 10.1 (H) 1.7 - 8.2 K/UL    Absolute Lymph # 0.8 0.5 - 4.6 K/UL    Absolute Mono # 0.9 0.1 - 1.3 K/UL    Absolute Eos # 0.5 0.0 - 0.8 K/UL    Basophils Absolute 0.1 0.0 - 0.2 K/UL    Absolute Immature Granulocyte 0.1 0.0 - 0.5 K/UL        Image:  I have personally reviewed patients imaging showing  CT ABDOMEN PELVIS W IV CONTRAST Additional Contrast? None   Final Result   No acute findings in abdomen or pelvis         If there are any questions about this report, I can be reached on [employer]   or at [phone]         XR CHEST PORTABLE   Final Result   No acute findings in the chest              Current Facility-Administered Medications Medication Dose Route Frequency Provider Last Rate Last Admin    linezolid (ZYVOX) tablet 600 mg  600 mg Oral 2 times per day Manny You MD   600 mg at 12/13/22 7501    diphenhydrAMINE (BENADRYL) capsule 25 mg  25 mg Oral Q6H PRN Joseph Mccann MD   25 mg at 12/12/22 2057    sodium chloride flush 0.9 % injection 5-40 mL  5-40 mL IntraVENous 2 times per day Joseph Mccann MD   10 mL at 12/13/22 0927    sodium chloride flush 0.9 % injection 5-40 mL  5-40 mL IntraVENous PRN Joseph Mccann MD        0.9 % sodium chloride infusion   IntraVENous PRN Joseph Mccann MD        ondansetron (ZOFRAN-ODT) disintegrating tablet 4 mg  4 mg Oral Q8H PRN Joseph Mccann MD        Or    ondansetron (ZOFRAN) injection 4 mg  4 mg IntraVENous Q6H PRN Joseph Mccann MD   4 mg at 12/11/22 2152    polyethylene glycol (GLYCOLAX) packet 17 g  17 g Oral Daily PRN Joseph Mccann MD        bisacodyl (DULCOLAX) suppository 10 mg  10 mg Rectal Daily PRN Joseph Mccann MD        famotidine (PEPCID) tablet 10 mg  10 mg Oral Daily PRN Joseph Mccann MD   10 mg at 12/12/22 2058    aluminum & magnesium hydroxide-simethicone (MAALOX) 200-200-20 MG/5ML suspension 30 mL  30 mL Oral Q6H PRN Joseph Mccann MD   30 mL at 12/11/22 2030    acetaminophen (TYLENOL) tablet 650 mg  650 mg Oral Q6H PRN Joseph Mccann MD   650 mg at 12/12/22 2057    Or    acetaminophen (TYLENOL) suppository 650 mg  650 mg Rectal Q6H PRN Joseph Mccann MD        enoxaparin (LOVENOX) injection 40 mg  40 mg SubCUTAneous Q24H Nicole Kerr MD   40 mg at 12/12/22 1612    cefTRIAXone (ROCEPHIN) 1,000 mg in sodium chloride 0.9 % 50 mL IVPB mini-bag  1,000 mg IntraVENous Daily Joseph Mccann MD   Stopped at 12/13/22 1012    mupirocin (BACTROBAN) 2 % ointment   Topical BID Joseph Mccann MD   Given at 12/13/22 [address] problems     Patient Active Problem List   Diagnosis    UTI (urinary tract infection) with pyuria    Psoriasis    Cellulitis        I have reviewed, updated, and verified this note's content and spent 36 minutes of my 40 minutes visit performing counseling and coordination of care regarding medical management.        Signed By: Mya Quintero MD     December 13, 2022

## 2022-12-13 NOTE — PLAN OF CARE
Problem: Discharge Planning  Goal: Discharge to home or other facility with appropriate resources  12/12/2022 1951 by Dasha Cooper RN  Outcome: Progressing  Flowsheets (Taken 12/12/2022 1930)  Discharge to home or other facility with appropriate resources: Identify barriers to discharge with patient and caregiver  12/12/2022 1736 by Flor Louis RN  Outcome: Progressing  [address] (Taken 12/12/2022 5252)  Discharge to home or other facility with appropriate resources: Identify barriers to discharge with patient and caregiver     Problem: Pain  Goal: Verbalizes/displays adequate comfort level or baseline comfort level  12/12/2022 1951 by Dasha Cooper RN  Outcome: Progressing  12/12/2022 1736 by Flor Louis RN  Outcome: Progressing

## 2022-12-13 NOTE — PROGRESS NOTES
Consult Note  Franco Dsouza  MRN: [id number]  :1980  Age:42 y.o.    HPI: Franco Dsouza is a 38 y.o. female who we are asked in consultation by Dr. Courtney Valentine (hospitalist)  to see for questionable left inguinal abscess. The patient has a PMHx of psoriasis and arthritis. Pt was seen and discharged from the Ed 22 for left groin cellulitis. Pt completed a course of bactrim after 22 ED presentation with some resolution of left groin cellulitis but over the past few days (prior to her ED presentation 22) cellulitis has worsened and she developed dysuria and urinary frequency. Of note, pt had approximately 7 bedside groin I&Ds by her GYN from age [age]; however, over the past 12 years she has not experienced groin cellulitis. In the ED 22, she was tachycardic to 140s  on arrival but otherwise hemodynamically stable. Follow up EKG in ED 22 showed:NSR(HR=98)  Laboratory work-up was notable for lactic of 3.7  07:47 with interval lactic acid 1.4  10:06     Urinalysis with large leukocyte esterase negative nitrite, 4+ bacteria. CT abdomen pelvis without any acute abnormalities. She was admitted on 2022 for UTI and left groin cellulitis with lactic acidosis.        Pt with regular diet at time of consult  Pt not taking anticoagulation    Past Medical History:   Diagnosis Date    Arthritis     Psoriasis      Past Surgical History:   Procedure Laterality Date    GYN          ORTHOPEDIC SURGERY      cyst behind right knee     Current Facility-Administered Medications   Medication Dose Route Frequency    linezolid (ZYVOX) tablet 600 mg  600 mg Oral 2 times per day    diphenhydrAMINE (BENADRYL) capsule 25 mg  25 mg Oral Q6H PRN    sodium chloride flush 0.9 % injection 5-40 mL  5-40 mL IntraVENous 2 times per day    sodium chloride flush 0.9 % injection 5-40 mL  5-40 mL IntraVENous PRN    0.9 % sodium chloride infusion   IntraVENous PRN    ondansetron (ZOFRAN-ODT) disintegrating tablet 4 mg  4 mg Oral Q8H PRN    Or    ondansetron (ZOFRAN) injection 4 mg  4 mg IntraVENous Q6H PRN    polyethylene glycol (GLYCOLAX) packet 17 g  17 g Oral Daily PRN    bisacodyl (DULCOLAX) suppository 10 mg  10 mg Rectal Daily PRN    famotidine (PEPCID) tablet 10 mg  10 mg Oral Daily PRN    aluminum & magnesium hydroxide-simethicone (MAALOX) 200-200-20 MG/5ML suspension 30 mL  30 mL Oral Q6H PRN    acetaminophen (TYLENOL) tablet 650 mg  650 mg Oral Q6H PRN    Or    acetaminophen (TYLENOL) suppository 650 mg  650 mg Rectal Q6H PRN    enoxaparin (LOVENOX) injection 40 mg  40 mg SubCUTAneous Q24H    cefTRIAXone (ROCEPHIN) 1,000 mg in sodium chloride 0.9 % 50 mL IVPB mini-bag  1,000 mg IntraVENous Daily    mupirocin (BACTROBAN) 2 % ointment   Topical BID     Prednisone and Vancomycin  Social History     Socioeconomic History    Marital status: Single     Spouse name: None    Number of children: None    Years of education: None    Highest education level: None   Tobacco Use    Smoking status: Every Day     Packs/day: 1.00     Types: Cigarettes    Smokeless tobacco: Never   Substance and Sexual Activity    Alcohol use: No    Drug use: No     Social History     Tobacco Use   Smoking Status Every Day    Packs/day: 1.00    Types: Cigarettes   Smokeless Tobacco Never     History reviewed. No pertinent family history. ROS: The patient has no difficulty with chest pain or shortness of breath. No fever or chills. Comprehensive review of systems was otherwise unremarkable except as noted above. Physical Exam:   /66   Pulse 74   Temp 98.8 °F (37.1 °C) (Oral)   Resp 16   Ht 5' 5\" (1.651 m)   Wt 165 lb (74.8 kg)   SpO2 96%   BMI 27.46 kg/m²   Constitutional: Alert, oriented, cooperative patient in no acute distress; appears stated age    Eyes:Sclera are clear.  EOMs intact  ENMT: no external lesions gross hearing normal; no obvious neck masses, no ear or lip lesions, nares normal  CV: RRR. Normal perfusion  Resp: No JVD. Breathing is  non-labored; no audible wheezing. GI: soft and non-distended     Musculoskeletal: unremarkable with normal function. No embolic signs or cyanosis. Neuro:  Oriented; moves all 4; no focal deficits  Psychiatric: normal affect and mood, no memory impairment  Skin: noted psoriasis to RUE, RLE, and LUE. Left inguinal crease with cellulitis. Erythema noted directly in the left inguinal crease with approximately 0.5 cm extension of erythema medially and laterally. Noted 2.5 cm x 2.5 cm palpable thickness mid inguinal crease  with no fluctuance. Copious amount of mupirocin ointment noted to left inguinal fold. Picture of left inguinal crease uploaded to pt chart- media. Recent vitals (if inpt):  Patient Vitals for the past 24 hrs:   BP Temp Temp src Pulse Resp SpO2   12/13/22 0359 106/66 98.8 °F (37.1 °C) Oral 74 16 96 %   12/12/22 2323 119/80 99.1 °F (37.3 °C) Oral 83 18 96 %   12/12/22 1954 134/87 99.5 °F (37.5 °C) Oral 88 18 99 %   12/12/22 1510 126/73 98.2 °F (36.8 °C) Oral 58 18 100 %   12/12/22 1147 (!) 124/91 98.4 °F (36.9 °C) Oral 65 18 98 %   12/12/22 0746 115/70 -- -- 61 -- 97 %   12/12/22 0744 115/70 99.7 °F (37.6 °C) Oral 65 18 96 %       Labs:  Recent Labs     12/11/22  0707 12/12/22  0602 12/13/22  0545   WBC 10.6 10.1 12.5*   HGB 14.3 13.1 13.0    339 316    141  --    K 4.0 4.3  --     111*  --    CO2 23 25  --    BUN 8 10  --    ALT 21  --   --        Lab Results   Component Value Date/Time    WBC 12.5 12/13/2022 05:45 AM    HGB 13.0 12/13/2022 05:45 AM     12/13/2022 05:45 AM     12/12/2022 06:02 AM    K 4.3 12/12/2022 06:02 AM     12/12/2022 06:02 AM    CO2 25 12/12/2022 06:02 AM    BUN 10 12/12/2022 06:02 AM    ALT 21 12/11/2022 07:07 AM     CT OF THE ABDOMEN AND PELVIS 12/11/22 09:35       INDICATION: Dysuria       Multiple axial images were obtained through the abdomen and pelvis.   Oral

## 2022-12-14 VITALS
RESPIRATION RATE: 20 BRPM | HEART RATE: 67 BPM | OXYGEN SATURATION: 100 % | SYSTOLIC BLOOD PRESSURE: 135 MMHG | DIASTOLIC BLOOD PRESSURE: 90 MMHG | WEIGHT: 165 LBS | BODY MASS INDEX: 27.49 KG/M2 | TEMPERATURE: 99 F | HEIGHT: 65 IN

## 2022-12-14 LAB
ANION GAP SERPL CALC-SCNC: 4 MMOL/L (ref 2–11)
BASOPHILS # BLD: 0.1 K/UL (ref 0–0.2)
BASOPHILS NFR BLD: 1 % (ref 0–2)
BUN SERPL-MCNC: 5 MG/DL (ref 6–23)
CALCIUM SERPL-MCNC: 8.2 MG/DL (ref 8.3–10.4)
CHLORIDE SERPL-SCNC: 111 MMOL/L (ref 101–110)
CO2 SERPL-SCNC: 24 MMOL/L (ref 21–32)
CREAT SERPL-MCNC: 0.6 MG/DL (ref 0.6–1)
DIFFERENTIAL METHOD BLD: ABNORMAL
EOSINOPHIL # BLD: 0.5 K/UL (ref 0–0.8)
EOSINOPHIL NFR BLD: 6 % (ref 0.5–7.8)
ERYTHROCYTE [DISTWIDTH] IN BLOOD BY AUTOMATED COUNT: 13.8 % (ref 11.9–14.6)
GLUCOSE SERPL-MCNC: 91 MG/DL (ref 65–100)
HCT VFR BLD AUTO: 44.2 % (ref 35.8–46.3)
HGB BLD-MCNC: 13.6 G/DL (ref 11.7–15.4)
IMM GRANULOCYTES # BLD AUTO: 0.1 K/UL (ref 0–0.5)
IMM GRANULOCYTES NFR BLD AUTO: 1 % (ref 0–5)
LYMPHOCYTES # BLD: 1.2 K/UL (ref 0.5–4.6)
LYMPHOCYTES NFR BLD: 13 % (ref 13–44)
MCH RBC QN AUTO: 28.9 PG (ref 26.1–32.9)
MCHC RBC AUTO-ENTMCNC: 30.8 G/DL (ref 31.4–35)
MCV RBC AUTO: 93.8 FL (ref 82–102)
MONOCYTES # BLD: 0.8 K/UL (ref 0.1–1.3)
MONOCYTES NFR BLD: 10 % (ref 4–12)
NEUTS SEG # BLD: 6 K/UL (ref 1.7–8.2)
NEUTS SEG NFR BLD: 69 % (ref 43–78)
NRBC # BLD: 0 K/UL (ref 0–0.2)
PLATELET # BLD AUTO: 288 K/UL (ref 150–450)
PMV BLD AUTO: 10.1 FL (ref 9.4–12.3)
POTASSIUM SERPL-SCNC: 4.4 MMOL/L (ref 3.5–5.1)
RBC # BLD AUTO: 4.71 M/UL (ref 4.05–5.2)
SODIUM SERPL-SCNC: 139 MMOL/L (ref 133–143)
WBC # BLD AUTO: 8.6 K/UL (ref 4.3–11.1)

## 2022-12-14 PROCEDURE — 36415 COLL VENOUS BLD VENIPUNCTURE: CPT

## 2022-12-14 PROCEDURE — 6360000002 HC RX W HCPCS: Performed by: INTERNAL MEDICINE

## 2022-12-14 PROCEDURE — 2580000003 HC RX 258: Performed by: NURSE PRACTITIONER

## 2022-12-14 PROCEDURE — 80048 BASIC METABOLIC PNL TOTAL CA: CPT

## 2022-12-14 PROCEDURE — 6370000000 HC RX 637 (ALT 250 FOR IP): Performed by: INTERNAL MEDICINE

## 2022-12-14 PROCEDURE — 85025 COMPLETE CBC W/AUTO DIFF WBC: CPT

## 2022-12-14 PROCEDURE — 2580000003 HC RX 258: Performed by: INTERNAL MEDICINE

## 2022-12-14 RX ORDER — AMOXICILLIN AND CLAVULANATE POTASSIUM 875; 125 MG/1; MG/1
1 TABLET, FILM COATED ORAL 2 TIMES DAILY
Qty: 14 TABLET | Refills: 0 | Status: SHIPPED | OUTPATIENT
Start: 2022-12-14 | End: 2022-12-21

## 2022-12-14 RX ORDER — DOXYCYCLINE HYCLATE 100 MG
100 TABLET ORAL 2 TIMES DAILY
Qty: 14 TABLET | Refills: 0 | Status: SHIPPED | OUTPATIENT
Start: 2022-12-14 | End: 2022-12-21

## 2022-12-14 RX ADMIN — LINEZOLID 600 MG: 600 TABLET, FILM COATED ORAL at 13:31

## 2022-12-14 RX ADMIN — SODIUM CHLORIDE, POTASSIUM CHLORIDE, SODIUM LACTATE AND CALCIUM CHLORIDE: 600; 310; 30; 20 INJECTION, SOLUTION INTRAVENOUS at 06:51

## 2022-12-14 RX ADMIN — CEFTRIAXONE 1000 MG: 1 INJECTION, POWDER, FOR SOLUTION INTRAMUSCULAR; INTRAVENOUS at 10:07

## 2022-12-14 RX ADMIN — SODIUM CHLORIDE, PRESERVATIVE FREE 10 ML: 5 INJECTION INTRAVENOUS at 10:08

## 2022-12-14 RX ADMIN — MUPIROCIN: 20 OINTMENT TOPICAL at 10:07

## 2022-12-14 ASSESSMENT — PAIN SCALES - GENERAL: PAINLEVEL_OUTOF10: 0

## 2022-12-14 NOTE — PROGRESS NOTES
Consult Note  Pastor Easton  MRN: [id number]  :1980  Age:42 y.o.    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 Page Pettit (hospitalist)  to see for questionable left inguinal abscess. The patient has a PMHx of psoriasis and arthritis. Pt was seen and discharged from the Ed 22 for left groin cellulitis. Pt completed a course of bactrim after 22 ED presentation with some resolution of left groin cellulitis but over the past few days (prior to her ED presentation 22) cellulitis has worsened and she developed dysuria and urinary frequency. Of note, pt had approximately 7 bedside groin I&Ds by her GYN from age [age]; however, over the past 12 years she has not experienced groin cellulitis. In the ED 22, she was tachycardic to 140s  on arrival but otherwise hemodynamically stable. Follow up EKG in ED 22 showed:NSR(HR=98)  Laboratory work-up was notable for lactic of 3.7  07:47 with interval lactic acid 1.4  10:06     Urinalysis with large leukocyte esterase negative nitrite, 4+ bacteria. CT abdomen pelvis without any acute abnormalities. She was admitted on 2022 for UTI and left groin cellulitis with lactic acidosis. Pt with regular diet at time of consult  Pt not taking anticoagulation    22: Alert, awake. No new concerns. WBC down to 8.6. Staff report anxiety overnight. Patient concerned about her work and wanting DC today. OB/GYN saw pt last night.  AF/VSS    Past Medical History:   Diagnosis Date    Arthritis     Psoriasis      Past Surgical History:   Procedure Laterality Date    GYN          ORTHOPEDIC SURGERY      cyst behind right knee     Current Facility-Administered Medications   Medication Dose Route Frequency    lactated ringers infusion   IntraVENous Continuous    LORazepam (ATIVAN) injection 1 mg  1 mg IntraVENous Q6H PRN    linezolid (ZYVOX) tablet 600 mg  600 mg Oral 2 times per day    diphenhydrAMINE (BENADRYL) capsule 25 mg  25 mg Oral Q6H PRN    sodium chloride flush 0.9 % injection 5-40 mL  5-40 mL IntraVENous 2 times per day    sodium chloride flush 0.9 % injection 5-40 mL  5-40 mL IntraVENous PRN    0.9 % sodium chloride infusion   IntraVENous PRN    ondansetron (ZOFRAN-ODT) disintegrating tablet 4 mg  4 mg Oral Q8H PRN    Or    ondansetron (ZOFRAN) injection 4 mg  4 mg IntraVENous Q6H PRN    polyethylene glycol (GLYCOLAX) packet 17 g  17 g Oral Daily PRN    bisacodyl (DULCOLAX) suppository 10 mg  10 mg Rectal Daily PRN    famotidine (PEPCID) tablet 10 mg  10 mg Oral Daily PRN    aluminum & magnesium hydroxide-simethicone (MAALOX) 200-200-20 MG/5ML suspension 30 mL  30 mL Oral Q6H PRN    acetaminophen (TYLENOL) tablet 650 mg  650 mg Oral Q6H PRN    Or    acetaminophen (TYLENOL) suppository 650 mg  650 mg Rectal Q6H PRN    enoxaparin (LOVENOX) injection 40 mg  40 mg SubCUTAneous Q24H    cefTRIAXone (ROCEPHIN) 1,000 mg in sodium chloride 0.9 % 50 mL IVPB mini-bag  1,000 mg IntraVENous Daily    mupirocin (BACTROBAN) 2 % ointment   Topical BID     Prednisone and Vancomycin  Social History     Socioeconomic History    Marital status: Single     Spouse name: None    Number of children: None    Years of education: None    Highest education level: None   Tobacco Use    Smoking status: Every Day     Packs/day: 1.00     Types: Cigarettes    Smokeless tobacco: Never   Substance and Sexual Activity    Alcohol use: No    Drug use: No     Social History     Tobacco Use   Smoking Status Every Day    Packs/day: 1.00    Types: Cigarettes   Smokeless Tobacco Never     History reviewed. No pertinent family history. ROS: The patient has no difficulty with chest pain or shortness of breath. No fever or chills. Comprehensive review of systems was otherwise unremarkable except as noted above.     Physical Exam:   /75   Pulse 76   Temp 98.2 °F (36.8 °C) (Oral)   Resp 18   Ht 5' 5\" (1.651 m) Wt 165 lb (74.8 kg)   SpO2 96%   BMI 27.46 kg/m²   Constitutional: Alert, oriented, cooperative patient in no acute distress; appears stated age    Eyes:Sclera are clear. EOMs intact  ENMT: no external lesions gross hearing normal; no obvious neck masses, no ear or lip lesions, nares normal  CV: RRR. Normal perfusion  Resp: No JVD. Breathing is  non-labored; no audible wheezing. GI: soft and non-distended     Musculoskeletal: unremarkable with normal function. No embolic signs or cyanosis. Neuro:  Oriented; moves all 4; no focal deficits  Psychiatric: normal affect and mood, no memory impairment  Skin: noted psoriasis to RUE, RLE, and LUE. Left inguinal crease with cellulitis. Erythema noted directly in the R and L inguinal creases extending to labia without focal thickening or apparent abscess. Psoriasis involving entire mons pubis dry. Recent vitals (if inpt):  Patient Vitals for the past 24 hrs:   BP Temp Temp src Pulse Resp SpO2   12/14/22 0801 121/75 98.2 °F (36.8 °C) Oral 76 18 96 %   12/14/22 0407 106/71 97.2 °F (36.2 °C) Oral 61 17 97 %   12/14/22 0007 114/72 98.1 °F (36.7 °C) Oral 61 18 98 %   12/13/22 2013 114/77 98.6 °F (37 °C) Oral 73 18 99 %   12/13/22 1513 112/68 97.7 °F (36.5 °C) Oral 64 18 98 %   12/13/22 1203 111/81 97.3 °F (36.3 °C) Oral 69 18 99 %       Labs:  Recent Labs     12/14/22  0553   WBC 8.6   HGB 13.6         K 4.4   *   CO2 24   BUN 5*       Lab Results   Component Value Date/Time    WBC 8.6 12/14/2022 05:53 AM    HGB 13.6 12/14/2022 05:53 AM     12/14/2022 05:53 AM     12/14/2022 05:53 AM    K 4.4 12/14/2022 05:53 AM     12/14/2022 05:53 AM    CO2 24 12/14/2022 05:53 AM    BUN 5 12/14/2022 05:53 AM    ALT 21 12/11/2022 07:07 AM     CT OF THE ABDOMEN AND PELVIS 12/11/22 09:35       INDICATION: Dysuria       Multiple axial images were obtained through the abdomen and pelvis. Oral   contrast was used for bowel opacification.   100mL of Isovue 370 intravenous   contrast was used for better evaluation of solid organs and vascular structures. Radiation dose reduction techniques were used for this study. All CT scans   performed at this facility use one or all of the following: Automated exposure   control, adjustment of the mA and/or kVp according to patient's size, iterative   reconstruction. COMPARISON: 07/12/2017       FINDINGS:   - LUNG BASES: No infiltrates or masses. - LIVER: Normal in size and appearance. - GALLBLADDER/BILE DUCTS: No gallstones or bile duct dilatation.   - PANCREAS: Normal.   - SPLEEN: Normal.       - ADRENALS: Normal.   - KIDNEYS/URETERS: No hydronephrosis or significant mass. - BLADDER: Normal.   - REPRODUCTIVE ORGANS: No pelvic masses. - BOWEL: Normal caliber. No inflammatory changes. The appendix is normal in   size.   - LYMPH NODES: No significant retroperitoneal, mesenteric, or pelvic adenopathy.   - BONES: No fracture or significant bone lesion.   - VASCULATURE: Normal   - OTHER: No ascites. Impression   No acute findings in abdomen or pelvis           I reviewed recent labs and recent radiologic studies. ASSESSMENT/PLAN:    Principal Problem:    UTI (urinary tract infection) with pyuria  Active Problems:    Psoriasis    Cellulitis  Resolved Problems:    * No resolved hospital problems. 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 Rheumatology consulted and declined consult as tx not for joint/psoriatic arthritis>>they recommended DERM consult. Apparently patient had been referred to Rheu earlier this year and DNS for appt. Patient voiced concerns regarding systemic tx for her Psoriasis- she apparently does not like certain published side effects of systemic treatment and has visited these options in the past.  Likely will need DERM consult as OP.    No indication for surgical incision and debridement for complicated cellulitis 18/01/10- Appreciate Gyn for their recommendations as noted 12/13.      Signing off  Signed:  MARITZA Craft - CNP

## 2022-12-14 NOTE — DISCHARGE SUMMARY
Hospitalist Discharge Summary   Admit Date:  2022  7:12 AM   DC Note date: 2022  Name:  Adelso Beckham   Age:  38 y.o. Sex:  female  :  1980   MRN:  [id number]   Room:  [facility]  PCP:  Md Tavares Figueroa (Inactive)    Presenting Complaint: Urinary Frequency     Initial Admission Diagnosis: UTI (urinary tract infection) with pyuria [N39.0]  Cellulitis, unspecified cellulitis site [L03.90]     Problem List for this Hospitalization (present on admission):    Principal Problem:    UTI (urinary tract infection) with pyuria  Active Problems:    Psoriasis    Cellulitis  Resolved Problems:    * No resolved hospital problems. *  H&P  Adelso Beckham is a 38 y.o. female with medical history of psoriasis who presented to emergency room with urinary frequency and dysuria. Was seen in the emergency room  symptoms and was given Bactrim without improvement in her symptoms. Also reports that her rash in her left inguinal area has also worsened. Reports having a cyst in the past that was removed by gyn. Denies fever, chills, chest pains, sob. In the ED, she was tachycardic to 140s with EKG showing NSR on arrival but otherwise hemodynamically stable. Laboratory work-up was notable for lactic of 3.7. Urinalysis with large leukocyte esterase negative nitrite, 4+ bacteria. CT abdomen pelvis without any acute abnormalities. Hospital Course:  Admitted for UTI,Inguinal region cellulitis. Pt was started on rocephin and linozolid. Urine culture- strep. Surgery and Gynecology was consulted for Inguinal/pubic region- no abscess, no intervention required. Pt was advised to follow up with Dermatology for her Psoriasis. Pt was discharged on doxycylin and augmentin. Clinically stable for discharge. Disposition: home  Diet: ADULT DIET; Regular  Code Status: Full Code    Follow Ups:   Follow-up Information     PCP Follow up in 1 week(s). DERMATOLOGY. Call in 2 week(s). Time spent in patient discharge and coordination 31 minutes. Plan was discussed with patient. All questions answered. Patient was stable at time of discharge. Instructions given to call a physician or return if any concerns. Current Discharge Medication List        START taking these medications    Details   amoxicillin-clavulanate (AUGMENTIN) 875-125 MG per tablet Take 1 tablet by mouth 2 times daily for 7 days  Qty: 14 tablet, Refills: 0      doxycycline hyclate (VIBRA-TABS) 100 MG tablet Take 1 tablet by mouth 2 times daily for 7 days  Qty: 14 tablet, Refills: 0           STOP taking these medications       miconazole (MICOTIN) 2 % cream Comments:   Reason for Stopping:         indomethacin (INDOCIN) 50 mg capsule Comments:   Reason for Stopping:         fluocinolone (SYNALAR) 0.025 % ointment Comments:   Reason for Stopping:         phenazopyridine (PYRIDIUM) 95 MG tablet Comments:   Reason for Stopping:               Procedures done this admission:  * No surgery found *    Consults this admission:  Manisha  CONSULT TO OB GYN  IP CONSULT TO RHEUMATOLOGY  IP CONSULT TO DERMATOLOGY    Echocardiogram results:  No results found for this or any previous visit.       Diagnostic Imaging/Tests:   CT ABDOMEN PELVIS W IV CONTRAST Additional Contrast? None    Result Date: 12/11/2022  No acute findings in abdomen or pelvis If there are any questions about this report, I can be reached on [facility]YMION or at [phone]     XR CHEST PORTABLE    Result Date: 12/11/2022  No acute findings in the chest        Labs: Results:       BMP, Mg, Phos Recent Labs     12/12/22 0602 12/13/22  0545 12/14/22  0553    139 139   K 4.3 3.8 4.4   * 110 111*   CO2 25 22 24   ANIONGAP 5 7 4   BUN 10 6 5*   CREATININE 0.70 0.60 0.60   LABGLOM >60 >60 >60   CALCIUM 8.3 8.6 8.2*   GLUCOSE 89 92 91      CBC Recent Labs     12/12/22  0602 12/13/22  0545 12/14/22  0553   WBC 10.1 12.5* 8.6   RBC 4. 53 4.55 4.71   HGB 13.1 13.0 13.6   HCT 42.2 41.1 44.2   MCV 93.2 90.3 93.8   MCH 28.9 28.6 28.9   MCHC 31.0* 31.6 30.8*   RDW 14.1 14.0 13.8    316 288   MPV 10.2 10.1 10.1   NRBC 0.00 0.00 0.00   SEGS 68 79* 69   LYMPHOPCT 16 7* 13   EOSRELPCT 4 4 6   MONOPCT 10 8 10   BASOPCT 1 1 1   IMMGRAN 1 1 1   SEGSABS 7.0 10.1* 6.0   LYMPHSABS 1.6 0.8 1.2   EOSABS 0.4 0.5 0.5   MONOSABS 1.1 0.9 0.8   BASOSABS 0.1 0.1 0.1   ABSIMMGRAN 0.1 0.1 0.1      LFT No results for input(s): BILITOT, BILIDIR, ALKPHOS, AST, ALT, PROT, LABALBU, GLOB in the last 72 hours.    Cardiac  Lab Results   Component Value Date/Time    TROPHS 4.3 08/11/2022 10:46 PM    TROPHS 4.1 08/11/2022 08:00 PM    TROPHS 6.4 01/15/2022 10:10 PM      Coags No results found for: PROTIME, INR, APTT   A1c No results found for: LABA1C, EAG   Lipids No results found for: CHOL, LDLCALC, LABVLDL, HDL, CHOLHDLRATIO, TRIG   Thyroid  No results found for: Gibsonjoe Dang     Most Recent UA Lab Results   Component Value Date/Time    COLORU YELLOW/STRAW 12/11/2022 07:50 AM    APPEARANCE CLOUDY 12/11/2022 07:50 AM    SPECGRAV 1.012 12/11/2022 07:50 AM    LABPH 6.0 12/11/2022 07:50 AM    PROTEINU Negative 12/11/2022 07:50 AM    GLUCOSEU Negative 12/11/2022 07:50 AM    KETUA TRACE 12/11/2022 07:50 AM    BILIRUBINUR Negative 12/11/2022 07:50 AM    BILIRUBINUR Negative 02/20/2022 08:03 PM    BLOODU Negative 12/11/2022 07:50 AM    UROBILINOGEN 0.2 12/11/2022 07:50 AM    NITRU Negative 12/11/2022 07:50 AM    LEUKOCYTESUR LARGE 12/11/2022 07:50 AM    WBCUA  12/11/2022 07:50 AM    RBCUA 0-5 12/11/2022 07:50 AM    EPITHUA 10-20 12/11/2022 07:50 AM    BACTERIA 4+ 12/11/2022 07:50 AM    LABCAST 0-2 12/11/2022 07:50 AM    MUCUS 0 04/16/2022 03:49 PM        Recent Labs     12/11/22  0836 12/11/22  0750 12/11/22  0747   CULTURE NO GROWTH 3 DAYS 10,000 to 50,000 COLONIES/mL MIXED SKIN CANDIE ISOLATED  INCLUDING   STREPTOCOCCI, BETA HEMOLYTIC GROUP B  GROUP B STREPTOCOCCI Itching and Rash       There is no immunization history on file for this patient. Recent Vital Data:  Patient Vitals for the past 24 hrs:   Temp Pulse Resp BP SpO2   12/14/22 1140 99 °F (37.2 °C) 67 20 (!) 135/90 100 %   12/14/22 0801 98.2 °F (36.8 °C) 76 18 121/75 96 %   12/14/22 0407 97.2 °F (36.2 °C) 61 17 106/71 97 %   12/14/22 0007 98.1 °F (36.7 °C) 61 18 114/72 98 %   12/13/22 2013 98.6 °F (37 °C) 73 18 114/77 99 %   12/13/22 1513 97.7 °F (36.5 °C) 64 18 112/68 98 %       Oxygen Therapy  SpO2: 100 %  Pulse via Oximetry: 93 beats per minute  O2 Device: None (Room air)    Estimated body mass index is 27.46 kg/m² as calculated from the following:    Height as of this encounter: 5' 5\" (1.651 m). Weight as of this encounter: 165 lb (74.8 kg). Intake/Output Summary (Last 24 hours) at 12/14/2022 1447  Last data filed at 12/14/2022 1410  Gross per 24 hour   Intake 595 ml   Output --   Net 595 ml         Physical Exam:    General:    Well nourished. No overt distress  Head:  Normocephalic, atraumatic  Eyes:  Sclerae appear normal.  Pupils equally round. HENT:  Nares appear normal, no drainage. Moist mucous membranes  Neck:  No restricted ROM. Trachea midline  CV:   RRR. No m/r/g. No JVD  Lungs:   CTAB. No wheezing, rhonchi, or rales. Respirations even, unlabored  Abdomen:   Soft, nontender, nondistended. Extremities: Warm and dry. No cyanosis or clubbing. No edema. Skin:     Psoriasis skin changes . Inguinal /Pubic region erythema, nontender  Neuro:  CN II-XII grossly intact. Psych:  Normal mood and affect.     Signed:  Merlinda Kohler, MD

## 2022-12-14 NOTE — CONSULTS
GYN CONSULT:  Consulted earlier today to evaluate vulva and perineum for possible abscess and need for I and D. 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 Patient with long standing Hx of psoriasis, but currently has no medicine for this. Admitted with UTI dx and now on IV antiBx. Reports history of hydradenitis suppurativa many years ago, but does not believe she has this now. Exam performed with chaperone: Psoriasis present on numerous typical skin areas and some not so typical. She has very extensive psoriasis throughout her mons pubis and perineum, involving very swollen bilateral labia. This is due to some dependant swelling. There is no abscess. Probable  secondary cellulitis - being treated for this. She does not need incision and drainage of the vulva. May benefit from topical psoriasis steroid cream for pain symptom relief. Will be available if concern for abscess formation increases.   Sahra Samuels MD [facility] OB/GYN

## 2022-12-14 NOTE — DISCHARGE INSTRUCTIONS
Advised to take over the counter Probiotic. Yogurt has lot of probiotic. Follow up with Dermatology as out pt.

## 2022-12-14 NOTE — PROGRESS NOTES
's visit attempted. Patient was occupied.  follow-up is planned.      Rashawn Huber 66  Board Certified

## 2022-12-14 NOTE — CARE COORDINATION
Pt to d/c home today. She does not have insurance. CM had two medications vouchered at the [place] [address]: (1) Doxycycline 100 mg tab = $ 15.98 and (2) Amoxicillin-clavutanate 875-125 mg tab = $18.98 for a total of $34.96. Pt requested transportation assistance. Roundtrip scheduled to take pt to the [place] [address] and then to pick her up and take her home. Pt ambulated to the hospital when admitted. Pt declined information about PT PAL or [employer]. She was extremely anxious to be d/c. No other supportive care needs identified. Milestones met. Pt agrees with d/c plan. 12/11/22 1801   Service Assessment   Patient Orientation Alert and Oriented;Person;Place; Self   Cognition Alert   History Provided By Patient;Medical Record   Primary Caregiver Self   Support Systems Friends/Neighbors   Patient's Healthcare Decision Maker is: Legal Next of Kin   PCP Verified by CM No  (No PCP)   Prior Functional Level Independent in ADLs/IADLs   Current Functional Level Independent in ADLs/IADLs   Can patient return to prior living arrangement Yes   Ability to make needs known: Good   Family able to assist with home care needs: Other (comment)  (Poor support)   Would you like for me to discuss the discharge plan with any other family members/significant others, and if so, who?  No   Financial Resources Other (Comment)  (No insurance)   Social/Functional History   Lives With Alone   Type of Home Apartment   Receives Help From Other (comment)  (Poor support)   ADL Assistance Independent   Homemaking Assistance Independent   Ambulation Assistance Independent   Transfer Assistance Independent   Active  No   Mode of Transportation Cab;Friends   Occupation Full time employment   Type of Occupation [employer]   Discharge Planning   Type of Residence [facility] Prior To Admission None   Potential Assistance Needed N/A   DME Ordered? No   Potential Assistance Purchasing Medications Yes   Type of Home Care Services None   Patient expects to be discharged to: Apartment   History of falls? 0   Services At/After Discharge   Transition of Care Consult (CM Consult) Discharge Planning;Medication Assistance;Transportation Assistance   Services At/After Discharge Transport   [employer] Information Provided? No   Mode of Transport at Discharge Other (see comment)  (Roundtrip)   Confirm Follow Up Transport Other (see comment)  (Roundtrip)   Condition of Participation: Discharge Planning   The Plan for Transition of Care is related to the following treatment goals: Pt to obtain care to become medically stable and to return with a safe transition. The Patient and/or Patient Representative was provided with a Choice of Provider? Patient   The Patient and/Or Patient Representative agree with the Discharge Plan? Yes   Freedom of Choice list was provided with basic dialogue that supports the patient's individualized plan of care/goals, treatment preferences, and shares the quality data associated with the providers?   Yes

## 2022-12-15 ENCOUNTER — APPOINTMENT (OUTPATIENT)
Dept: GENERAL RADIOLOGY | Age: 42
End: 2022-12-15

## 2022-12-15 ENCOUNTER — TELEPHONE (OUTPATIENT)
Dept: INTERNAL MEDICINE CLINIC | Facility: CLINIC | Age: 42
End: 2022-12-15

## 2022-12-15 ENCOUNTER — HOSPITAL ENCOUNTER (OUTPATIENT)
Age: 42
Setting detail: OBSERVATION
Discharge: HOME | End: 2022-12-16
Attending: EMERGENCY MEDICINE | Admitting: HOSPITALIST

## 2022-12-15 DIAGNOSIS — D72.829 LEUKOCYTOSIS, UNSPECIFIED TYPE: ICD-10-CM

## 2022-12-15 DIAGNOSIS — R53.83 OTHER FATIGUE: Primary | ICD-10-CM

## 2022-12-15 PROBLEM — R53.1 WEAKNESS: Status: ACTIVE | Noted: 2022-12-15

## 2022-12-15 LAB
ALBUMIN SERPL-MCNC: 3.4 G/DL (ref 3.5–5)
ALBUMIN/GLOB SERPL: 0.6 {RATIO} (ref 0.4–1.6)
ALP SERPL-CCNC: 100 U/L (ref 50–136)
ALT SERPL-CCNC: 64 U/L (ref 12–65)
ANION GAP SERPL CALC-SCNC: 5 MMOL/L (ref 2–11)
APPEARANCE UR: CLEAR
AST SERPL-CCNC: 18 U/L (ref 15–37)
BASOPHILS # BLD: 0.1 K/UL (ref 0–0.2)
BASOPHILS NFR BLD: 1 % (ref 0–2)
BILIRUB SERPL-MCNC: 0.2 MG/DL (ref 0.2–1.1)
BILIRUB UR QL: NEGATIVE
BUN SERPL-MCNC: 6 MG/DL (ref 6–23)
CALCIUM SERPL-MCNC: 9.1 MG/DL (ref 8.3–10.4)
CHLORIDE SERPL-SCNC: 107 MMOL/L (ref 101–110)
CO2 SERPL-SCNC: 24 MMOL/L (ref 21–32)
COLOR UR: NORMAL
CREAT SERPL-MCNC: 0.7 MG/DL (ref 0.6–1)
DIFFERENTIAL METHOD BLD: ABNORMAL
EOSINOPHIL # BLD: 0.3 K/UL (ref 0–0.8)
EOSINOPHIL NFR BLD: 2 % (ref 0.5–7.8)
ERYTHROCYTE [DISTWIDTH] IN BLOOD BY AUTOMATED COUNT: 13.7 % (ref 11.9–14.6)
GLOBULIN SER CALC-MCNC: 5.6 G/DL (ref 2.8–4.5)
GLUCOSE SERPL-MCNC: 81 MG/DL (ref 65–100)
GLUCOSE UR STRIP.AUTO-MCNC: NEGATIVE MG/DL
HCT VFR BLD AUTO: 42.6 % (ref 35.8–46.3)
HGB BLD-MCNC: 13.9 G/DL (ref 11.7–15.4)
HGB UR QL STRIP: NEGATIVE
IMM GRANULOCYTES # BLD AUTO: 0.1 K/UL (ref 0–0.5)
IMM GRANULOCYTES NFR BLD AUTO: 1 % (ref 0–5)
KETONES UR QL STRIP.AUTO: NEGATIVE MG/DL
LACTATE SERPL-SCNC: 0.9 MMOL/L (ref 0.4–2)
LEUKOCYTE ESTERASE UR QL STRIP.AUTO: NEGATIVE
LYMPHOCYTES # BLD: 1.7 K/UL (ref 0.5–4.6)
LYMPHOCYTES NFR BLD: 12 % (ref 13–44)
MCH RBC QN AUTO: 29 PG (ref 26.1–32.9)
MCHC RBC AUTO-ENTMCNC: 32.6 G/DL (ref 31.4–35)
MCV RBC AUTO: 88.9 FL (ref 82–102)
MONOCYTES # BLD: 1.2 K/UL (ref 0.1–1.3)
MONOCYTES NFR BLD: 9 % (ref 4–12)
NEUTS SEG # BLD: 10.1 K/UL (ref 1.7–8.2)
NEUTS SEG NFR BLD: 75 % (ref 43–78)
NITRITE UR QL STRIP.AUTO: NEGATIVE
NRBC # BLD: 0 K/UL (ref 0–0.2)
PH UR STRIP: 6 [PH] (ref 5–9)
PLATELET # BLD AUTO: 363 K/UL (ref 150–450)
PMV BLD AUTO: 10.1 FL (ref 9.4–12.3)
POTASSIUM SERPL-SCNC: 3.6 MMOL/L (ref 3.5–5.1)
PROT SERPL-MCNC: 9 G/DL (ref 6.3–8.2)
PROT UR STRIP-MCNC: NEGATIVE MG/DL
RBC # BLD AUTO: 4.79 M/UL (ref 4.05–5.2)
SODIUM SERPL-SCNC: 136 MMOL/L (ref 133–143)
SP GR UR REFRACTOMETRY: <1.005 (ref 1–1.02)
UROBILINOGEN UR QL STRIP.AUTO: 0.2 EU/DL (ref 0.2–1)
WBC # BLD AUTO: 13.4 K/UL (ref 4.3–11.1)

## 2022-12-15 PROCEDURE — 87086 URINE CULTURE/COLONY COUNT: CPT

## 2022-12-15 PROCEDURE — 87040 BLOOD CULTURE FOR BACTERIA: CPT

## 2022-12-15 PROCEDURE — 2500000003 HC RX 250 WO HCPCS

## 2022-12-15 PROCEDURE — 6360000002 HC RX W HCPCS

## 2022-12-15 PROCEDURE — 80053 COMPREHEN METABOLIC PANEL: CPT

## 2022-12-15 PROCEDURE — 81003 URINALYSIS AUTO W/O SCOPE: CPT

## 2022-12-15 PROCEDURE — 96365 THER/PROPH/DIAG IV INF INIT: CPT

## 2022-12-15 PROCEDURE — 2580000003 HC RX 258

## 2022-12-15 PROCEDURE — G0378 HOSPITAL OBSERVATION PER HR: HCPCS

## 2022-12-15 PROCEDURE — 96367 TX/PROPH/DG ADDL SEQ IV INF: CPT

## 2022-12-15 PROCEDURE — 71046 X-RAY EXAM CHEST 2 VIEWS: CPT

## 2022-12-15 PROCEDURE — 96375 TX/PRO/DX INJ NEW DRUG ADDON: CPT

## 2022-12-15 PROCEDURE — 99285 EMERGENCY DEPT VISIT HI MDM: CPT

## 2022-12-15 PROCEDURE — 85025 COMPLETE CBC W/AUTO DIFF WBC: CPT

## 2022-12-15 PROCEDURE — 83605 ASSAY OF LACTIC ACID: CPT

## 2022-12-15 RX ORDER — DEXTROSE AND SODIUM CHLORIDE 5; .45 G/100ML; G/100ML
INJECTION, SOLUTION INTRAVENOUS CONTINUOUS
Status: DISCONTINUED | OUTPATIENT
Start: 2022-12-16 | End: 2022-12-16 | Stop reason: HOSPADM

## 2022-12-15 RX ORDER — SODIUM CHLORIDE 9 MG/ML
INJECTION, SOLUTION INTRAVENOUS PRN
Status: DISCONTINUED | OUTPATIENT
Start: 2022-12-15 | End: 2022-12-16 | Stop reason: HOSPADM

## 2022-12-15 RX ORDER — ONDANSETRON 2 MG/ML
4 INJECTION INTRAMUSCULAR; INTRAVENOUS EVERY 6 HOURS PRN
Status: DISCONTINUED | OUTPATIENT
Start: 2022-12-15 | End: 2022-12-16 | Stop reason: HOSPADM

## 2022-12-15 RX ORDER — 0.9 % SODIUM CHLORIDE 0.9 %
30 INTRAVENOUS SOLUTION INTRAVENOUS ONCE
Status: COMPLETED | OUTPATIENT
Start: 2022-12-15 | End: 2022-12-15

## 2022-12-15 RX ORDER — ACETAMINOPHEN 325 MG/1
650 TABLET ORAL EVERY 6 HOURS PRN
Status: DISCONTINUED | OUTPATIENT
Start: 2022-12-15 | End: 2022-12-16 | Stop reason: HOSPADM

## 2022-12-15 RX ORDER — ONDANSETRON 4 MG/1
4 TABLET, ORALLY DISINTEGRATING ORAL EVERY 8 HOURS PRN
Status: DISCONTINUED | OUTPATIENT
Start: 2022-12-15 | End: 2022-12-16 | Stop reason: HOSPADM

## 2022-12-15 RX ORDER — SODIUM CHLORIDE 0.9 % (FLUSH) 0.9 %
5-40 SYRINGE (ML) INJECTION PRN
Status: DISCONTINUED | OUTPATIENT
Start: 2022-12-15 | End: 2022-12-16 | Stop reason: HOSPADM

## 2022-12-15 RX ORDER — SODIUM CHLORIDE 0.9 % (FLUSH) 0.9 %
5-40 SYRINGE (ML) INJECTION EVERY 12 HOURS SCHEDULED
Status: DISCONTINUED | OUTPATIENT
Start: 2022-12-16 | End: 2022-12-16 | Stop reason: HOSPADM

## 2022-12-15 RX ORDER — POLYETHYLENE GLYCOL 3350 17 G/17G
17 POWDER, FOR SOLUTION ORAL DAILY PRN
Status: DISCONTINUED | OUTPATIENT
Start: 2022-12-15 | End: 2022-12-16 | Stop reason: HOSPADM

## 2022-12-15 RX ORDER — ONDANSETRON 2 MG/ML
4 INJECTION INTRAMUSCULAR; INTRAVENOUS
Status: COMPLETED | OUTPATIENT
Start: 2022-12-15 | End: 2022-12-15

## 2022-12-15 RX ORDER — ACETAMINOPHEN 650 MG/1
650 SUPPOSITORY RECTAL EVERY 6 HOURS PRN
Status: DISCONTINUED | OUTPATIENT
Start: 2022-12-15 | End: 2022-12-16 | Stop reason: HOSPADM

## 2022-12-15 RX ORDER — MAGNESIUM HYDROXIDE/ALUMINUM HYDROXICE/SIMETHICONE 120; 1200; 1200 MG/30ML; MG/30ML; MG/30ML
30 SUSPENSION ORAL EVERY 6 HOURS PRN
Status: DISCONTINUED | OUTPATIENT
Start: 2022-12-15 | End: 2022-12-16 | Stop reason: HOSPADM

## 2022-12-15 RX ORDER — ENOXAPARIN SODIUM 100 MG/ML
40 INJECTION SUBCUTANEOUS DAILY
Status: DISCONTINUED | OUTPATIENT
Start: 2022-12-16 | End: 2022-12-16 | Stop reason: HOSPADM

## 2022-12-15 RX ADMIN — ONDANSETRON 4 MG: 2 INJECTION INTRAMUSCULAR; INTRAVENOUS at 22:51

## 2022-12-15 RX ADMIN — SODIUM CHLORIDE 1710 ML: 9 INJECTION, SOLUTION INTRAVENOUS at 21:07

## 2022-12-15 RX ADMIN — DOXYCYCLINE 100 MG: 100 INJECTION, POWDER, LYOPHILIZED, FOR SOLUTION INTRAVENOUS at 22:51

## 2022-12-15 RX ADMIN — PIPERACILLIN AND TAZOBACTAM 4500 MG: 4; .5 INJECTION, POWDER, LYOPHILIZED, FOR SOLUTION INTRAVENOUS at 22:25

## 2022-12-15 ASSESSMENT — ENCOUNTER SYMPTOMS
SHORTNESS OF BREATH: 0
VOMITING: 1
NAUSEA: 1
COLOR CHANGE: 0
DIARRHEA: 0
ABDOMINAL PAIN: 0

## 2022-12-15 ASSESSMENT — PAIN - FUNCTIONAL ASSESSMENT: PAIN_FUNCTIONAL_ASSESSMENT: 0-10

## 2022-12-15 ASSESSMENT — PAIN DESCRIPTION - LOCATION: LOCATION: GENERALIZED

## 2022-12-15 ASSESSMENT — PAIN SCALES - GENERAL: PAINLEVEL_OUTOF10: 8

## 2022-12-15 NOTE — TELEPHONE ENCOUNTER
Called patient to schedule TCV appt following discharge from [facility] 12/14/2022. Dx UTI (urinary tract infection) with pyuria [N39.0]  Cellulitis, unspecified cellulitis site [L03.90]     Per discharge patient to follow up with Dermatology in 2 weeks. Patient states she would like to est with a PCP but will have to call back later because she is at work.

## 2022-12-16 VITALS
OXYGEN SATURATION: 97 % | TEMPERATURE: 98.4 F | HEART RATE: 69 BPM | BODY MASS INDEX: 27.49 KG/M2 | DIASTOLIC BLOOD PRESSURE: 68 MMHG | WEIGHT: 165 LBS | HEIGHT: 65 IN | RESPIRATION RATE: 16 BRPM | SYSTOLIC BLOOD PRESSURE: 107 MMHG

## 2022-12-16 PROBLEM — R53.1 WEAKNESS: Status: RESOLVED | Noted: 2022-12-15 | Resolved: 2022-12-16

## 2022-12-16 PROBLEM — N39.0 UTI (URINARY TRACT INFECTION) WITH PYURIA: Status: RESOLVED | Noted: 2022-12-11 | Resolved: 2022-12-16

## 2022-12-16 LAB
ANION GAP SERPL CALC-SCNC: 5 MMOL/L (ref 2–11)
BACTERIA SPEC CULT: NORMAL
BACTERIA SPEC CULT: NORMAL
BASOPHILS # BLD: 0.1 K/UL (ref 0–0.2)
BASOPHILS NFR BLD: 1 % (ref 0–2)
BUN SERPL-MCNC: 5 MG/DL (ref 6–23)
CALCIUM SERPL-MCNC: 8.1 MG/DL (ref 8.3–10.4)
CHLORIDE SERPL-SCNC: 112 MMOL/L (ref 101–110)
CO2 SERPL-SCNC: 24 MMOL/L (ref 21–32)
CREAT SERPL-MCNC: 0.6 MG/DL (ref 0.6–1)
DIFFERENTIAL METHOD BLD: ABNORMAL
EOSINOPHIL # BLD: 0.2 K/UL (ref 0–0.8)
EOSINOPHIL NFR BLD: 2 % (ref 0.5–7.8)
ERYTHROCYTE [DISTWIDTH] IN BLOOD BY AUTOMATED COUNT: 13.8 % (ref 11.9–14.6)
GLUCOSE SERPL-MCNC: 93 MG/DL (ref 65–100)
HCT VFR BLD AUTO: 37.8 % (ref 35.8–46.3)
HGB BLD-MCNC: 12.1 G/DL (ref 11.7–15.4)
IMM GRANULOCYTES # BLD AUTO: 0.1 K/UL (ref 0–0.5)
IMM GRANULOCYTES NFR BLD AUTO: 1 % (ref 0–5)
LYMPHOCYTES # BLD: 1.2 K/UL (ref 0.5–4.6)
LYMPHOCYTES NFR BLD: 12 % (ref 13–44)
MCH RBC QN AUTO: 28.7 PG (ref 26.1–32.9)
MCHC RBC AUTO-ENTMCNC: 32 G/DL (ref 31.4–35)
MCV RBC AUTO: 89.8 FL (ref 82–102)
MONOCYTES # BLD: 0.8 K/UL (ref 0.1–1.3)
MONOCYTES NFR BLD: 8 % (ref 4–12)
NEUTS SEG # BLD: 7.8 K/UL (ref 1.7–8.2)
NEUTS SEG NFR BLD: 76 % (ref 43–78)
NRBC # BLD: 0 K/UL (ref 0–0.2)
PLATELET # BLD AUTO: 289 K/UL (ref 150–450)
PMV BLD AUTO: 10 FL (ref 9.4–12.3)
POTASSIUM SERPL-SCNC: 4 MMOL/L (ref 3.5–5.1)
RBC # BLD AUTO: 4.21 M/UL (ref 4.05–5.2)
SERVICE CMNT-IMP: NORMAL
SERVICE CMNT-IMP: NORMAL
SODIUM SERPL-SCNC: 141 MMOL/L (ref 133–143)
WBC # BLD AUTO: 10.2 K/UL (ref 4.3–11.1)

## 2022-12-16 PROCEDURE — 6360000002 HC RX W HCPCS: Performed by: HOSPITALIST

## 2022-12-16 PROCEDURE — 2580000003 HC RX 258: Performed by: HOSPITALIST

## 2022-12-16 PROCEDURE — 96366 THER/PROPH/DIAG IV INF ADDON: CPT

## 2022-12-16 PROCEDURE — G0378 HOSPITAL OBSERVATION PER HR: HCPCS

## 2022-12-16 PROCEDURE — 85025 COMPLETE CBC W/AUTO DIFF WBC: CPT

## 2022-12-16 PROCEDURE — 80048 BASIC METABOLIC PNL TOTAL CA: CPT

## 2022-12-16 PROCEDURE — 6370000000 HC RX 637 (ALT 250 FOR IP): Performed by: HOSPITALIST

## 2022-12-16 RX ADMIN — CEFTRIAXONE 1000 MG: 1 INJECTION, POWDER, FOR SOLUTION INTRAMUSCULAR; INTRAVENOUS at 00:29

## 2022-12-16 RX ADMIN — DEXTROSE AND SODIUM CHLORIDE: 5; 450 INJECTION, SOLUTION INTRAVENOUS at 00:29

## 2022-12-16 RX ADMIN — ACETAMINOPHEN 650 MG: 325 TABLET, FILM COATED ORAL at 03:14

## 2022-12-16 ASSESSMENT — PAIN SCALES - GENERAL: PAINLEVEL_OUTOF10: 4

## 2022-12-16 NOTE — PROGRESS NOTES
[facility] EMERGENCY DEPT  [address] [place] [address]  Phone: [phone]  Fax: [phone]               December 16, 2022    Patient: Leida Gonzalez   YOB: 1980   Date of Visit: 12/15/2022       To Whom It May Concern:    Demetri Dumas was seen and treated in our emergency department on 12/15/2022. She may return to work on Monday, 12/19/2022, with no restrictions.       Sincerely,       Juana Suresh MD         Signature:__________________________________

## 2022-12-16 NOTE — ED TRIAGE NOTES
Pt reports that she was dced from hospital yesterday post treatment for sepsis UTI and cellulitis in right groin, currently still taking abx, now c/o Nausea, difficulty urinating, generalized weakness, body chills, Denies V/D. Pt is tearful and anxious in triage, NAD.

## 2022-12-16 NOTE — ED NOTES
I have reviewed discharge instructions with the patient. The patient verbalized understanding. Patient left ED via Discharge Method: ambulatory to Home with home. Opportunity for questions and clarification provided. Patient given 0 scripts. To continue your aftercare when you leave the hospital, you may receive an automated call from our care team to check in on how you are doing. This is a free service and part of our promise to provide the best care and service to meet your aftercare needs.  If you have questions, or wish to unsubscribe from this service please call [phone]. Thank you for Choosing our [facility] Emergency Department.         Boy St RN  12/16/22 7411

## 2022-12-16 NOTE — ED NOTES
Attempted to obtain blood cultures multiple times and was unsuccessful d/t pt moving arm     Lamberto Mabry RN  12/15/22 5439

## 2022-12-16 NOTE — DISCHARGE SUMMARY
Hospitalist Discharge Summary   Admit Date:  12/15/2022  7:55 PM   DC Note date: 2022  Name:  Agustín Goodson   Age:  38 y.o. Sex:  female  :  1980   MRN:  [id number]   Room:  [place] [address]  PCP:  Md Tavares Figueroa (Inactive)    Presenting Complaint: Generalized Body Aches and Urinary Tract Infection     Initial Admission Diagnosis: Weakness [R53.1]     Problem List for this Hospitalization (present on admission):    Principal Problem (Resolved):    Weakness  Active Problems:    * No active hospital problems. *  Resolved Problems:    UTI (urinary tract infection) with pyuria      Hospital Course:  Ms. Marquita Steel is a 39 y/o WF with no significant PMH who was recently admitted to our service for UTI and L inguinal cellulitis. CT a/p with contrast on  was unremarkable. She was treated with IV antibiotics. She was evaluated by both General Surgery and OB/GYN. No surgical intervention was recommended. There was no concern for vulvar/labial abscess after GYN evaluation. Blood cultures were negative, urine grew streptococcus. She was discharged with doxycycline and amoxicillin on . She felt well until the following day when she developed nausea and lower abdominal discomfort. She returned to the ER. Labs were notable for a mild leukocytosis of 13k. She was afebrile. Other labs were normal. CXR is clear. UA is also clear. Blood cultures were re-drawn and she was treated with doxycycline and Zosyn. She was then admitted back to the Hospitalist service. I evaluated the patient at the bedside in the ER this morning. I had ordered a swab for COVID/RSV/influenza however the patient adamantly refused the test because she was confident it is not COVID. I explained how COVID can cause numerous non-respiratory issues/symptoms, but she continued to refuse. Her WBCs have normalized and remains afebrile and on RA. I recommend she establish care with a PCP and even Dermatology for her psoriasis.  She became very dismissive of me as well as case management, stating she would just go to [facility]. We will notify her if her cultures grow anything concerning. She is medically stable for discharge today. Disposition: Home  Diet: ADULT DIET; Regular  Code Status: Full Code    Follow Ups:  Establish with PCP    Time spent in patient discharge and coordination 35 minutes. Follow up labs/diagnostics (ultimately defer to outpatient provider):  N/A    Plan was discussed with patient, RN, CM. All questions answered. Patient was stable at time of discharge. Instructions given to call a physician or return if any concerns. Current Discharge Medication List        CONTINUE these medications which have NOT CHANGED    Details   amoxicillin-clavulanate (AUGMENTIN) 875-125 MG per tablet Take 1 tablet by mouth 2 times daily for 7 days  Qty: 14 tablet, Refills: 0      doxycycline hyclate (VIBRA-TABS) 100 MG tablet Take 1 tablet by mouth 2 times daily for 7 days  Qty: 14 tablet, Refills: 0             Procedures done this admission:  * No surgery found *    Consults this admission:  None    Echocardiogram results:  No results found for this or any previous visit. Diagnostic Imaging/Tests:   XR CHEST (2 VW)    Result Date: 12/15/2022  Normal chest x-ray.     CT ABDOMEN PELVIS W IV CONTRAST Additional Contrast? None    Result Date: 12/11/2022  No acute findings in abdomen or pelvis If there are any questions about this report, I can be reached on [employer] or at [phone]     XR CHEST PORTABLE    Result Date: 12/11/2022  No acute findings in the chest        Labs: Results:       BMP, Mg, Phos Recent Labs     12/14/22  0553 12/15/22  2005 12/16/22  0642    136 141   K 4.4 3.6 4.0   * 107 112*   CO2 24 24 24   ANIONGAP 4 5 5   BUN 5* 6 5*   CREATININE 0.60 0.70 0.60   LABGLOM >60 >60 >60   CALCIUM 8.2* 9.1 8.1*   GLUCOSE 91 81 93      CBC Recent Labs     12/14/22  0553 12/15/22  2005 12/16/22  0642   WBC 8.6 13.4* 10.2 RBC 4.71 4.79 4.21   HGB 13.6 13.9 12.1   HCT 44.2 42.6 37.8   MCV 93.8 88.9 89.8   MCH 28.9 29.0 28.7   MCHC 30.8* 32.6 32.0   RDW 13.8 13.7 13.8    363 289   MPV 10.1 10.1 10.0   NRBC 0.00 0.00 0.00   SEGS 69 75 76   LYMPHOPCT 13 12* 12*   EOSRELPCT 6 2 2   MONOPCT 10 9 8   BASOPCT 1 1 1   IMMGRAN 1 1 1   SEGSABS 6.0 10.1* 7.8   LYMPHSABS 1.2 1.7 1.2   EOSABS 0.5 0.3 0.2   MONOSABS 0.8 1.2 0.8   BASOSABS 0.1 0.1 0.1   ABSIMMGRAN 0.1 0.1 0.1      LFT Recent Labs     12/15/22  2005   BILITOT 0.2   ALKPHOS 100   AST 18   ALT 64   PROT 9.0*   LABALBU 3.4*   GLOB 5.6*      Cardiac  Lab Results   Component Value Date/Time    TROPHS 4.3 08/11/2022 10:46 PM    TROPHS 4.1 08/11/2022 08:00 PM    TROPHS 6.4 01/15/2022 10:10 PM      Coags No results found for: PROTIME, INR, APTT   A1c No results found for: LABA1C, EAG   Lipids No results found for: CHOL, LDLCALC, LABVLDL, HDL, CHOLHDLRATIO, TRIG   Thyroid  No results found for: Noelle Myers     Most Recent UA Lab Results   Component Value Date/Time    COLORU YELLOW/STRAW 12/15/2022 08:41 PM    APPEARANCE CLEAR 12/15/2022 08:41 PM    SPECGRAV <1.005 12/15/2022 08:41 PM    LABPH 6.0 12/15/2022 08:41 PM    PROTEINU Negative 12/15/2022 08:41 PM    GLUCOSEU Negative 12/15/2022 08:41 PM    KETUA Negative 12/15/2022 08:41 PM    BILIRUBINUR Negative 12/15/2022 08:41 PM    BILIRUBINUR Negative 02/20/2022 08:03 PM    BLOODU Negative 12/15/2022 08:41 PM    UROBILINOGEN 0.2 12/15/2022 08:41 PM    NITRU Negative 12/15/2022 08:41 PM    LEUKOCYTESUR Negative 12/15/2022 08:41 PM    WBCUA  12/11/2022 07:50 AM    RBCUA 0-5 12/11/2022 07:50 AM    EPITHUA 10-20 12/11/2022 07:50 AM    BACTERIA 4+ 12/11/2022 07:50 AM    LABCAST 0-2 12/11/2022 07:50 AM    MUCUS 0 04/16/2022 03:49 PM        Recent Labs     12/15/22  2327 12/15/22  2041 12/11/22  0836 12/11/22  0750 12/11/22  0747   CULTURE PENDING No growth after short period of incubation.  Further results to follow after overnight incubation. NO GROWTH 5 DAYS 10,000 to 50,000 COLONIES/mL MIXED SKIN CANDIE ISOLATED  INCLUDING   STREPTOCOCCI, BETA HEMOLYTIC GROUP B  GROUP B STREPTOCOCCI REMAIN UNIVERSALLY SUSCEPTIBLE TO PENICILLIN, AMPICILLIN, AND CEFAZOLIN. RESISTANCE TO CLINDAMYCIN AND ERYTHROMYCIN CAN OCCUR. PLEASE CONTACT LABORATORY WITHIN 48 HOURS IF ERYTHROMYCIN AND CLINDAMYCIN SUSCEPTIBILITY TESTING IS NEEDED. The number of Streptococci Group B is not suggestive of a urinary tract infection. However, if this patient is pregnant, the presence of any amount of Streptococci Group B may have implications for the fetus and is noted in this report.   * NO GROWTH 5 DAYS       All Labs from Last 24 Hrs:  Recent Results (from the past 24 hour(s))   Comprehensive Metabolic Panel    Collection Time: 12/15/22  8:05 PM   Result Value Ref Range    Sodium 136 133 - 143 mmol/L    Potassium 3.6 3.5 - 5.1 mmol/L    Chloride 107 101 - 110 mmol/L    CO2 24 21 - 32 mmol/L    Anion Gap 5 2 - 11 mmol/L    Glucose 81 65 - 100 mg/dL    BUN 6 6 - 23 MG/DL    Creatinine 0.70 0.6 - 1.0 MG/DL    Est, Glom Filt Rate >60 >60 ml/min/1.73m2    Calcium 9.1 8.3 - 10.4 MG/DL    Total Bilirubin 0.2 0.2 - 1.1 MG/DL    ALT 64 12 - 65 U/L    AST 18 15 - 37 U/L    Alk Phosphatase 100 50 - 136 U/L    Total Protein 9.0 (H) 6.3 - 8.2 g/dL    Albumin 3.4 (L) 3.5 - 5.0 g/dL    Globulin 5.6 (H) 2.8 - 4.5 g/dL    Albumin/Globulin Ratio 0.6 0.4 - 1.6     CBC with Auto Differential    Collection Time: 12/15/22  8:05 PM   Result Value Ref Range    WBC 13.4 (H) 4.3 - 11.1 K/uL    RBC 4.79 4.05 - 5.2 M/uL    Hemoglobin 13.9 11.7 - 15.4 g/dL    Hematocrit 42.6 35.8 - 46.3 %    MCV 88.9 82 - 102 FL    MCH 29.0 26.1 - 32.9 PG    MCHC 32.6 31.4 - 35.0 g/dL    RDW 13.7 11.9 - 14.6 %    Platelets 413 183 - 299 K/uL    MPV 10.1 9.4 - 12.3 FL    nRBC 0.00 0.0 - 0.2 K/uL    Differential Type AUTOMATED      Seg Neutrophils 75 43 - 78 %    Lymphocytes 12 (L) 13 - 44 %    Monocytes 9 4.0 - 12.0 % Eosinophils % 2 0.5 - 7.8 %    Basophils 1 0.0 - 2.0 %    Immature Granulocytes 1 0.0 - 5.0 %    Segs Absolute 10.1 (H) 1.7 - 8.2 K/UL    Absolute Lymph # 1.7 0.5 - 4.6 K/UL    Absolute Mono # 1.2 0.1 - 1.3 K/UL    Absolute Eos # 0.3 0.0 - 0.8 K/UL    Basophils Absolute 0.1 0.0 - 0.2 K/UL    Absolute Immature Granulocyte 0.1 0.0 - 0.5 K/UL   Lactic Acid    Collection Time: 12/15/22  8:05 PM   Result Value Ref Range    Lactic Acid, Plasma 0.9 0.4 - 2.0 MMOL/L   Urinalysis w rflx microscopic    Collection Time: 12/15/22  8:41 PM   Result Value Ref Range    Color, UA YELLOW/STRAW      Appearance CLEAR      Specific Gravity, UA <1.005 1.001 - 1.023    pH, Urine 6.0 5.0 - 9.0      Protein, UA Negative NEG mg/dL    Glucose, UA Negative mg/dL    Ketones, Urine Negative NEG mg/dL    Bilirubin Urine Negative NEG      Blood, Urine Negative NEG      Urobilinogen, Urine 0.2 0.2 - 1.0 EU/dL    Nitrite, Urine Negative NEG      Leukocyte Esterase, Urine Negative NEG     Culture, Urine    Collection Time: 12/15/22  8:41 PM    Specimen: Urine, clean catch   Result Value Ref Range    Special Requests NO SPECIAL REQUESTS      Culture        No growth after short period of incubation. Further results to follow after overnight incubation.    Culture, Blood 1    Collection Time: 12/15/22 11:27 PM    Specimen: Blood   Result Value Ref Range    Special Requests LEFT  Antecubital        Culture PENDING    Basic Metabolic Panel w/ Reflex to MG    Collection Time: 12/16/22  6:42 AM   Result Value Ref Range    Sodium 141 133 - 143 mmol/L    Potassium 4.0 3.5 - 5.1 mmol/L    Chloride 112 (H) 101 - 110 mmol/L    CO2 24 21 - 32 mmol/L    Anion Gap 5 2 - 11 mmol/L    Glucose 93 65 - 100 mg/dL    BUN 5 (L) 6 - 23 MG/DL    Creatinine 0.60 0.6 - 1.0 MG/DL    Est, Glom Filt Rate >60 >60 ml/min/1.73m2    Calcium 8.1 (L) 8.3 - 10.4 MG/DL   CBC with Auto Differential    Collection Time: 12/16/22  6:42 AM   Result Value Ref Range    WBC 10.2 4.3 - 11.1 K/uL    RBC 4.21 4.05 - 5.2 M/uL    Hemoglobin 12.1 11.7 - 15.4 g/dL    Hematocrit 37.8 35.8 - 46.3 %    MCV 89.8 82 - 102 FL    MCH 28.7 26.1 - 32.9 PG    MCHC 32.0 31.4 - 35.0 g/dL    RDW 13.8 11.9 - 14.6 %    Platelets 757 217 - 794 K/uL    MPV 10.0 9.4 - 12.3 FL    nRBC 0.00 0.0 - 0.2 K/uL    Differential Type AUTOMATED      Seg Neutrophils 76 43 - 78 %    Lymphocytes 12 (L) 13 - 44 %    Monocytes 8 4.0 - 12.0 %    Eosinophils % 2 0.5 - 7.8 %    Basophils 1 0.0 - 2.0 %    Immature Granulocytes 1 0.0 - 5.0 %    Segs Absolute 7.8 1.7 - 8.2 K/UL    Absolute Lymph # 1.2 0.5 - 4.6 K/UL    Absolute Mono # 0.8 0.1 - 1.3 K/UL    Absolute Eos # 0.2 0.0 - 0.8 K/UL    Basophils Absolute 0.1 0.0 - 0.2 K/UL    Absolute Immature Granulocyte 0.1 0.0 - 0.5 K/UL       Allergies   Allergen Reactions    Prednisone Itching    Vancomycin Itching and Rash       There is no immunization history on file for this patient. Recent Vital Data:  Patient Vitals for the past 24 hrs:   Temp Pulse Resp BP SpO2   12/16/22 0644 -- -- -- 107/68 97 %   12/16/22 0630 -- 69 16 107/68 98 %   12/16/22 0300 -- 67 18 102/69 97 %   12/16/22 0002 -- 77 16 119/74 95 %   12/15/22 2330 -- 83 18 126/69 95 %   12/15/22 2240 -- -- -- (!) 112/99 99 %   12/15/22 1935 98.4 °F (36.9 °C) 90 20 (!) 140/92 99 %       Oxygen Therapy  SpO2: 97 %  Pulse Oximeter Device Mode: Intermittent  O2 Device: None (Room air)    Estimated body mass index is 27.46 kg/m² as calculated from the following:    Height as of this encounter: 5' 5\" (1.651 m). Weight as of this encounter: 165 lb (74.8 kg). No intake or output data in the 24 hours ending 12/16/22 0955      Physical Exam:  General:    Well nourished. No overt distress. Anxious, irritable. Head:  Normocephalic, atraumatic  Eyes:  Sclerae appear normal.  Pupils equally round. HENT:  Nares appear normal, no drainage. Moist mucous membranes  Neck:  No restricted ROM. Trachea midline  CV:   RRR. No m/r/g.   No JVD  Lungs:   CTAB. No wheezing, rhonchi, or rales. Respirations even, unlabored  Abdomen:   Soft, nontender, nondistended. Extremities: Warm and dry. No cyanosis or clubbing. No edema. Skin:     Normal coloration. Psoriatic plaques noted to anterior abdominal wall, mons pubis, elbows. No appreciable inguinal erythema, fluctuance, skin breakdown, LAD or tenderness to palpation. Neuro:  CN II-XII grossly intact. Psych:  Dismissive, irritable, A/O x3. Signed:  Saint Crumb, MD    Part of this note may have been written by using a voice dictation software. The note has been proof read but may still contain some grammatical/other typographical errors.

## 2022-12-16 NOTE — H&P
Hospitalist History and Physical   Admit Date:  12/15/2022  7:55 PM   Name:  Fareed Spears   Age:  38 y.o. Sex:  female  :  1980   MRN:  [id number]   Room:  [facility]/    Presenting Complaint: Generalized Body Aches and Urinary Tract Infection     Reason(s) for Admission: Weakness [R53.1]       Assessment & Plan:     Principal Problem:    Weakness -77-year-old woman with no significant past medical history, sent admission for UTI, back again with nausea. White blood cell count slightly elevated but otherwise no obvious signs or symptoms of infection. Chest x-ray and repeat UA are benign, but patient strongly believes that something is wrong and must be discovered    Plan:   Admit medical bed, observation status  IV fluids overnight. Patient will also be given IV antibiotics due to ongoing nausea issues  Patient advised to find a PCP and follow-up with him or her regarding her chronic issues, especially with anxiety and stressors      Active Problems:    UTI (urinary tract infection) with pyuria  Plan:       Anticipated discharge needs:     Needs a PCP    Diet: regular  VTE ppx: lovenox  Code status: FULL      History of Present Illness:   Fareed Spears is a 38 y.o. female with medical history of  arthritis and psoriasis presents to the emergency department today with chief complaint of fatigue, nausea, \"not feeling right. \"  Starting today. Patient was discharged yesterday from the hospital after being treated for sepsis. Patient states she was given the option to stay 1 more day but told providers that she needed to go home to go to work. Patient denies fevers, chills, chest pain, shortness of breath, vomiting, diarrhea, abdominal pain or pelvic pain. Patient states she does feel fatigued and feels as though something is wrong. States she has been taking her prescribed antibiotics.     Chart review from last admission when she was seen by hospitalist as well as consultants gynecology and general surgery. Leukocytosis on CBC with a white count of 13.4, up from yesterday's white count yesterday of 8.6, a  No anemia noted  No concerning findings on CMP  Lactic acid within normal limits at 0.9  No leukocytosis, no blood, no nitrites on urine dip. We will send for urine culture. In ER, she had  blood blood cultures x2, IV doxycycline as patient has had a history of MRSA and patient is allergic to Vanco, and IV Zosyn. ER PA felt patient should be admitted & contacted  hospitalist via [employer]. On hospitalist arrival, patient very concerned about recent hospitalization as well as multiple ongoing stressors in her life. At this time her vitals are stable & she is afebrile. Mentions nausea but no vomiting so most likely due to recent p.o. antibiotic use. She has adequate p.o. intake. Review of Systems:  10 systems reviewed and negative except as noted in HPI. Past History:     Past Medical History:   Diagnosis Date    Arthritis     Psoriasis        Past Surgical History:   Procedure Laterality Date    GYN          ORTHOPEDIC SURGERY      cyst behind right knee        Social History     Tobacco Use    Smoking status: Every Day     Packs/day: 1.00     Types: Cigarettes    Smokeless tobacco: Never   Substance Use Topics    Alcohol use: No      Social History     Substance and Sexual Activity   Drug Use No       History reviewed. No pertinent family history. There is no immunization history on file for this patient.   Allergies   Allergen Reactions    Prednisone Itching    Vancomycin Itching and Rash     Prior to Admit Medications:  Current Outpatient Medications   Medication Instructions    amoxicillin-clavulanate (AUGMENTIN) 875-125 MG per tablet 1 tablet, Oral, 2 TIMES DAILY    doxycycline hyclate (VIBRA-TABS) 100 mg, Oral, 2 TIMES DAILY         Objective:   Patient Vitals for the past 24 hrs:   Temp Pulse Resp BP SpO2   12/15/22 1935 98.4 °F (36.9 °C) 90 20 (!) 140/92 99 %       Oxygen Therapy  SpO2: 99 %  Pulse Oximeter Device Mode: Intermittent  O2 Device: None (Room air)    Estimated body mass index is 27.46 kg/m² as calculated from the following:    Height as of this encounter: 5' 5\" (1.651 m). Weight as of this encounter: 165 lb (74.8 kg). No intake or output data in the 24 hours ending 12/15/22 2239      Physical Exam:    Blood pressure (!) 140/92, pulse 90, temperature 98.4 °F (36.9 °C), temperature source Oral, resp. rate 20, height 5' 5\" (1.651 m), weight 165 lb (74.8 kg), SpO2 99 %. General:    Well nourished. No acute distress, very anxious tearful and agitated  Head:  Normocephalic, atraumatic  Eyes:  Sclerae appear normal.  Pupils equally round. ENT:  Nares appear normal, no drainage. Moist oral mucosa  Neck:  No restricted ROM. Trachea midline   CV:   RRR. No m/r/g. No jugular venous distension. Lungs:   CTAB. No wheezing, rhonchi, or rales. Symmetric expansion. Abdomen: Bowel sounds present. Soft, nontender, nondistended. Extremities: No cyanosis or clubbing. No edema  Skin:     No rashes and normal coloration. Warm and dry. Neuro:  CN II-XII grossly intact. Sensation intact.   A&Ox3  Psych:  Tearful and agitated    I have personally reviewed labs and tests showing:  Recent Labs:  Recent Results (from the past 24 hour(s))   Comprehensive Metabolic Panel    Collection Time: 12/15/22  8:05 PM   Result Value Ref Range    Sodium 136 133 - 143 mmol/L    Potassium 3.6 3.5 - 5.1 mmol/L    Chloride 107 101 - 110 mmol/L    CO2 24 21 - 32 mmol/L    Anion Gap 5 2 - 11 mmol/L    Glucose 81 65 - 100 mg/dL    BUN 6 6 - 23 MG/DL    Creatinine 0.70 0.6 - 1.0 MG/DL    Est, Glom Filt Rate >60 >60 ml/min/1.73m2    Calcium 9.1 8.3 - 10.4 MG/DL    Total Bilirubin 0.2 0.2 - 1.1 MG/DL    ALT 64 12 - 65 U/L    AST 18 15 - 37 U/L    Alk Phosphatase 100 50 - 136 U/L    Total Protein 9.0 (H) 6.3 - 8.2 g/dL    Albumin 3.4 (L) 3.5 - 5.0 g/dL    Globulin 5.6 (H) 2.8 - 4.5 g/dL    Albumin/Globulin Ratio 0.6 0.4 - 1.6     CBC with Auto Differential    Collection Time: 12/15/22  8:05 PM   Result Value Ref Range    WBC 13.4 (H) 4.3 - 11.1 K/uL    RBC 4.79 4.05 - 5.2 M/uL    Hemoglobin 13.9 11.7 - 15.4 g/dL    Hematocrit 42.6 35.8 - 46.3 %    MCV 88.9 82 - 102 FL    MCH 29.0 26.1 - 32.9 PG    MCHC 32.6 31.4 - 35.0 g/dL    RDW 13.7 11.9 - 14.6 %    Platelets 742 191 - 171 K/uL    MPV 10.1 9.4 - 12.3 FL    nRBC 0.00 0.0 - 0.2 K/uL    Differential Type AUTOMATED      Seg Neutrophils 75 43 - 78 %    Lymphocytes 12 (L) 13 - 44 %    Monocytes 9 4.0 - 12.0 %    Eosinophils % 2 0.5 - 7.8 %    Basophils 1 0.0 - 2.0 %    Immature Granulocytes 1 0.0 - 5.0 %    Segs Absolute 10.1 (H) 1.7 - 8.2 K/UL    Absolute Lymph # 1.7 0.5 - 4.6 K/UL    Absolute Mono # 1.2 0.1 - 1.3 K/UL    Absolute Eos # 0.3 0.0 - 0.8 K/UL    Basophils Absolute 0.1 0.0 - 0.2 K/UL    Absolute Immature Granulocyte 0.1 0.0 - 0.5 K/UL   Lactic Acid    Collection Time: 12/15/22  8:05 PM   Result Value Ref Range    Lactic Acid, Plasma 0.9 0.4 - 2.0 MMOL/L   Urinalysis w rflx microscopic    Collection Time: 12/15/22  8:41 PM   Result Value Ref Range    Color, UA YELLOW/STRAW      Appearance CLEAR      Specific Gravity, UA <1.005 1.001 - 1.023    pH, Urine 6.0 5.0 - 9.0      Protein, UA Negative NEG mg/dL    Glucose, UA Negative mg/dL    Ketones, Urine Negative NEG mg/dL    Bilirubin Urine Negative NEG      Blood, Urine Negative NEG      Urobilinogen, Urine 0.2 0.2 - 1.0 EU/dL    Nitrite, Urine Negative NEG      Leukocyte Esterase, Urine Negative NEG         I have personally reviewed imaging studies showing:  XR CHEST (2 VW)    Result Date: 12/15/2022  EXAM: Chest x-ray. INDICATION: Fatigue and leukocytosis. COMPARISON: Prior chest x-ray on December 11, 2022. TECHNIQUE: Frontal and lateral view chest x-ray. FINDINGS: The lungs are clear.  The cardiac size, mediastinal contour and pulmonary vasculature are normal. No pneumothorax or pleural effusion is seen. The bones are intact. Normal chest x-ray. Echocardiogram:  No results found for this or any previous visit. Orders Placed This Encounter   Medications    DISCONTD: ceFAZolin (ANCEF) 2000 mg in sterile water 20 mL IV syringe     Order Specific Question:   Antimicrobial Indications     Answer:   Sepsis of Unknown Etiology    0.9 % sodium chloride bolus    doxycycline (VIBRAMYCIN) 100 mg in sodium chloride 0.9 % 100 mL IVPB     Order Specific Question:   Antimicrobial Indications     Answer:   Sepsis of Unknown Etiology    piperacillin-tazobactam (ZOSYN) 4,500 mg in sodium chloride 0.9 % 100 mL IVPB (mini-bag)     Order Specific Question:   Antimicrobial Indications     Answer:   Sepsis of Unknown Etiology         Signed:  Delano Story MD    Part of this note may have been written by using a voice dictation software. The note has been proof read but may still contain some grammatical/other typographical errors.

## 2022-12-16 NOTE — ED PROVIDER NOTES
Emergency Department Provider Note                   PCP:                Md Caren Rosales (Inactive)               Age: 38 y.o. Sex: female       ICD-10-CM    1. Other fatigue  R53.83       2. Leukocytosis, unspecified type  D72.829           DISPOSITION Decision To Admit 12/15/2022 10:15:58 PM        MDM  Number of Diagnoses or Management Options  Leukocytosis, unspecified type  Other fatigue  Diagnosis management comments: Vital signs reviewed, patient stable, NAD, afebrile, nontoxic in appearance     Will obtain CBC, CMP, urinalysis and chest x-ray    Physical exam is reassuring. Lungs are clear to auscultation bilaterally, abdomen is soft and nontender, oropharynx is clear. Leukocytosis on CBC with a white count of 13.4. This is markedly elevated versus patient's white count yesterday of 8.6, and even higher than patient's white count 2 days ago. No anemia noted  No concerning findings on CMP  Lactic acid within normal limits at 0.9  No leukocytosis, no blood, no nitrites on urine dip. We will send for urine culture. Will obtain blood blood cultures x2  Will administer IV doxycycline as patient has had a history of MRSA and patient is allergic to Vanco.  We will also administer some Zosyn. Due to patient's recent hospitalization and recent positive blood cultures while patient was in hospital, I feel patient needs to be admitted to the hospital for further work-up. We will contact the hospitalist via [employer] regarding admission    Contacted hospitalist, Dr. Roselia Solomon, via [facility]. Dr. Ann Ellsworth is excepted patient. Patient is currently stable. I discussed with patient her lab work, imaging, need for admission to the hospital.  Patient states she is agreeable to this.     I spoke with my attending, Dr. Katerin Santana, who is in agreement with disposition       Amount and/or Complexity of Data Reviewed  Clinical lab tests: ordered and reviewed  Tests in the radiology section of CPT®: ordered and reviewed  Review and summarize past medical records: yes  Discuss the patient with other providers: yes (Hospitalist)  Independent visualization of images, tracings, or specimens: yes (Independent visualization of imaging)    Risk of Complications, Morbidity, and/or Mortality  Presenting problems: high  Diagnostic procedures: high  Management options: high    Critical Care  Total time providing critical care: < 30 minutes         ED Course as of 12/15/22 2217   u Dec 15, 2022   2200 Lab Has been called to collect blood cultures [JG]   2207 I contacted the lab to inquire about when they would be down to draw blood cultures so we could administer IV antibiotics. I was told there is only one phlebotomist on the floor and that they are to do a shift change. They will send somebody down to soon as possible. [JG]   2209 Blood cultures were drawn by medic.   Nurse will administer IV antibiotics at this time [JG]      ED Course User Index  [JG] HARSH Tan        Orders Placed This Encounter   Procedures    Culture, Blood 1    Culture, Blood 1    Culture, Urine    XR CHEST (2 VW)    Comprehensive Metabolic Panel    CBC with Auto Differential    Lactic Acid    Urinalysis w rflx microscopic    POCT Urine Dipstick        Medications   doxycycline (VIBRAMYCIN) 100 mg in sodium chloride 0.9 % 100 mL IVPB (has no administration in time range)   piperacillin-tazobactam (ZOSYN) 4,500 mg in sodium chloride 0.9 % 100 mL IVPB (mini-bag) (has no administration in time range)   0.9 % sodium chloride bolus (1,710 mLs IntraVENous New Bag 12/15/22 2107)       New Prescriptions    No medications on file        Parvez Thomson is a 38 y.o. female who presents to the Emergency Department with chief complaint of    Chief Complaint   Patient presents with    Generalized Body Aches    Urinary Tract Infection      23-year-old female with history of arthritis and psoriasis presents emergency department today with chief complaint of fatigue, nausea, \"not feeling right. \"  Starting today. Patient was discharged yesterday from the hospital after being treated for sepsis. Patient states she was given the option to stay 1 more day but told providers that she needed to go home to go to work. Patient denies fevers, chills, chest pain, shortness of breath, vomiting, diarrhea, abdominal pain or pelvic pain. Patient states she does feel fatigued and feels as though something is wrong. States she has been taking her prescribed antibiotics. The history is provided by the patient. No  was used. Review of Systems   Constitutional:  Positive for chills and fatigue. Negative for fever. Respiratory:  Negative for shortness of breath. Cardiovascular:  Negative for chest pain and palpitations. Gastrointestinal:  Positive for nausea and vomiting. Negative for abdominal pain and diarrhea. Genitourinary:  Negative for dysuria, flank pain and pelvic pain. Skin:  Negative for color change. Neurological:  Negative for weakness and headaches. All other systems reviewed and are negative. Past Medical History:   Diagnosis Date    Arthritis     Psoriasis         Past Surgical History:   Procedure Laterality Date    GYN          ORTHOPEDIC SURGERY      cyst behind right knee        History reviewed. No pertinent family history.      Social History     Socioeconomic History    Marital status: Single     Spouse name: None    Number of children: None    Years of education: None    Highest education level: None   Tobacco Use    Smoking status: Every Day     Packs/day: 1.00     Types: Cigarettes    Smokeless tobacco: Never   Substance and Sexual Activity    Alcohol use: No    Drug use: No         Prednisone and Vancomycin     Previous Medications    AMOXICILLIN-CLAVULANATE (AUGMENTIN) 875-125 MG PER TABLET    Take 1 tablet by mouth 2 times daily for 7 days    DOXYCYCLINE HYCLATE (VIBRA-TABS) 100 MG TABLET    Take 1 tablet by mouth 2 times daily for 7 days        Vitals signs and nursing note reviewed. Patient Vitals for the past 4 hrs:   Temp Pulse Resp BP SpO2   12/15/22 1935 98.4 °F (36.9 °C) 90 20 (!) 140/92 99 %          Physical Exam  Vitals and nursing note reviewed. Constitutional:       General: She is not in acute distress. Appearance: Normal appearance. She is obese. She is not ill-appearing, toxic-appearing or diaphoretic. HENT:      Head: Normocephalic and atraumatic. Nose: Nose normal.      Mouth/Throat:      Mouth: Mucous membranes are moist.      Pharynx: Oropharynx is clear. Eyes:      General: No scleral icterus. Extraocular Movements: Extraocular movements intact. Conjunctiva/sclera: Conjunctivae normal.      Pupils: Pupils are equal, round, and reactive to light. Cardiovascular:      Rate and Rhythm: Normal rate. Pulses: Normal pulses. Heart sounds: Normal heart sounds. Pulmonary:      Effort: Pulmonary effort is normal.      Breath sounds: Normal breath sounds. Comments: Negative egophony bilaterally  Abdominal:      General: Bowel sounds are normal.      Palpations: Abdomen is soft. Tenderness: There is no abdominal tenderness. There is no guarding or rebound. Musculoskeletal:         General: Normal range of motion. Cervical back: Normal range of motion and neck supple. Right lower leg: No edema. Left lower leg: No edema. Lymphadenopathy:      Cervical: No cervical adenopathy. Skin:     General: Skin is warm and dry. Capillary Refill: Capillary refill takes less than 2 seconds. Coloration: Skin is not jaundiced or pale. Findings: No bruising, erythema, lesion or rash. Neurological:      General: No focal deficit present. Mental Status: She is alert and oriented to person, place, and time. Psychiatric:         Behavior: Behavior normal.         Thought Content:  Thought content normal. Judgment: Judgment normal.      Comments: Appears anxious        Procedures    Results for orders placed or performed during the hospital encounter of 12/15/22   XR CHEST (2 VW)    Narrative    EXAM: Chest x-ray. INDICATION: Fatigue and leukocytosis. COMPARISON: Prior chest x-ray on December 11, 2022. TECHNIQUE: Frontal and lateral view chest x-ray. FINDINGS: The lungs are clear. The cardiac size, mediastinal contour and  pulmonary vasculature are normal. No pneumothorax or pleural effusion is seen. The bones are intact. Impression    Normal chest x-ray.    Comprehensive Metabolic Panel   Result Value Ref Range    Sodium 136 133 - 143 mmol/L    Potassium 3.6 3.5 - 5.1 mmol/L    Chloride 107 101 - 110 mmol/L    CO2 24 21 - 32 mmol/L    Anion Gap 5 2 - 11 mmol/L    Glucose 81 65 - 100 mg/dL    BUN 6 6 - 23 MG/DL    Creatinine 0.70 0.6 - 1.0 MG/DL    Est, Glom Filt Rate >60 >60 ml/min/1.73m2    Calcium 9.1 8.3 - 10.4 MG/DL    Total Bilirubin 0.2 0.2 - 1.1 MG/DL    ALT 64 12 - 65 U/L    AST 18 15 - 37 U/L    Alk Phosphatase 100 50 - 136 U/L    Total Protein 9.0 (H) 6.3 - 8.2 g/dL    Albumin 3.4 (L) 3.5 - 5.0 g/dL    Globulin 5.6 (H) 2.8 - 4.5 g/dL    Albumin/Globulin Ratio 0.6 0.4 - 1.6     CBC with Auto Differential   Result Value Ref Range    WBC 13.4 (H) 4.3 - 11.1 K/uL    RBC 4.79 4.05 - 5.2 M/uL    Hemoglobin 13.9 11.7 - 15.4 g/dL    Hematocrit 42.6 35.8 - 46.3 %    MCV 88.9 82 - 102 FL    MCH 29.0 26.1 - 32.9 PG    MCHC 32.6 31.4 - 35.0 g/dL    RDW 13.7 11.9 - 14.6 %    Platelets 819 872 - 935 K/uL    MPV 10.1 9.4 - 12.3 FL    nRBC 0.00 0.0 - 0.2 K/uL    Differential Type AUTOMATED      Seg Neutrophils 75 43 - 78 %    Lymphocytes 12 (L) 13 - 44 %    Monocytes 9 4.0 - 12.0 %    Eosinophils % 2 0.5 - 7.8 %    Basophils 1 0.0 - 2.0 %    Immature Granulocytes 1 0.0 - 5.0 %    Segs Absolute 10.1 (H) 1.7 - 8.2 K/UL    Absolute Lymph # 1.7 0.5 - 4.6 K/UL    Absolute Mono # 1.2 0.1 - 1.3 K/UL    Absolute Eos # 0.3 0.0 - 0.8 K/UL    Basophils Absolute 0.1 0.0 - 0.2 K/UL    Absolute Immature Granulocyte 0.1 0.0 - 0.5 K/UL   Lactic Acid   Result Value Ref Range    Lactic Acid, Plasma 0.9 0.4 - 2.0 MMOL/L   Urinalysis w rflx microscopic   Result Value Ref Range    Color, UA YELLOW/STRAW      Appearance CLEAR      Specific Gravity, UA <1.005 1.001 - 1.023    pH, Urine 6.0 5.0 - 9.0      Protein, UA Negative NEG mg/dL    Glucose, UA Negative mg/dL    Ketones, Urine Negative NEG mg/dL    Bilirubin Urine Negative NEG      Blood, Urine Negative NEG      Urobilinogen, Urine 0.2 0.2 - 1.0 EU/dL    Nitrite, Urine Negative NEG      Leukocyte Esterase, Urine Negative NEG          XR CHEST (2 VW)   Final Result   Normal chest x-ray. Voice dictation software was used during the making of this note. This software is not perfect and grammatical and other typographical errors may be present. This note has not been completely proofread for errors.       Adrianna Schaefer, [id number]  12/15/22 0675

## 2022-12-16 NOTE — CARE COORDINATION
Chart review complete, CM attempted to meet with pt at bedside, pt found laying on stretcher alert and oriented x4, pt states she is upset d/t MD wants to DC her home today and she does not know what is wrong with her she is still having lower abd  pain. Pt has refused Covid testing at this time and dismissed CM staff states she is not going to answer anymore questions at this time. Pt was recent admission to hospital, made aware of [facility] and [facility] and had meds vouchered at that time, CM will not be able to assist with medications at this time. Demographics, insurance and PCP confirmed from previous chart. NO dc needs noted at this time from Jenn Berkowitz       12/16/22 0957   Service Assessment   Patient Orientation Alert and Oriented   Cognition Alert   History Provided By Patient   Primary Caregiver Self   Accompanied By/Relationship none   Support Systems None   PCP Verified by CM Yes   Prior Functional Level Independent in ADLs/IADLs   Current Functional Level Independent in ADLs/IADLs   Can patient return to prior living arrangement Yes   Ability to make needs known: Good   Would you like for me to discuss the discharge plan with any other family members/significant others, and if so, who? No   Financial Resources Other (Comment)  (self-pay)   CM/SW Referral Other (see comment)  (none)   Social/Functional History   Lives With Alone   Type of Home Apartment   Bathroom Toilet Standard   ADL Assistance Independent   Ambulation Assistance Independent   Transfer Assistance Independent   Active  No   Occupation Full time employment   Discharge Planning   Type of Residence [facility] Prior To Admission None   Potential Assistance Needed N/A   DME Ordered? No   Potential Assistance Purchasing Medications No   Type of Home Care Services None   Patient expects to be discharged to: Apartment   History of falls?  0   Services At/After Discharge   Transition of Care Consult (CM Consult) N/A   Services At/After Discharge None   Mode of Transport at Discharge Self

## 2022-12-18 LAB
BACTERIA SPEC CULT: NORMAL
SERVICE CMNT-IMP: NORMAL

## 2022-12-18 NOTE — PROGRESS NOTES
ED pharmacist reviewed recent results of urine culture. The patient was treated with doxycycline and piperacillin/tazobactam in the emergency department and received a prescription for amoxicillin/clavulanate 875mg BID x7d and doxycycline 100mg BID x7d upon discharge. Based on culture results, the patient is being adequately treated for the identified infection with existing antimicrobial therapy. No further intervention needed.     Allergies as of 12/15/2022 - Fully Reviewed 12/15/2022   Allergen Reaction Noted    Prednisone Itching 03/15/2020    Vancomycin Itching and Rash 12/11/2022     Socorro Mendez, PharmD  Emergency Medicine Clinical Pharmacist

## 2022-12-21 LAB
BACTERIA SPEC CULT: NORMAL
BACTERIA SPEC CULT: NORMAL
SERVICE CMNT-IMP: NORMAL
SERVICE CMNT-IMP: NORMAL

## 2023-01-02 ENCOUNTER — HOSPITAL ENCOUNTER (EMERGENCY)
Age: 43
Discharge: HOME | End: 2023-01-02
Attending: EMERGENCY MEDICINE

## 2023-01-02 ENCOUNTER — APPOINTMENT (OUTPATIENT)
Dept: ULTRASOUND IMAGING | Age: 43
End: 2023-01-02

## 2023-01-02 VITALS
RESPIRATION RATE: 18 BRPM | HEART RATE: 72 BPM | TEMPERATURE: 98 F | HEIGHT: 66 IN | SYSTOLIC BLOOD PRESSURE: 118 MMHG | WEIGHT: 170 LBS | DIASTOLIC BLOOD PRESSURE: 93 MMHG | OXYGEN SATURATION: 100 % | BODY MASS INDEX: 27.32 KG/M2

## 2023-01-02 DIAGNOSIS — R30.0 DYSURIA: Primary | ICD-10-CM

## 2023-01-02 LAB
APPEARANCE UR: CLEAR
BILIRUB UR QL: NEGATIVE
COLOR UR: ABNORMAL
GLUCOSE UR STRIP.AUTO-MCNC: NEGATIVE MG/DL
HGB UR QL STRIP: NEGATIVE
KETONES UR QL STRIP.AUTO: ABNORMAL MG/DL
LEUKOCYTE ESTERASE UR QL STRIP.AUTO: NEGATIVE
NITRITE UR QL STRIP.AUTO: NEGATIVE
PH UR STRIP: 6 [PH] (ref 5–9)
PROT UR STRIP-MCNC: NEGATIVE MG/DL
SERVICE CMNT-IMP: NORMAL
SP GR UR REFRACTOMETRY: 1.02 (ref 1–1.02)
UROBILINOGEN UR QL STRIP.AUTO: 0.2 EU/DL (ref 0.2–1)
WET PREP GENITAL: NORMAL
WET PREP GENITAL: NORMAL

## 2023-01-02 PROCEDURE — 81003 URINALYSIS AUTO W/O SCOPE: CPT

## 2023-01-02 PROCEDURE — 76856 US EXAM PELVIC COMPLETE: CPT

## 2023-01-02 PROCEDURE — 87591 N.GONORRHOEAE DNA AMP PROB: CPT

## 2023-01-02 PROCEDURE — 87210 SMEAR WET MOUNT SALINE/INK: CPT

## 2023-01-02 PROCEDURE — 99284 EMERGENCY DEPT VISIT MOD MDM: CPT

## 2023-01-02 ASSESSMENT — ENCOUNTER SYMPTOMS
ABDOMINAL PAIN: 0
VOMITING: 0
NAUSEA: 0
COLOR CHANGE: 0
CHEST TIGHTNESS: 0
SHORTNESS OF BREATH: 0
DIARRHEA: 0

## 2023-01-02 ASSESSMENT — PAIN - FUNCTIONAL ASSESSMENT: PAIN_FUNCTIONAL_ASSESSMENT: 0-10

## 2023-01-02 ASSESSMENT — PAIN DESCRIPTION - LOCATION: LOCATION: VAGINA

## 2023-01-02 NOTE — ED PROVIDER NOTES
Emergency Department Provider Note                   PCP:                None Provider               Age: 38 y.o. Sex: female       ICD-10-CM    1. Dysuria  R30.0           DISPOSITION Decision To Discharge 01/02/2023 04:57:35 PM        Medical Decision Making  Patient is a 53-year-old  female with no stated medical history of present emergency department for evaluation of dysuria, suprapubic pain, vaginal discharge. Patient has been experiencing the symptoms for many weeks. She states that her urine is frequently tested and found to be negative. She states that at one point during this timeline she was hospitalized for urosepsis. She is concerned about possibly being septic today. During this timeline, there was a questionable source of a possible inguinal cellulitis. GYN was ultimately consulted during this hospital visit which did not feel any intervention was necessary at the time. She has been since evaluated again by OB/GYN as an outpatient who did believe that there was a period of fluctuance that would be amenable to drainage. She did decline at that time due to transportation difficulties. She states that she has recently been treated with several different antibiotics for urinary tract infections. Upon arrival, patient's vitals are stable. Exam reassuring. No abdominal or suprapubic tenderness appreciated. At this point, differentials include urinary tract infection, STI, structural uterine disease. Patient's urine was ultimately normal.  I will send this for culture given patient is symptomatic still. I did obtain a transabdominal/transvaginal ultrasound which were both normal.  Wet prep was normal.  GC chlamydia pending. At this point, I do not believe any additional antibiotics are warranted at this time given patient's urine is without signs of infection. We will wait until culture results to make this decision formally.   Unclear as to the etiology of patient's symptoms. It may be related to her labial this, however, this did not appear overly tender on exam.  I will advise patient to follow-up with gynecology as before as they did appear to have plans to potentially drain this. I had a long discussion with this patient about these results and she is ultimately frustrated there is no obvious answers to what her symptoms are attributed to. She is somewhat reassured that there is no structural disease identified on ultrasound. I do believe that she is stable for discharge at this time. We will call with culture/GC chlamydia results. Patient is to schedule an appointment with gynecology. Return precautions discussed. Patient verbalizes understanding and is agreeable to this plan. Amount and/or Complexity of Data Reviewed  External Data Reviewed: labs and notes. Labs: ordered. Radiology: ordered. Complexity of Problem: 1 self limited or minor problem. (2)    I have conducted an independent ordering and review of Labs. I have conducted an independent ordering and review of Ultrasound. I have reviewed records from an external source: ED records from outside this hospital.  I have reviewed records from an external source: previous lab results from outside ED. Considerations: Shared decision making was utilized in the care of this patient. Considerations: The following labs and/or imaging studies were considered but not ordered: basic labs  Considerations: The following treatments were considered but not given: Rx such as antibiotics. Social determinant affecting care: lack of transportation  Social determinant affecting care: inability to afford medications    Patient was discharged risks and benefits of hospitalization were discussed.          Orders Placed This Encounter   Procedures    Wet prep, genital    C.trachomatis N.gonorrhoeae DNA    US PELVIS COMPLETE    Urinalysis w rflx microscopic        Medications - No data to display    There are no discharge medications for this patient. Tiffany Todd is a 38 y.o. female who presents to the Emergency Department with chief complaint of    Chief Complaint   Patient presents with    Dysuria      Tiffany Todd is a 25-year-old  female with no stated medical history presenting to the emergency department for evaluation of dysuria and suprapubic pain. Patient states she has been experiencing the symptoms for many weeks. She is also complaining of some vaginal discharge. She is unable to describe this. She states she was admitted for urosepsis at some point during this timeline. She is concerned that she may be septic today. She states that she has been seen by other emergency departments who incidentally identified a labial cyst, ultimately resulting in a referral to OB/GYN. She states that her OB/GYN did believe that this should be drained, however this patient deferred at the time due to transportation difficulties. She denies any fevers or chills. She denies any associated abdominal pain. She denies any additional aggravating or relieving factors or radiation of the symptoms. She has no other complaints today. The history is provided by the patient. Review of Systems   Constitutional:  Negative for chills, fatigue and fever. Eyes:  Negative for visual disturbance. Respiratory:  Negative for chest tightness and shortness of breath. Cardiovascular:  Negative for chest pain and palpitations. Gastrointestinal:  Negative for abdominal pain, diarrhea, nausea and vomiting. Genitourinary:  Positive for dysuria, frequency, urgency and vaginal discharge. Musculoskeletal:  Negative for arthralgias and myalgias. Skin:  Negative for color change and wound. Neurological:  Negative for dizziness, syncope, weakness, light-headedness and headaches. All other systems reviewed and are negative.     Past Medical History:   Diagnosis Date    Arthritis     Psoriasis Past Surgical History:   Procedure Laterality Date    GYN          ORTHOPEDIC SURGERY      cyst behind right knee        No family history on file. Social History     Socioeconomic History    Marital status: Single   Tobacco Use    Smoking status: Every Day     Packs/day: 1.00     Types: Cigarettes    Smokeless tobacco: Never   Substance and Sexual Activity    Alcohol use: No    Drug use: No         Prednisone and Vancomycin     There are no discharge medications for this patient. Vitals signs and nursing note reviewed. No data found. Physical Exam  Vitals and nursing note reviewed. Constitutional:       General: She is not in acute distress. Appearance: Normal appearance. She is not ill-appearing. HENT:      Head: Normocephalic and atraumatic. Right Ear: External ear normal.      Left Ear: External ear normal.      Nose: Nose normal.   Eyes:      General: No scleral icterus. Right eye: No discharge. Left eye: No discharge. Extraocular Movements: Extraocular movements intact. Conjunctiva/sclera: Conjunctivae normal.      Pupils: Pupils are equal, round, and reactive to light. Cardiovascular:      Rate and Rhythm: Normal rate and regular rhythm. Pulses: Normal pulses. Heart sounds: Normal heart sounds. Pulmonary:      Effort: Pulmonary effort is normal.      Breath sounds: Normal breath sounds. Abdominal:      General: Abdomen is flat. Palpations: Abdomen is soft. Tenderness: There is no abdominal tenderness. Musculoskeletal:         General: Normal range of motion. Cervical back: Normal range of motion and neck supple. Skin:     General: Skin is warm and dry. Neurological:      General: No focal deficit present. Mental Status: She is alert and oriented to person, place, and time.    Psychiatric:         Mood and Affect: Mood normal.         Behavior: Behavior normal.        Procedures    Results for orders placed or performed during the hospital encounter of 01/02/23   Wet prep, genital    Specimen: Vaginal   Result Value Ref Range    Special Requests NO SPECIAL REQUESTS      Wet Prep NO WBC'S SEEN      Wet Prep NO YEAST,TRICHOMONAS OR CLUE CELLS NOTED     US PELVIS COMPLETE    Narrative    STUDY: US PELVIS COMPLETE [id number]     STUDY DATE: 1/2/2023 3:14 PM     HISTORY: Persistent Pelvic Pain     COMPARISON:  CT abdomen/pelvis performed December 11, 2022. Pelvic ultrasound  performed December 29, 2019. TECHNIQUE: multiple real-time sonographic images were obtained of the pelvis  utilizing multihertz transducers. Transabdominal and endovaginal imaging was  performed. FINDINGS:  UTERUS:  SIZE: 9.1 x 4.6 x 6.1 cm   ENDOMETRIUM: 0.9 cm in maximal thickness. CERVIX: Small volume fluid noted within the endocervical canal. Nabothian cysts. FIBROIDS: None  OTHER: None  RIGHT OVARY:  SIZE:2.4 x 1.7 x 1.8 cm  CYSTS/MASSES: No suspicious lesion. FLOW: Yes  LEFT OVARY:  SIZE: 2.1 x 2.7 x 1.8 cm   CYSTS/MASSES: Simple appearing cyst measuring up to 1.7 cm. FLOW: Yes  FREE FLUID: Within normal limits. OTHER: Unremarkable sonographic appearance of the urinary bladder without  significant post void residual.      Impression    1. Trace fluid within the endocervical canal, nonspecific. Otherwise,  unremarkable sonographic appearance of the uterus. 2.  Simple appearing left ovarian cyst measuring up to 1.7 cm. Otherwise,  unremarkable sonographic appearance of the ovaries.        Urinalysis w rflx microscopic   Result Value Ref Range    Color, UA YELLOW/STRAW      Appearance CLEAR      Specific Gravity, UA 1.018 1.001 - 1.023      pH, Urine 6.0 5.0 - 9.0      Protein, UA Negative NEG mg/dL    Glucose, UA Negative mg/dL    Ketones, Urine TRACE (A) NEG mg/dL    Bilirubin Urine Negative NEG      Blood, Urine Negative NEG      Urobilinogen, Urine 0.2 0.2 - 1.0 EU/dL    Nitrite, Urine Negative NEG      Leukocyte Esterase, Urine Negative NEG          US PELVIS COMPLETE   Final Result      1. Trace fluid within the endocervical canal, nonspecific. Otherwise,   unremarkable sonographic appearance of the uterus. 2.  Simple appearing left ovarian cyst measuring up to 1.7 cm. Otherwise,   unremarkable sonographic appearance of the ovaries. Voice dictation software was used during the making of this note. This software is not perfect and grammatical and other typographical errors may be present. This note has not been completely proofread for errors.       Lito Beebe  01/03/23 [id number]

## 2023-01-02 NOTE — DISCHARGE INSTRUCTIONS
You were evaluated in the emergency department with pelvic pain and urinary burning/frequency. Your vital signs today were normal.  You did not have a fever and are oxygenating well. Your swabs today looked okay did not show any signs of obvious infection. Your urine did not show any signs of infection either, however we will culture this. One of the other swabs is still pending. We will call you about this resulted if it is positive. Your ultrasound was ultimately normal.  You do have a small cyst on your left ovary, however, this is likely not causing your symptoms. At this point, it is unclear what is causing her symptoms. I would plan follow-up with your OB/GYN as before as it appears that they had intervention planned. This will likely be her best course of action. Please return if you develop any worsening symptoms, abdominal pain, nausea or vomiting, or fever.

## 2023-01-02 NOTE — ED TRIAGE NOTES
Pt presents with complaints of continued dysuria, discharged from hospital mid December. Pt reports having taken antibiotics as prescribed with minimal relief. Pt reports discharge, unable to describe.  Psoriasis to vaginal area that is normal.

## 2023-01-02 NOTE — ED NOTES
I have reviewed discharge instructions with the patient. The patient verbalized understanding. Patient left ED via Discharge Method: ambulatory to Home with (self  Opportunity for questions and clarification provided. Patient given 0 scripts. To continue your aftercare when you leave the hospital, you may receive an automated call from our care team to check in on how you are doing. This is a free service and part of our promise to provide the best care and service to meet your aftercare needs.  If you have questions, or wish to unsubscribe from this service please call [phone]. Thank you for Choosing our [facility] Emergency Department.       Tahmina Anderson RN  01/02/23 2920

## 2023-01-02 NOTE — Clinical Note
Elaina Favre was seen and treated in our emergency department on 1/2/2023. She may return to work on 01/03/2023. If you have any questions or concerns, please don't hesitate to call.       HARSH Mart

## 2023-01-05 ENCOUNTER — HOSPITAL ENCOUNTER (EMERGENCY)
Age: 43
Discharge: HOME | End: 2023-01-05
Attending: EMERGENCY MEDICINE

## 2023-01-05 VITALS
DIASTOLIC BLOOD PRESSURE: 94 MMHG | HEART RATE: 115 BPM | SYSTOLIC BLOOD PRESSURE: 168 MMHG | TEMPERATURE: 98.2 F | RESPIRATION RATE: 18 BRPM | WEIGHT: 170 LBS | BODY MASS INDEX: 27.44 KG/M2 | OXYGEN SATURATION: 97 %

## 2023-01-05 DIAGNOSIS — R30.0 DYSURIA: Primary | ICD-10-CM

## 2023-01-05 LAB
APPEARANCE UR: CLEAR
BILIRUB UR QL: NEGATIVE
BILIRUB UR QL: NEGATIVE
COLOR UR: NORMAL
GLUCOSE UR QL STRIP.AUTO: NEGATIVE MG/DL
GLUCOSE UR STRIP.AUTO-MCNC: NEGATIVE MG/DL
HGB UR QL STRIP: NEGATIVE
KETONES UR QL STRIP.AUTO: NEGATIVE MG/DL
KETONES UR-MCNC: NEGATIVE MG/DL
LEUKOCYTE ESTERASE UR QL STRIP.AUTO: NEGATIVE
LEUKOCYTE ESTERASE UR QL STRIP: NEGATIVE
NITRITE UR QL STRIP.AUTO: NEGATIVE
NITRITE UR QL: NEGATIVE
PH UR STRIP: 7 [PH] (ref 5–9)
PH UR: 6 [PH] (ref 5–9)
PROT UR QL: NEGATIVE MG/DL
PROT UR STRIP-MCNC: NEGATIVE MG/DL
RBC # UR STRIP: NEGATIVE /UL
SERVICE CMNT-IMP: NORMAL
SP GR UR REFRACTOMETRY: 1.01 (ref 1–1.02)
SP GR UR: 1.01 (ref 1–1.02)
UROBILINOGEN UR QL STRIP.AUTO: 0.2 EU/DL (ref 0.2–1)
UROBILINOGEN UR QL: 0.2 EU/DL (ref 0.2–1)

## 2023-01-05 PROCEDURE — 99283 EMERGENCY DEPT VISIT LOW MDM: CPT

## 2023-01-05 PROCEDURE — 87086 URINE CULTURE/COLONY COUNT: CPT

## 2023-01-05 PROCEDURE — 81003 URINALYSIS AUTO W/O SCOPE: CPT

## 2023-01-05 RX ORDER — PHENAZOPYRIDINE HYDROCHLORIDE 200 MG/1
200 TABLET, FILM COATED ORAL 3 TIMES DAILY PRN
Qty: 6 TABLET | Refills: 0 | Status: SHIPPED | OUTPATIENT
Start: 2023-01-05 | End: 2023-01-08

## 2023-01-05 ASSESSMENT — ENCOUNTER SYMPTOMS
BACK PAIN: 0
VOMITING: 0
NAUSEA: 0
ABDOMINAL PAIN: 0

## 2023-01-05 ASSESSMENT — PAIN SCALES - GENERAL: PAINLEVEL_OUTOF10: 0

## 2023-01-05 ASSESSMENT — PAIN - FUNCTIONAL ASSESSMENT: PAIN_FUNCTIONAL_ASSESSMENT: 0-10

## 2023-01-05 NOTE — ED PROVIDER NOTES
Emergency Department Provider Note                   PCP:                None Provider               Age: 38 y.o. Sex: female       ICD-10-CM    1. Dysuria  R30.0           DISPOSITION Decision To Discharge 01/05/2023 10:41:46 AM        Medical Decision Making  Amount and/or Complexity of Data Reviewed  Labs: ordered. Decision-making details documented in ED Course. Risk  Prescription drug management. Complexity of Problem: 1 or more chronic illness with exacerbation. (4)    I have conducted an independent ordering and review of Labs. I have reviewed records from an external source: ED records from outside this hospital.  I have reviewed records from an external source: provider visit notes from PCP. Orders Placed This Encounter   Procedures    Culture, Urine    Urinalysis    POCT Urinalysis no Micro        Medications - No data to display    Discharge Medication List as of 1/5/2023 10:56 AM        START taking these medications    Details   phenazopyridine (PYRIDIUM) 200 MG tablet Take 1 tablet by mouth 3 times daily as needed for Pain, Disp-6 tablet, R-0Print              Jaelyn Ventura is a 38 y.o. female who presents to the Emergency Department with chief complaint of    Chief Complaint   Patient presents with    Dysuria      75-year-old female presents emerged department with persistent dysuria. Patient was seen on second for the same. At that time the plan was to culture her urine unfortunately the order was never completed. Patient returns with same symptoms and requests urinalysis with culture. The history is provided by the patient. Review of Systems   Constitutional:  Negative for chills and fever. Gastrointestinal:  Negative for abdominal pain, nausea and vomiting. Genitourinary:  Positive for dysuria. Negative for frequency and vaginal discharge. Musculoskeletal:  Negative for back pain. All other systems reviewed and are negative.     Past Medical History:   Diagnosis Date    Arthritis     Psoriasis         Past Surgical History:   Procedure Laterality Date    GYN          ORTHOPEDIC SURGERY      cyst behind right knee        No family history on file. Social History     Socioeconomic History    Marital status: Single   Tobacco Use    Smoking status: Every Day     Packs/day: 1.00     Types: Cigarettes    Smokeless tobacco: Never   Substance and Sexual Activity    Alcohol use: No    Drug use: No         Prednisone and Vancomycin     Discharge Medication List as of 2023 10:56 AM           Vitals signs and nursing note reviewed. Patient Vitals for the past 4 hrs:   Temp Pulse Resp BP SpO2   23 0848 98.2 °F (36.8 °C) (!) 115 18 (!) 168/94 97 %          Physical Exam  Vitals and nursing note reviewed. Constitutional:       General: She is not in acute distress. HENT:      Head: Normocephalic and atraumatic. Nose: Nose normal.      Mouth/Throat:      Mouth: Mucous membranes are moist.   Eyes:      Extraocular Movements: Extraocular movements intact. Conjunctiva/sclera: Conjunctivae normal.      Pupils: Pupils are equal, round, and reactive to light. Cardiovascular:      Rate and Rhythm: Normal rate and regular rhythm. Heart sounds: No murmur heard. Pulmonary:      Effort: Pulmonary effort is normal.      Breath sounds: Normal breath sounds. Abdominal:      General: Abdomen is flat. Palpations: There is no mass. Tenderness: There is no abdominal tenderness. There is no right CVA tenderness or left CVA tenderness. Musculoskeletal:         General: Normal range of motion. Cervical back: Normal range of motion and neck supple. Right lower leg: No edema. Left lower leg: No edema. Skin:     General: Skin is warm and dry. Neurological:      General: No focal deficit present. Mental Status: She is alert and oriented to person, place, and time.    Psychiatric:         Mood and Affect: Mood and affect normal.         Speech: Speech normal.        Procedures    The patient was observed in the ED. patient with chronic history of dysuria, extensive work-up only 2 days ago. Patient is scheduled to follow-up with her gynecologist next week, urine was sent for culture, and patient will be called if culture comes back positive for infection. Patient be discharged home on a short course of Pyridium for discomfort with urination. Results Reviewed:      Recent Results (from the past 24 hour(s))   POCT Urinalysis no Micro    Collection Time: 01/05/23  9:16 AM   Result Value Ref Range    Specific Gravity, Urine, POC 1.015 1.001 - 1.023      pH, Urine, POC 6.0 5.0 - 9.0      Protein, Urine, POC Negative NEG mg/dL    Glucose, UA POC Negative NEG mg/dL    Ketones, Urine, POC Negative NEG mg/dL    Bilirubin, Urine, POC Negative NEG      Blood, UA POC Negative NEG      URINE UROBILINOGEN POC 0.2 0.2 - 1.0 EU/dL    Nitrate, Urine, POC Negative NEG      Leukocyte Est, UA POC Negative NEG      Performed by: Dionicio Bosch    Urinalysis    Collection Time: 01/05/23  9:20 AM   Result Value Ref Range    Color, UA YELLOW/STRAW      Appearance CLEAR      Specific [place], UA 1.011 1.001 - 1.023      pH, Urine 7.0 5.0 - 9.0      Protein, UA Negative NEG mg/dL    Glucose, UA Negative mg/dL    Ketones, Urine Negative NEG mg/dL    Bilirubin Urine Negative NEG      Blood, Urine Negative NEG      Urobilinogen, Urine 0.2 0.2 - 1.0 EU/dL    Nitrite, Urine Negative NEG      Leukocyte Esterase, Urine Negative NEG         I discussed the results of all labs, procedures, radiographs, and treatments with the patient and available family. Treatment plan is agreed upon and the patient is ready for discharge. All voiced understanding of the discharge plan and medication instructions or changes as appropriate. Questions about treatment in the ED were answered.   All were encouraged to return should symptoms worsen or new problems develop. Voice dictation software was used during the making of this note. This software is not perfect and grammatical and other typographical errors may be present. This note has not been completely proofread for errors.      Charles Valverde MD  01/05/23 7898

## 2023-01-05 NOTE — ED NOTES
I have reviewed discharge instructions with the patient. The patient verbalized understanding. Patient left ED via Discharge Method: ambulatory to Home with self    Opportunity for questions and clarification provided. Patient given 1 scripts. To continue your aftercare when you leave the hospital, you may receive an automated call from our care team to check in on how you are doing. This is a free service and part of our promise to provide the best care and service to meet your aftercare needs.  If you have questions, or wish to unsubscribe from this service please call [phone]. Thank you for Choosing our [facility] Emergency Department.         Ambrosio Oliver RN  01/05/23 4819

## 2023-01-05 NOTE — ED NOTES
Pt seen on the 1/2 for dysuria, was supposed to have a urine culture sent out to get proper Abx, pt never had urine culture processed. Pt hs continued s/s.       Thierry Lowery RN  01/05/23 [id number]

## 2023-01-07 LAB
BACTERIA SPEC CULT: NORMAL
BACTERIA SPEC CULT: NORMAL
SERVICE CMNT-IMP: NORMAL

## 2023-02-08 ENCOUNTER — HOSPITAL ENCOUNTER (EMERGENCY)
Age: 43
Discharge: HOME | End: 2023-02-08
Attending: EMERGENCY MEDICINE

## 2023-02-08 VITALS
TEMPERATURE: 97.9 F | DIASTOLIC BLOOD PRESSURE: 78 MMHG | SYSTOLIC BLOOD PRESSURE: 119 MMHG | OXYGEN SATURATION: 98 % | RESPIRATION RATE: 18 BRPM | HEART RATE: 87 BPM

## 2023-02-08 DIAGNOSIS — R30.0 DYSURIA: ICD-10-CM

## 2023-02-08 DIAGNOSIS — R31.29 MICROSCOPIC HEMATURIA: Primary | ICD-10-CM

## 2023-02-08 LAB
BILIRUB UR QL: NEGATIVE
GLUCOSE UR QL STRIP.AUTO: NEGATIVE MG/DL
KETONES UR-MCNC: NEGATIVE MG/DL
LEUKOCYTE ESTERASE UR QL STRIP: NEGATIVE
NITRITE UR QL: NEGATIVE
PH UR: 6 (ref 5–9)
PROT UR QL: NEGATIVE MG/DL
RBC # UR STRIP: ABNORMAL
SERVICE CMNT-IMP: ABNORMAL
SP GR UR: 1.02 (ref 1–1.02)
UROBILINOGEN UR QL: 0.2 EU/DL (ref 0.2–1)

## 2023-02-08 PROCEDURE — 99284 EMERGENCY DEPT VISIT MOD MDM: CPT

## 2023-02-08 PROCEDURE — 6360000002 HC RX W HCPCS: Performed by: EMERGENCY MEDICINE

## 2023-02-08 PROCEDURE — 96372 THER/PROPH/DIAG INJ SC/IM: CPT

## 2023-02-08 PROCEDURE — 87086 URINE CULTURE/COLONY COUNT: CPT

## 2023-02-08 PROCEDURE — 81003 URINALYSIS AUTO W/O SCOPE: CPT

## 2023-02-08 RX ORDER — KETOROLAC TROMETHAMINE 30 MG/ML
30 INJECTION, SOLUTION INTRAMUSCULAR; INTRAVENOUS ONCE
Status: COMPLETED | OUTPATIENT
Start: 2023-02-08 | End: 2023-02-08

## 2023-02-08 RX ORDER — KETOROLAC TROMETHAMINE 10 MG/1
10 TABLET, FILM COATED ORAL EVERY 6 HOURS PRN
Qty: 15 TABLET | Refills: 0 | Status: SHIPPED | OUTPATIENT
Start: 2023-02-08

## 2023-02-08 RX ORDER — KETOROLAC TROMETHAMINE 10 MG/1
10 TABLET, FILM COATED ORAL EVERY 6 HOURS PRN
Qty: 15 TABLET | Refills: 0 | Status: SHIPPED | OUTPATIENT
Start: 2023-02-08 | End: 2023-02-08 | Stop reason: SDUPTHER

## 2023-02-08 RX ADMIN — KETOROLAC TROMETHAMINE 30 MG: 30 INJECTION, SOLUTION INTRAMUSCULAR at 08:43

## 2023-02-08 ASSESSMENT — PAIN SCALES - GENERAL: PAINLEVEL_OUTOF10: 4

## 2023-02-08 ASSESSMENT — PAIN DESCRIPTION - LOCATION: LOCATION: PERINEUM

## 2023-02-08 NOTE — DISCHARGE INSTRUCTIONS
As we discussed, there is no evidence of UTI today. Follow up with urology to look into your blood in urine. A urine culture is pending as we discussed.

## 2023-02-08 NOTE — ED NOTES
I have reviewed discharge instructions with the patient. The patient verbalized understanding. Patient left ED via Discharge Method: ambulatory to Home with (insert name of family/friend, self, transport self). Opportunity for questions and clarification provided. Patient given 1 scripts. To continue your aftercare when you leave the hospital, you may receive an automated call from our care team to check in on how you are doing. This is a free service and part of our promise to provide the best care and service to meet your aftercare needs. \" If you have questions, or wish to unsubscribe from this service please call [phone]. Thank you for Choosing our [facility] Emergency Department.         Valeria Ramirez RN  02/08/23 8734

## 2023-02-08 NOTE — ED PROVIDER NOTES
Emergency Department Provider Note                   PCP:                None Provider               Age: 38 y.o. Sex: female       ICD-10-CM    1. Microscopic hematuria  R31.29 [facility], [facility]      2. Dysuria  R30.0 [facility], [address] Decision To Discharge 02/08/2023 08:27:05 AM       Medical Decision Making  Urinalysis shows no evidence of acute UTI. She does have microscopic hematuria, but no other concerning findings. However the patient is adamant that she must have a UTI and states that she has multiple times when her urinalysis is normal but her urine culture comes back positive. I did review her old urine cultures and she only has a history of skin contaminant cultures with mixed skin pallavi. However, I will send a urine culture because of her insistence. Return precautions. I have given outpatient referral to urology for evaluation of chronic dysuria and microscopic hematuria. Amount and/or Complexity of Data Reviewed  Labs: ordered. Risk  Prescription drug management. Considerations: Shared decision making was utilized in the care of this patient. Considerations: The following treatments were considered but not given: Rx such as antibiotics.  Abx not appropriate in this clinical setting  Social determinant affecting care: inability to afford medications               Orders Placed This Encounter   Procedures    Culture, Urine    [facility]    POCT Urine Dipstick    POCT Urinalysis no Micro        Medications   ketorolac (TORADOL) injection 30 mg (has no administration in time range)       New Prescriptions    KETOROLAC (TORADOL) 10 MG TABLET    Take 1 tablet by mouth every 6 hours as needed for Pain        Zoraida Rivera is a 38 y.o. female who presents to the Emergency Department with chief complaint of    Chief Complaint   Patient presents with    Dysuria 64-year-old female presenting to the emergency department complaining of burning and dysuria. Increased urinary frequency. She states she has a history of frequent UTIs. She has a history she reports back in December of a UTI that resulted in sepsis. Her symptoms began yesterday and she is presenting because she is concerned that it could progress or her symptoms could worsen. She has not had any fevers, no abdominal pain no chest pain. She notes that her symptoms feel consistent with previous UTIs she has had in the past.  She also notes that she has recently started a new job, has no money currently and is going to be unable to afford her antibiotics. She states she will receive money for another 5 to 7 days. The history is provided by the patient. Review of Systems    Past Medical History:   Diagnosis Date    Arthritis     Psoriasis         Past Surgical History:   Procedure Laterality Date    GYN          ORTHOPEDIC SURGERY      cyst behind right knee        No family history on file. Social History     Socioeconomic History    Marital status: Single   Tobacco Use    Smoking status: Every Day     Packs/day: 1.00     Types: Cigarettes    Smokeless tobacco: Never   Substance and Sexual Activity    Alcohol use: No    Drug use: No        Allergies: Prednisone and Vancomycin    Previous Medications    No medications on file        Vitals signs and nursing note reviewed. Patient Vitals for the past 4 hrs:   Temp Pulse Resp BP SpO2   23 0716 97.9 °F (36.6 °C) 87 18 119/78 98 %          Physical Exam  Vitals and nursing note reviewed. Constitutional:       General: She is not in acute distress. Appearance: Normal appearance. She is normal weight. She is not ill-appearing or toxic-appearing. HENT:      Head: Normocephalic and atraumatic. Mouth/Throat:      Mouth: Mucous membranes are moist.   Eyes:      Extraocular Movements: Extraocular movements intact. Cardiovascular:      Rate and Rhythm: Normal rate and regular rhythm. Pulses: Normal pulses. Heart sounds: Normal heart sounds. Pulmonary:      Effort: Pulmonary effort is normal. No respiratory distress. Abdominal:      General: There is no distension. Palpations: Abdomen is soft. Tenderness: There is no abdominal tenderness. Musculoskeletal:      Cervical back: Normal range of motion and neck supple. Skin:     General: Skin is dry. Findings: No rash. Neurological:      General: No focal deficit present. Mental Status: She is alert and oriented to person, place, and time. Mental status is at baseline. Psychiatric:         Mood and Affect: Mood normal.         Behavior: Behavior normal.        Procedures      Results for orders placed or performed during the hospital encounter of 02/08/23   POCT Urinalysis no Micro   Result Value Ref Range    Specific Gravity, Urine, POC 1.025 (H) 1.001 - 1.023      pH, Urine, POC 6.0 5.0 - 9.0      Protein, Urine, POC Negative NEG mg/dL    Glucose, UA POC Negative NEG mg/dL    Ketones, Urine, POC Negative NEG mg/dL    Bilirubin, Urine, POC Negative NEG      Blood, UA POC Trace Intact (A) NEG      URINE UROBILINOGEN POC 0.2 0.2 - 1.0 EU/dL    Nitrate, Urine, POC Negative NEG      Leukocyte Est, UA POC Negative NEG      Performed by: Mohinder Edgar         No orders to display                         Voice dictation software was used during the making of this note. This software is not perfect and grammatical and other typographical errors may be present. This note has not been completely proofread for errors.      Apoorva Garg MD  02/08/23 3098

## 2023-02-08 NOTE — ED TRIAGE NOTES
Pt ambulatory to triage with CO urethral burning and urinary frequency x 1 day.  Reports frequent UTIs seen several times for this

## 2023-02-10 LAB
BACTERIA SPEC CULT: NORMAL
BACTERIA SPEC CULT: NORMAL
SERVICE CMNT-IMP: NORMAL

## 2023-03-07 ENCOUNTER — HOSPITAL ENCOUNTER (EMERGENCY)
Age: 43
Discharge: HOME | End: 2023-03-07
Attending: EMERGENCY MEDICINE

## 2023-03-07 VITALS
DIASTOLIC BLOOD PRESSURE: 95 MMHG | RESPIRATION RATE: 16 BRPM | HEART RATE: 88 BPM | SYSTOLIC BLOOD PRESSURE: 136 MMHG | TEMPERATURE: 98.8 F | OXYGEN SATURATION: 98 %

## 2023-03-07 DIAGNOSIS — L02.91 ABSCESS: Primary | ICD-10-CM

## 2023-03-07 PROCEDURE — 99283 EMERGENCY DEPT VISIT LOW MDM: CPT | Performed by: PHYSICIAN ASSISTANT

## 2023-03-07 RX ORDER — SULFAMETHOXAZOLE AND TRIMETHOPRIM 800; 160 MG/1; MG/1
1 TABLET ORAL 2 TIMES DAILY
Qty: 20 TABLET | Refills: 0 | Status: SHIPPED | OUTPATIENT
Start: 2023-03-07 | End: 2023-03-17

## 2023-03-07 ASSESSMENT — ENCOUNTER SYMPTOMS
VOMITING: 0
BACK PAIN: 0
SHORTNESS OF BREATH: 0
ABDOMINAL PAIN: 0
SORE THROAT: 0
NAUSEA: 0
COUGH: 0

## 2023-03-07 NOTE — Clinical Note
Dennise Jimenez was seen and treated in our emergency department on 3/7/2023. She may return to work on 03/08/2023. If you have any questions or concerns, please don't hesitate to call.       HARSH Caldera

## 2023-03-07 NOTE — Clinical Note
Kristopher Rios was seen and treated in our emergency department on 3/7/2023. She may return to work on 03/08/2023. If you have any questions or concerns, please don't hesitate to call.       HARSH Higgins

## 2023-03-07 NOTE — DISCHARGE INSTRUCTIONS
Sure to take your antibiotics to completion. If you have any worsening or worrisome symptoms develop then return here promptly for reevaluation, otherwise follow-up with both dermatology and your OB/GYN.

## 2023-03-07 NOTE — ED NOTES
I have reviewed discharge instructions with the patient. The patient verbalized understanding. Patient left ED via Discharge Method: ambulatory to Home with self    Opportunity for questions and clarification provided. Patient given 1 scripts. To continue your aftercare when you leave the hospital, you may receive an automated call from our care team to check in on how you are doing. This is a free service and part of our promise to provide the best care and service to meet your aftercare needs.  If you have questions, or wish to unsubscribe from this service please call [phone]. Thank you for Choosing our [facility] Emergency Department.         Cuca Grimes RN  03/07/23 9147

## 2023-03-07 NOTE — ED PROVIDER NOTES
Emergency Department Provider Note                   PCP:                None Provider               Age: 38 y.o. Sex: female       ICD-10-CM    1. Abscess  L02.91           DISPOSITION Decision To Discharge 2023 10:23:53 AM       Elaina Favre is a 38 y.o. female who presents to the Emergency Department with chief complaint of    Chief Complaint   Patient presents with    Abscess      Patient is a 40-year-old female who presents to this department with chief complaint of abscess in her inguinal region. She states she has had these before. She is well-established with both urology and gynecology for related symptoms. She states this abscess. Couple days ago. She feels as if it is already draining. She is concerned that she might need some antibiotics. She has minimal pain with this. She has no other medical complaints. Denies any fever chills, nausea or vomiting, vaginal discharge or bleeding. The history is provided by the patient. No  was used. Review of Systems   Constitutional:  Negative for chills and fever. HENT:  Negative for congestion and sore throat. Respiratory:  Negative for cough and shortness of breath. Cardiovascular:  Negative for chest pain and palpitations. Gastrointestinal:  Negative for abdominal pain, nausea and vomiting. Genitourinary:  Negative for dysuria and vaginal discharge. Musculoskeletal:  Negative for back pain. Skin:  Positive for wound. Psychiatric/Behavioral:  Negative for agitation. All other systems reviewed and are negative. Past Medical History:   Diagnosis Date    Arthritis     Psoriasis         Past Surgical History:   Procedure Laterality Date    GYN          ORTHOPEDIC SURGERY      cyst behind right knee        No family history on file.      Social History     Socioeconomic History    Marital status: Single   Tobacco Use    Smoking status: Every Day     Packs/day: 1.00     Types: Cigarettes    Smokeless tobacco: Never   Substance and Sexual Activity    Alcohol use: No    Drug use: No        Allergies: Prednisone and Vancomycin    Discharge Medication List as of 3/7/2023 10:55 AM        CONTINUE these medications which have NOT CHANGED    Details   ketorolac (TORADOL) 10 MG tablet Take 1 tablet by mouth every 6 hours as needed for Pain, Disp-15 tablet, R-0Print              Vitals signs and nursing note reviewed. No data found. Physical Exam  Vitals and nursing note reviewed. Constitutional:       General: She is not in acute distress. Appearance: Normal appearance. She is not ill-appearing, toxic-appearing or diaphoretic. HENT:      Head: Normocephalic and atraumatic. Nose: Nose normal.      Mouth/Throat:      Mouth: Mucous membranes are moist.   Eyes:      Pupils: Pupils are equal, round, and reactive to light. Cardiovascular:      Rate and Rhythm: Normal rate. Pulmonary:      Effort: Pulmonary effort is normal. No respiratory distress. Abdominal:      General: Abdomen is flat. Palpations: Abdomen is soft. Tenderness: There is no abdominal tenderness. Genitourinary:     Comments: RN present as a chaperone  Musculoskeletal:         General: Normal range of motion. Cervical back: Normal range of motion. No rigidity. Skin:     General: Skin is warm. Comments: Minimal amount of induration in left inguinal region. No fluctuance that would necessitate drainage. Neurological:      General: No focal deficit present. Mental Status: She is alert. Psychiatric:         Mood and Affect: Mood normal.        Procedures    Medical Decision Making  The patient's abscess was chaperone, KAI Edgar in the room. This appears just to be an indurated, developing abscess. I will get her started on antibiotics. She is well-established with follow-up. Problems Addressed:  Abscess: acute illness or injury    Risk  Prescription drug management. Complexity of Problem: 1 stable, acute illness. (3)  I reviewed records from an external source: ED records from outside this hospital.  I reviewed records from an external source: provider visit notes from PCP. Considerations: Shared decision making was utilized in the care of this patient. We discussed care recommended by provider that patient declined (tests, disposition, etc). We discussed care requested by patient that the provider declined (includes tests, admit, dc, antibiotics, etc). Patient was discharged risks and benefits of hospitalization were considered. No orders of the defined types were placed in this encounter. Medications - No data to display    Discharge Medication List as of 3/7/2023 10:55 AM        START taking these medications    Details   sulfamethoxazole-trimethoprim (BACTRIM DS) 800-160 MG per tablet Take 1 tablet by mouth 2 times daily for 10 days, Disp-20 tablet, R-0Print                     Voice dictation software was used during the making of this note. This software is not perfect and grammatical and other typographical errors may be present. This note has not been completely proofread for errors.      Ligia Ulloama  03/07/23 3779

## 2023-03-07 NOTE — ED TRIAGE NOTES
Pt ambulatory to triage. Pt reports she has an abscess on here vagina and now it has started draining and is very painful. Pt also reports urinary frequency and states she is seeing a urologist for UTI issues. Pt denies dysuria or hematuria. Pt concerned she may need an abx for her abscess. Pt denies fever/chills.

## 2023-04-20 ENCOUNTER — APPOINTMENT (OUTPATIENT)
Dept: GENERAL RADIOLOGY | Age: 43
End: 2023-04-20

## 2023-04-20 ENCOUNTER — HOSPITAL ENCOUNTER (EMERGENCY)
Age: 43
Discharge: HOME | End: 2023-04-20
Attending: EMERGENCY MEDICINE

## 2023-04-20 VITALS
HEART RATE: 93 BPM | BODY MASS INDEX: 27.31 KG/M2 | OXYGEN SATURATION: 99 % | HEIGHT: 64 IN | RESPIRATION RATE: 16 BRPM | WEIGHT: 160 LBS | SYSTOLIC BLOOD PRESSURE: 139 MMHG | DIASTOLIC BLOOD PRESSURE: 89 MMHG | TEMPERATURE: 98.4 F

## 2023-04-20 DIAGNOSIS — B96.89 ACUTE BACTERIAL SINUSITIS: Primary | ICD-10-CM

## 2023-04-20 DIAGNOSIS — J01.90 ACUTE BACTERIAL SINUSITIS: Primary | ICD-10-CM

## 2023-04-20 PROCEDURE — 71046 X-RAY EXAM CHEST 2 VIEWS: CPT

## 2023-04-20 PROCEDURE — 99283 EMERGENCY DEPT VISIT LOW MDM: CPT

## 2023-04-20 RX ORDER — BROMPHENIRAMINE MALEATE, PSEUDOEPHEDRINE HYDROCHLORIDE, AND DEXTROMETHORPHAN HYDROBROMIDE 2; 30; 10 MG/5ML; MG/5ML; MG/5ML
5 SYRUP ORAL 4 TIMES DAILY PRN
Qty: 118 ML | Refills: 0 | Status: SHIPPED | OUTPATIENT
Start: 2023-04-20 | End: 2023-04-27

## 2023-04-20 RX ORDER — AMOXICILLIN AND CLAVULANATE POTASSIUM 875; 125 MG/1; MG/1
1 TABLET, FILM COATED ORAL 2 TIMES DAILY
Qty: 20 TABLET | Refills: 0 | Status: SHIPPED | OUTPATIENT
Start: 2023-04-20 | End: 2023-04-30

## 2023-04-20 ASSESSMENT — ENCOUNTER SYMPTOMS
TROUBLE SWALLOWING: 0
COUGH: 0
RHINORRHEA: 1
PHOTOPHOBIA: 0
ABDOMINAL PAIN: 0
SHORTNESS OF BREATH: 0
VOMITING: 0
CHOKING: 1
SINUS PRESSURE: 1
SINUS PAIN: 1
FACIAL SWELLING: 0

## 2023-04-20 NOTE — ED PROVIDER NOTES
primary historian. The history is provided by the patient. No  was used. Review of Systems   Constitutional:  Negative for fever. HENT:  Positive for congestion, postnasal drip, rhinorrhea, sinus pressure and sinus pain. Negative for facial swelling and trouble swallowing. Eyes:  Negative for photophobia and visual disturbance. Respiratory:  Positive for choking. Negative for cough and shortness of breath. Cardiovascular:  Negative for chest pain. Gastrointestinal:  Negative for abdominal pain and vomiting. Genitourinary:  Negative for dysuria. Musculoskeletal:  Negative for joint swelling, myalgias and neck stiffness. Skin:  Negative for rash and wound. Neurological:  Negative for dizziness, syncope, weakness and light-headedness. Psychiatric/Behavioral:  Negative for self-injury and suicidal ideas. All other systems reviewed and are negative. Physical Exam     Vitals signs and nursing note reviewed:  Patient Vitals for the past 4 hrs:   Temp Pulse Resp BP SpO2   04/20/23 1032 98.4 °F (36.9 °C) 93 16 139/89 99 %   04/20/23 0932 -- (!) 108 16 -- --   04/20/23 0923 98.4 °F (36.9 °C) (!) 119 21 (!) 135/98 96 %          Physical Exam  Vitals and nursing note reviewed. Constitutional:       General: She is not in acute distress. Appearance: Normal appearance. She is not ill-appearing, toxic-appearing or diaphoretic. HENT:      Head: Normocephalic and atraumatic. Right Ear: Tympanic membrane, ear canal and external ear normal. There is no impacted cerumen. Left Ear: Tympanic membrane, ear canal and external ear normal. There is no impacted cerumen. Nose: Congestion and rhinorrhea present. Comments: Bilateral sinus tenderness in the maxillary and frontal regions     Mouth/Throat:      Mouth: Mucous membranes are moist.      Pharynx: Posterior oropharyngeal erythema present. No oropharyngeal exudate.       Comments: Mild posterior

## 2023-04-20 NOTE — ED NOTES
I have reviewed discharge instructions with the patient. The patient verbalized understanding. Patient left ED via Discharge Method: ambulatory to Home with self    Opportunity for questions and clarification provided. Patient given 2 scripts. To continue your aftercare when you leave the hospital, you may receive an automated call from our care team to check in on how you are doing. This is a free service and part of our promise to provide the best care and service to meet your aftercare needs.  If you have questions, or wish to unsubscribe from this service please call [phone]. Thank you for Choosing our [facility] Emergency Department.         Alejandra Aragon RN  04/20/23 6249

## 2023-04-20 NOTE — ED TRIAGE NOTES
Patient arrives to ED pov from home. Patient reports cough and runny nose x 1 week. Denies fever or chills. Patient reports productive cough with white mucus.

## 2023-04-20 NOTE — DISCHARGE INSTRUCTIONS
I suspect you have a sinus infection. Your chest x-ray did not show signs of pneumonia. Please take the antibiotics for the full course to treat bacterial infection. Utilize prescribed cough medication as needed for cough. I recommend alternating tylenol and advil every 4 hours for pain, fever, bodyaches. Use cough drops, warm salt water gargles for sore throat. Use flonase nasal spray for congestion. Drink plenty of fluids and do not allow yourself to get dehydrated. Continue to monitor your symptoms and return for any new or worsening symptoms. Otherwise please follow up with your primary care provider. As we discussed, I did not find a life threatening cause of your symptoms today. However, THAT DOES NOT MEAN IT COULD NOT DEVELOP. If you develop ANY new or worsening symptoms, it is critical that you return for re-evaluation. This includes any symptoms that are concerning to you, especially symptoms such as fever, difficulty breathing, chest pain, difficulty swallowing. If you do not return for re-evaluation, you risk serious complications, including death.

## 2023-07-01 ENCOUNTER — HOSPITAL ENCOUNTER (EMERGENCY)
Age: 43
Discharge: HOME | End: 2023-07-01
Attending: EMERGENCY MEDICINE

## 2023-07-01 VITALS
HEART RATE: 126 BPM | DIASTOLIC BLOOD PRESSURE: 96 MMHG | BODY MASS INDEX: 29.88 KG/M2 | HEIGHT: 64 IN | WEIGHT: 175 LBS | SYSTOLIC BLOOD PRESSURE: 159 MMHG | OXYGEN SATURATION: 98 % | TEMPERATURE: 98.4 F | RESPIRATION RATE: 18 BRPM

## 2023-07-01 DIAGNOSIS — J01.90 ACUTE SINUSITIS, RECURRENCE NOT SPECIFIED, UNSPECIFIED LOCATION: Primary | ICD-10-CM

## 2023-07-01 PROCEDURE — 99283 EMERGENCY DEPT VISIT LOW MDM: CPT

## 2023-07-01 RX ORDER — FLUTICASONE PROPIONATE 50 MCG
1 SPRAY, SUSPENSION (ML) NASAL DAILY
Qty: 32 G | Refills: 1 | Status: SHIPPED | OUTPATIENT
Start: 2023-07-01

## 2023-07-01 RX ORDER — FLUTICASONE PROPIONATE 50 MCG
1 SPRAY, SUSPENSION (ML) NASAL ONCE
Status: DISCONTINUED | OUTPATIENT
Start: 2023-07-01 | End: 2023-07-01

## 2023-07-01 ASSESSMENT — PAIN - FUNCTIONAL ASSESSMENT: PAIN_FUNCTIONAL_ASSESSMENT: NONE - DENIES PAIN

## 2023-07-13 ENCOUNTER — HOSPITAL ENCOUNTER (EMERGENCY)
Age: 43
Discharge: HOME | End: 2023-07-13
Attending: STUDENT IN AN ORGANIZED HEALTH CARE EDUCATION/TRAINING PROGRAM

## 2023-07-13 VITALS
WEIGHT: 175 LBS | SYSTOLIC BLOOD PRESSURE: 145 MMHG | DIASTOLIC BLOOD PRESSURE: 101 MMHG | RESPIRATION RATE: 18 BRPM | HEIGHT: 66 IN | BODY MASS INDEX: 28.12 KG/M2 | HEART RATE: 105 BPM | TEMPERATURE: 97.9 F | OXYGEN SATURATION: 98 %

## 2023-07-13 DIAGNOSIS — H65.02 NON-RECURRENT ACUTE SEROUS OTITIS MEDIA OF LEFT EAR: Primary | ICD-10-CM

## 2023-07-13 PROCEDURE — 99283 EMERGENCY DEPT VISIT LOW MDM: CPT

## 2023-07-13 RX ORDER — FLUTICASONE PROPIONATE 50 MCG
1 SPRAY, SUSPENSION (ML) NASAL DAILY
Qty: 16 G | Refills: 0 | Status: SHIPPED | OUTPATIENT
Start: 2023-07-13

## 2023-07-13 RX ORDER — AMOXICILLIN 500 MG/1
500 CAPSULE ORAL 3 TIMES DAILY
Qty: 21 CAPSULE | Refills: 0 | Status: SHIPPED | OUTPATIENT
Start: 2023-07-13 | End: 2023-07-20

## 2023-07-13 ASSESSMENT — PAIN DESCRIPTION - LOCATION: LOCATION: EAR

## 2023-07-13 ASSESSMENT — PAIN DESCRIPTION - ORIENTATION: ORIENTATION: LEFT

## 2023-07-13 ASSESSMENT — PAIN SCALES - GENERAL: PAINLEVEL_OUTOF10: 7

## 2023-07-13 ASSESSMENT — PATIENT HEALTH QUESTIONNAIRE - PHQ9: SUM OF ALL RESPONSES TO PHQ QUESTIONS 1-9: 0

## 2023-07-13 ASSESSMENT — PAIN - FUNCTIONAL ASSESSMENT: PAIN_FUNCTIONAL_ASSESSMENT: 0-10

## 2023-07-13 NOTE — ED PROVIDER NOTES
Emergency Department Provider Note       PCP: None None   Age: 37 y.o. Sex: female     DISPOSITION Decision To Discharge 07/13/2023 08:31:22 AM       ICD-10-CM    1. Non-recurrent acute serous otitis media of left ear  H65.02           Medical Decision Making     Complexity of Problems Addressed:  Complexity of Problem: 1 acute, uncomplicated illness or injury. Data Reviewed and Analyzed:  Category 1:   I independently ordered and reviewed each unique test.         Category 2:       Category 3: Discussion of management or test interpretation. Patient is a 52-year-old female presenting with left ear pain x2 days with a feeling of pressure and water behind her ear in the setting of a recent viral upper respiratory infection for the last week and a half. She is afebrile, vital signs stable on exam.  On exam she has middle ear effusion on the left side with an injected tympanic membrane. No swelling to the canal, no hearing changes. We will opt for treatment with amoxicillin and also given prescription for Flonase. Discussed follow-up as well as reasons to return to the ED. Patient verbalizes understanding and is agreeable to plan. Risk of Complications and/or Morbidity of Patient Management:  OTC drug management performed and Prescription drug management performed    History     Martha Turner is a 37 y.o. female who presents to the Emergency Department with chief complaint of    Chief Complaint   Patient presents with    Otalgia      Pt is a 36 yo F who presents with 2 days of left ear pain. She has been sick with URI/sinus infection for the last 10 days, went to the ED and was told it was all likely viral. However, she is concerned that her ear could now be infected. Not currently taking any medication. No fevers/chills. Rates her pain as a 7/10, throbbing in nature. Feels like there is fluid in her ear. The history is provided by the patient.       Physical Exam     Vitals signs and nursing

## 2023-07-13 NOTE — ED TRIAGE NOTES
Patient reports to the ED with complaint of left ear pain. State she was recently seen and was told she had a sinus infection. Was told she had water behind her ear. And has been having pain for 2 days.

## 2023-07-15 ENCOUNTER — HOSPITAL ENCOUNTER (EMERGENCY)
Age: 43
Discharge: HOME | End: 2023-07-15
Attending: EMERGENCY MEDICINE

## 2023-07-15 VITALS
OXYGEN SATURATION: 98 % | BODY MASS INDEX: 28.12 KG/M2 | SYSTOLIC BLOOD PRESSURE: 141 MMHG | HEART RATE: 72 BPM | TEMPERATURE: 98.1 F | DIASTOLIC BLOOD PRESSURE: 80 MMHG | RESPIRATION RATE: 18 BRPM | WEIGHT: 175 LBS | HEIGHT: 66 IN

## 2023-07-15 DIAGNOSIS — R07.89 CHEST WALL PAIN: ICD-10-CM

## 2023-07-15 DIAGNOSIS — J06.9 ACUTE UPPER RESPIRATORY INFECTION: Primary | ICD-10-CM

## 2023-07-15 DIAGNOSIS — J40 BRONCHITIS: ICD-10-CM

## 2023-07-15 PROCEDURE — 99282 EMERGENCY DEPT VISIT SF MDM: CPT

## 2023-07-15 ASSESSMENT — PAIN - FUNCTIONAL ASSESSMENT: PAIN_FUNCTIONAL_ASSESSMENT: NONE - DENIES PAIN

## 2023-07-15 NOTE — ED TRIAGE NOTES
Pt ambulatory to triage from home. Pt c/o chest congestion, cough, sinus congestion. Pt has been seen here two different times in the past week and a half. Pt was dx with sinus congestion and fluid in ear. Pt was prescribed antibiotics. Pt denies shortness of breath, NV. Diarrhea since starting antibiotics. Pt states productive cough with white sputum. States pain in chest when coughing.

## 2023-07-15 NOTE — DISCHARGE INSTRUCTIONS
Ibuprofen 600mg with food 3x/day x 5-7 days  Continue flonase  Continue amoxicillin  Use mucinex regular - Guaifenesin - Extended release 600mg twice a day x 5-7 days with plenty of fluids  Can take benadryl 25mg at night to suppress cough and dry you out more, can make you sleepy however.    Add tylenol 500-650mg (1 extra strength vs. 2 regular) every 6 hours as needed for pain

## 2024-01-12 ENCOUNTER — HOSPITAL ENCOUNTER (EMERGENCY)
Age: 44
Discharge: HOME | End: 2024-01-12
Attending: EMERGENCY MEDICINE

## 2024-01-12 VITALS
RESPIRATION RATE: 15 BRPM | TEMPERATURE: 97.5 F | DIASTOLIC BLOOD PRESSURE: 94 MMHG | SYSTOLIC BLOOD PRESSURE: 138 MMHG | HEART RATE: 90 BPM | OXYGEN SATURATION: 99 %

## 2024-01-12 DIAGNOSIS — R30.0 DYSURIA: Primary | ICD-10-CM

## 2024-01-12 LAB
APPEARANCE UR: CLEAR
BACTERIA URNS QL MICRO: NEGATIVE /HPF
BILIRUB UR QL: NEGATIVE
BILIRUB UR QL: NEGATIVE
CASTS URNS QL MICRO: ABNORMAL /LPF
COLOR UR: ABNORMAL
EPI CELLS #/AREA URNS HPF: ABNORMAL /HPF
GLUCOSE UR QL STRIP.AUTO: NEGATIVE MG/DL
GLUCOSE UR STRIP.AUTO-MCNC: NEGATIVE MG/DL
HGB UR QL STRIP: NEGATIVE
KETONES UR QL STRIP.AUTO: NEGATIVE MG/DL
KETONES UR-MCNC: NEGATIVE MG/DL
LEUKOCYTE ESTERASE UR QL STRIP.AUTO: ABNORMAL
LEUKOCYTE ESTERASE UR QL STRIP: ABNORMAL
NITRITE UR QL STRIP.AUTO: NEGATIVE
NITRITE UR QL: NEGATIVE
PH UR STRIP: 6 (ref 5–9)
PH UR: 6.5 (ref 5–9)
PROT UR QL: NEGATIVE MG/DL
PROT UR STRIP-MCNC: NEGATIVE MG/DL
RBC # UR STRIP: NEGATIVE
RBC #/AREA URNS HPF: ABNORMAL /HPF
SERVICE CMNT-IMP: ABNORMAL
SERVICE CMNT-IMP: NORMAL
SP GR UR REFRACTOMETRY: 1.01 (ref 1–1.02)
SP GR UR: 1.02 (ref 1–1.02)
UROBILINOGEN UR QL STRIP.AUTO: 0.2 EU/DL (ref 0.2–1)
UROBILINOGEN UR QL: 0.2 EU/DL (ref 0.2–1)
WBC URNS QL MICRO: ABNORMAL /HPF
WET PREP GENITAL: NORMAL
WET PREP GENITAL: NORMAL

## 2024-01-12 PROCEDURE — 87591 N.GONORRHOEAE DNA AMP PROB: CPT

## 2024-01-12 PROCEDURE — 87086 URINE CULTURE/COLONY COUNT: CPT

## 2024-01-12 PROCEDURE — 87210 SMEAR WET MOUNT SALINE/INK: CPT

## 2024-01-12 PROCEDURE — 81001 URINALYSIS AUTO W/SCOPE: CPT

## 2024-01-12 PROCEDURE — 99283 EMERGENCY DEPT VISIT LOW MDM: CPT

## 2024-01-12 PROCEDURE — 87491 CHLMYD TRACH DNA AMP PROBE: CPT

## 2024-01-12 PROCEDURE — 81003 URINALYSIS AUTO W/O SCOPE: CPT

## 2024-01-12 RX ORDER — AMOXICILLIN AND CLAVULANATE POTASSIUM 875; 125 MG/1; MG/1
1 TABLET, FILM COATED ORAL 2 TIMES DAILY
Qty: 20 TABLET | Refills: 0 | Status: SHIPPED | OUTPATIENT
Start: 2024-01-12 | End: 2024-01-22

## 2024-01-12 RX ORDER — KETOROLAC TROMETHAMINE 15 MG/ML
15 INJECTION, SOLUTION INTRAMUSCULAR; INTRAVENOUS ONCE
Status: DISCONTINUED | OUTPATIENT
Start: 2024-01-12 | End: 2024-01-12

## 2024-01-12 ASSESSMENT — ENCOUNTER SYMPTOMS: GASTROINTESTINAL NEGATIVE: 1

## 2024-01-12 NOTE — ED NOTES
I have reviewed discharge instructions with the patient.  The patient verbalized understanding.    Patient left ED via Discharge Method: ambulatory to Home with self.    Opportunity for questions and clarification provided.       Patient given 1 scripts.         To continue your aftercare when you leave the hospital, you may receive an automated call from our care team to check in on how you are doing.  This is a free service and part of our promise to provide the best care and service to meet your aftercare needs.” If you have questions, or wish to unsubscribe from this service please call [phone].  Thank you for Choosing our [facility] Emergency Department.        Rosemary Carrero LPN  01/12/24 0941

## 2024-01-12 NOTE — ED PROVIDER NOTES
Urine 6.0 5.0 - 9.0      Protein, UA Negative NEG mg/dL    Glucose, UA Negative mg/dL    Ketones, Urine Negative NEG mg/dL    Bilirubin Urine Negative NEG      Blood, Urine Negative NEG      Urobilinogen, Urine 0.2 0.2 - 1.0 EU/dL    Nitrite, Urine Negative NEG      Leukocyte Esterase, Urine TRACE (A) NEG      WBC, UA 0-4 U4 /hpf    RBC, UA 0-5 U5 /hpf    Epithelial Cells UA 0-5 U5 /hpf    BACTERIA, URINE Negative NEG /hpf    Casts 0-2 U2 /lpf   POCT Urinalysis no Micro   Result Value Ref Range    Specific Gravity, Urine, POC 1.020 1.001 - 1.023      pH, Urine, POC 6.5 5.0 - 9.0      Protein, Urine, POC Negative NEG mg/dL    Glucose, UA POC Negative NEG mg/dL    Ketones, Urine, POC Negative NEG mg/dL    Bilirubin, Urine, POC Negative NEG      Blood, UA POC Negative NEG      URINE UROBILINOGEN POC 0.2 0.2 - 1.0 EU/dL    Nitrite, Urine, POC Negative NEG      Leukocyte Est, UA POC TRACE (A) NEG      Performed by: Feng Preciado         No orders to display                     Voice dictation software was used during the making of this note.  This software is not perfect and grammatical and other typographical errors may be present.  This note has not been completely proofread for errors.

## 2024-01-12 NOTE — ED TRIAGE NOTES
Pt arrives to the ER with c/o pelvic pain and urinary frequency x 1 day. Pt states having a hx of UTI's

## 2024-01-12 NOTE — DISCHARGE INSTRUCTIONS
Follow up with continued or worsening problems  Urine culture was done with your legacy of often difficult to diagnose urinary issues  Antibiotic: Augmentin  Take probiotics or similar.  Make certain you are drinking extra liquids.

## 2024-01-14 LAB
BACTERIA SPEC CULT: ABNORMAL
SERVICE CMNT-IMP: ABNORMAL

## 2024-01-17 ASSESSMENT — ENCOUNTER SYMPTOMS: COUGH: 1

## 2024-01-18 ENCOUNTER — HOSPITAL ENCOUNTER (EMERGENCY)
Age: 44
Discharge: HOME | End: 2024-01-18
Attending: GENERAL PRACTICE

## 2024-01-18 VITALS
HEART RATE: 74 BPM | BODY MASS INDEX: 28.12 KG/M2 | OXYGEN SATURATION: 97 % | SYSTOLIC BLOOD PRESSURE: 117 MMHG | RESPIRATION RATE: 18 BRPM | DIASTOLIC BLOOD PRESSURE: 86 MMHG | HEIGHT: 66 IN | TEMPERATURE: 98.1 F | WEIGHT: 175 LBS

## 2024-01-18 DIAGNOSIS — N39.0 URINARY TRACT INFECTION WITHOUT HEMATURIA, SITE UNSPECIFIED: Primary | ICD-10-CM

## 2024-01-18 LAB
BACTERIA URNS QL MICRO: NEGATIVE /HPF
BILIRUB UR QL: NEGATIVE
CASTS URNS QL MICRO: NORMAL /LPF
EPI CELLS #/AREA URNS HPF: NORMAL /HPF
GLUCOSE UR QL STRIP.AUTO: NEGATIVE MG/DL
KETONES UR-MCNC: NEGATIVE MG/DL
LEUKOCYTE ESTERASE UR QL STRIP: ABNORMAL
NITRITE UR QL: NEGATIVE
PH UR: 6 (ref 5–9)
PROT UR QL: NEGATIVE MG/DL
RBC # UR STRIP: ABNORMAL
RBC #/AREA URNS HPF: NORMAL /HPF
SERVICE CMNT-IMP: ABNORMAL
SP GR UR: 1.01 (ref 1–1.02)
UROBILINOGEN UR QL: 0.2 EU/DL (ref 0.2–1)
WBC URNS QL MICRO: NORMAL /HPF

## 2024-01-18 PROCEDURE — 81015 MICROSCOPIC EXAM OF URINE: CPT

## 2024-01-18 PROCEDURE — 87086 URINE CULTURE/COLONY COUNT: CPT

## 2024-01-18 PROCEDURE — 81003 URINALYSIS AUTO W/O SCOPE: CPT

## 2024-01-18 PROCEDURE — 99283 EMERGENCY DEPT VISIT LOW MDM: CPT

## 2024-01-18 RX ORDER — CEPHALEXIN 500 MG/1
500 CAPSULE ORAL 3 TIMES DAILY
Qty: 21 CAPSULE | Refills: 0 | Status: SHIPPED | OUTPATIENT
Start: 2024-01-18 | End: 2024-01-25

## 2024-01-18 ASSESSMENT — PAIN - FUNCTIONAL ASSESSMENT
PAIN_FUNCTIONAL_ASSESSMENT: NONE - DENIES PAIN
PAIN_FUNCTIONAL_ASSESSMENT: 0-10

## 2024-01-18 ASSESSMENT — PAIN DESCRIPTION - DESCRIPTORS: DESCRIPTORS: BURNING

## 2024-01-18 ASSESSMENT — PAIN SCALES - GENERAL: PAINLEVEL_OUTOF10: 8

## 2024-01-18 NOTE — ED PROVIDER NOTES
Emergency Department Provider Note       PCP: None, None   Age: 43 y.o.   Sex: female     DISPOSITION Decision To Discharge 01/18/2024 07:46:59 AM       ICD-10-CM    1. Urinary tract infection without hematuria, site unspecified  N39.0           Medical Decision Making     Complexity of Problems Addressed:  1 acute uncomplicated illness    Data Reviewed and Analyzed:  I independently ordered and reviewed each unique test.             Discussion of management or test interpretation.  Patient presents with persistent dysuria.  She does not have any signs or symptoms consistent with sepsis or upper tract infection.  She is requesting to switch antibiotics.  Her urine today does not show signs of infection.  This could be a partially treated UTI.  I did review the urine culture which was positive for group B strep.  We will switch to Keflex.  Return precautions are given.      Risk of Complications and/or Morbidity of Patient Management:  Prescription drug management performed.  Shared medical decision making was utilized in creating the patients health plan today.         History       Patient presents with persistent dysuria.  She was seen here 5 days ago and started on amoxicillin.  She has had persistent dysuria.  She denies any flank pain or vomiting or fevers.  She feels as though she is not getting better.  She denies abdominal pain.  But she does admit to some pain in her \"urethra\".  She denies any vaginal discharge or itching.         Review of Systems   All other systems reviewed and are negative.      Physical Exam     Vitals signs and nursing note reviewed:  Vitals:    01/18/24 0624   BP: 109/81   Pulse: 99   Resp: 16   Temp: 98.3 °F (36.8 °C)   TempSrc: Oral   SpO2: 99%   Weight: 79.4 kg (175 lb)   Height: 1.676 m (5' 6\")      Physical Exam  Constitutional:       General: She is not in acute distress.  HENT:      Head: Normocephalic and atraumatic.      Nose: Nose normal.      Mouth/Throat:      Mouth:

## 2024-01-18 NOTE — ED NOTES
I have reviewed discharge instructions with the patient.  The patient verbalized understanding.    Patient left ED via Discharge Method: ambulatory to Home with (self).    Opportunity for questions and clarification provided.       Patient given 1 scripts.         To continue your aftercare when you leave the hospital, you may receive an automated call from our care team to check in on how you are doing.  This is a free service and part of our promise to provide the best care and service to meet your aftercare needs.\" If you have questions, or wish to unsubscribe from this service please call [phone].  Thank you for Choosing our [facility] Emergency Department.       Narayan Rodriguez, RN  01/18/24 3863

## 2024-01-20 LAB
BACTERIA SPEC CULT: NORMAL
SERVICE CMNT-IMP: NORMAL

## 2024-12-28 ENCOUNTER — HOSPITAL ENCOUNTER (EMERGENCY)
Age: 44
Discharge: HOME | End: 2024-12-28
Attending: EMERGENCY MEDICINE

## 2024-12-28 VITALS
TEMPERATURE: 98.1 F | WEIGHT: 145 LBS | BODY MASS INDEX: 24.16 KG/M2 | HEART RATE: 89 BPM | DIASTOLIC BLOOD PRESSURE: 92 MMHG | OXYGEN SATURATION: 100 % | RESPIRATION RATE: 16 BRPM | HEIGHT: 65 IN | SYSTOLIC BLOOD PRESSURE: 130 MMHG

## 2024-12-28 DIAGNOSIS — L60.0 INGROWN FINGERNAIL: Primary | ICD-10-CM

## 2024-12-28 PROCEDURE — 99282 EMERGENCY DEPT VISIT SF MDM: CPT

## 2024-12-28 NOTE — DISCHARGE INSTRUCTIONS
Discharge instructions say ingrown toenail but just pretended says ingrown fingernail.    Clean twice daily with antibacterial hand soap and water and apply over-the-counter bacitracin ointment.  Several times a day pull the skin away from the edge of the nail to allow continued drainage as it may continue to drain purulent material.  Warm soaks for 10 minutes a few times a day can help promote this and help with pain.  Tylenol and Motrin for pain.  Return if any new, worsening or concerning symptoms

## 2024-12-28 NOTE — ED TRIAGE NOTES
Patient arrived related to right hand - middle finger injury- potentially infected- site is red, painful, swollen per patient. Patient reports of symptoms since last night- it could be due to cutting her nails.

## 2024-12-28 NOTE — ED PROVIDER NOTES
Complexity:     Past Medical History:   Diagnosis Date    Arthritis     Psoriasis         Past Surgical History:   Procedure Laterality Date    GYN          ORTHOPEDIC SURGERY      cyst behind right knee        Social History     Socioeconomic History    Marital status: Single   Tobacco Use    Smoking status: Every Day     Current packs/day: 1.00     Types: Cigarettes    Smokeless tobacco: Never   Substance and Sexual Activity    Alcohol use: No    Drug use: No     Social Determinants of Health     Financial Resource Strain: Low Risk  (2024)    Received from [employer]    Financial Resource Strain     Difficulty Paying Living Expenses: Not hard at all     Difficulty Paying Medical Expenses: No   Food Insecurity: No Food Insecurity (2024)    Received from [employer]    Food Insecurity     Worried about Running Out of Food in the Last Year: Never true     Ran Out of Food in the Last Year: Never true   Transportation Needs: No Transportation Needs (2024)    Received from [employer]    Transportation Needs     Lack of Transportation: No   Physical Activity: Inactive (2024)    Received from [employer]    Physical Activity     Days of Exercise per Week: 0     Minutes of Exercise per Session: 0     Total Minutes of Exercise per Week: 0   Stress: No Stress Concern Present (2024)    Received from [employer]    Stress     Feeling of Stress : Not at all   Social Connections: Moderately Integrated (2024)    Received from [employer]    Social Connections     Frequency of Communication with Friends and Family: Twice a week     Frequency of Social Gatherings with Friends and Family: Twice a week   Intimate Partner Violence: Not At Risk (2024)    Received from [employer]    Intimate Partner Violence     Fear of Current or Ex-Partner: No     Emotionally Abused: No     Physically Abused: No     Sexually Abused: No   Housing Stability: Not At Risk (2024)    Received  from [facility]    Housing Stability     Was there a time when you did not have a steady place to sleep: No     Worried that the place you are staying is making you sick: No        Previous Medications    FLUTICASONE (FLONASE) 50 MCG/ACT NASAL SPRAY    1 spray by Each Nostril route daily    KETOROLAC (TORADOL) 10 MG TABLET    Take 1 tablet by mouth every 6 hours as needed for Pain        Results from this emergency department visit:      No results found for any visits on 12/28/24.      No orders to display                No results for input(s): \"COVID19\" in the last 72 hours.     Voice dictation software was used during the making of this note.  This software is not perfect and grammatical and other typographical errors may be present.  This note has not been completely proofread for errors.     Jett Richardson MD  12/28/24 0862

## 2025-01-21 ENCOUNTER — HOSPITAL ENCOUNTER (EMERGENCY)
Age: 45
Discharge: HOME | End: 2025-01-21
Attending: EMERGENCY MEDICINE

## 2025-01-21 VITALS
WEIGHT: 145 LBS | SYSTOLIC BLOOD PRESSURE: 121 MMHG | OXYGEN SATURATION: 99 % | DIASTOLIC BLOOD PRESSURE: 84 MMHG | BODY MASS INDEX: 24.16 KG/M2 | RESPIRATION RATE: 17 BRPM | TEMPERATURE: 97.7 F | HEART RATE: 66 BPM | HEIGHT: 65 IN

## 2025-01-21 DIAGNOSIS — N30.10 CHRONIC INTERSTITIAL CYSTITIS: ICD-10-CM

## 2025-01-21 DIAGNOSIS — R30.0 DYSURIA: Primary | ICD-10-CM

## 2025-01-21 LAB
ALBUMIN SERPL-MCNC: 3.5 G/DL (ref 3.5–5)
ALBUMIN/GLOB SERPL: 0.6 (ref 1–1.9)
ALP SERPL-CCNC: 113 U/L (ref 35–104)
ALT SERPL-CCNC: 13 U/L (ref 8–45)
ANION GAP SERPL CALC-SCNC: 10 MMOL/L (ref 7–16)
APPEARANCE UR: CLEAR
AST SERPL-CCNC: 21 U/L (ref 15–37)
BASOPHILS # BLD: 0.1 K/UL (ref 0–0.2)
BASOPHILS NFR BLD: 1.1 % (ref 0–2)
BILIRUB SERPL-MCNC: 0.2 MG/DL (ref 0–1.2)
BILIRUB UR QL: NEGATIVE
BUN SERPL-MCNC: 8 MG/DL (ref 6–23)
CALCIUM SERPL-MCNC: 9.2 MG/DL (ref 8.8–10.2)
CHLORIDE SERPL-SCNC: 100 MMOL/L (ref 98–107)
CO2 SERPL-SCNC: 24 MMOL/L (ref 20–29)
COLOR UR: NORMAL
CREAT SERPL-MCNC: 0.72 MG/DL (ref 0.6–1.1)
DIFFERENTIAL METHOD BLD: NORMAL
EOSINOPHIL # BLD: 0.42 K/UL (ref 0–0.8)
EOSINOPHIL NFR BLD: 4.5 % (ref 0.5–7.8)
ERYTHROCYTE [DISTWIDTH] IN BLOOD BY AUTOMATED COUNT: 13.4 % (ref 11.9–14.6)
GLOBULIN SER CALC-MCNC: 5.5 G/DL (ref 2.3–3.5)
GLUCOSE SERPL-MCNC: 82 MG/DL (ref 70–99)
GLUCOSE UR STRIP.AUTO-MCNC: NEGATIVE MG/DL
HCG UR QL: NEGATIVE
HCT VFR BLD AUTO: 41.6 % (ref 35.8–46.3)
HGB BLD-MCNC: 13.6 G/DL (ref 11.7–15.4)
HGB UR QL STRIP: NEGATIVE
IMM GRANULOCYTES # BLD AUTO: 0.07 K/UL (ref 0–0.5)
IMM GRANULOCYTES NFR BLD AUTO: 0.8 % (ref 0–5)
KETONES UR QL STRIP.AUTO: NEGATIVE MG/DL
LEUKOCYTE ESTERASE UR QL STRIP.AUTO: NEGATIVE
LYMPHOCYTES # BLD: 1.77 K/UL (ref 0.5–4.6)
LYMPHOCYTES NFR BLD: 19.1 % (ref 13–44)
MCH RBC QN AUTO: 29.2 PG (ref 26.1–32.9)
MCHC RBC AUTO-ENTMCNC: 32.7 G/DL (ref 31.4–35)
MCV RBC AUTO: 89.3 FL (ref 82–102)
MONOCYTES # BLD: 0.75 K/UL (ref 0.1–1.3)
MONOCYTES NFR BLD: 8.1 % (ref 4–12)
NEUTS SEG # BLD: 6.18 K/UL (ref 1.7–8.2)
NEUTS SEG NFR BLD: 66.4 % (ref 43–78)
NITRITE UR QL STRIP.AUTO: NEGATIVE
NRBC # BLD: 0 K/UL (ref 0–0.2)
PH UR STRIP: 5.5 (ref 5–9)
PLATELET # BLD AUTO: 416 K/UL (ref 150–450)
PMV BLD AUTO: 9.7 FL (ref 9.4–12.3)
POTASSIUM SERPL-SCNC: 4.3 MMOL/L (ref 3.5–5.1)
PROT SERPL-MCNC: 9 G/DL (ref 6.3–8.2)
PROT UR STRIP-MCNC: NEGATIVE MG/DL
RBC # BLD AUTO: 4.66 M/UL (ref 4.05–5.2)
SODIUM SERPL-SCNC: 134 MMOL/L (ref 136–145)
SP GR UR REFRACTOMETRY: 1 (ref 1–1.02)
UROBILINOGEN UR QL STRIP.AUTO: 0.2 EU/DL (ref 0.2–1)
WBC # BLD AUTO: 9.3 K/UL (ref 4.3–11.1)

## 2025-01-21 PROCEDURE — 85025 COMPLETE CBC W/AUTO DIFF WBC: CPT

## 2025-01-21 PROCEDURE — 99283 EMERGENCY DEPT VISIT LOW MDM: CPT

## 2025-01-21 PROCEDURE — 81025 URINE PREGNANCY TEST: CPT

## 2025-01-21 PROCEDURE — 80053 COMPREHEN METABOLIC PANEL: CPT

## 2025-01-21 PROCEDURE — 87086 URINE CULTURE/COLONY COUNT: CPT

## 2025-01-21 PROCEDURE — 81003 URINALYSIS AUTO W/O SCOPE: CPT

## 2025-01-21 ASSESSMENT — ENCOUNTER SYMPTOMS
BACK PAIN: 0
ANAL BLEEDING: 0
DIARRHEA: 0
COUGH: 0
SHORTNESS OF BREATH: 0
CONSTIPATION: 0
ABDOMINAL PAIN: 0
NAUSEA: 0
ABDOMINAL DISTENTION: 0
VOMITING: 0

## 2025-01-21 ASSESSMENT — PAIN - FUNCTIONAL ASSESSMENT: PAIN_FUNCTIONAL_ASSESSMENT: NONE - DENIES PAIN

## 2025-01-21 NOTE — DISCHARGE INSTRUCTIONS
Schedule close follow-up with primary care physician, GYN, urology in regards to symptoms.  Please return to ED if symptoms worsen or progress in any way.

## 2025-01-21 NOTE — ED PROVIDER NOTES
the last 72 hours.     Voice dictation software was used during the making of this note.  This software is not perfect and grammatical and other typographical errors may be present.  This note has not been completely proofread for errors.     Moy Gray Jr., MD  01/21/25 2011

## 2025-01-21 NOTE — ED TRIAGE NOTES
Per patient pelvic pain x7 days. States burning at pelvis. Denies dysuria. States of urinary frequency. Denies n/v. States of loose stools. Denies fever/chills.   
Vital Signs Last 24 Hrs  T(C): 37.2 (17 Apr 2022 19:44), Max: 37.2 (17 Apr 2022 15:42)  T(F): 98.9 (17 Apr 2022 19:44), Max: 99 (17 Apr 2022 15:42)  HR: 92 (17 Apr 2022 19:44) (92 - 107)  BP: 119/76 (17 Apr 2022 19:44) (79/58 - 184/79)  BP(mean): --  RR: 20 (17 Apr 2022 19:44) (16 - 20)  SpO2: 98% (17 Apr 2022 19:44) (92% - 98%)    PHYSICAL EXAM:  GENERAL: NAD, well-developed  HEAD:  Atraumatic, Normocephalic  EYES: EOMI, PERRLA, conjunctiva and sclera clear  oral cavity: whitish plaque noted, no excessive erythema or pus noted  NECK: Supple, erythema over the rt mandible graft side (reportedly chronic--no fluctuation discharge or tenderness)  CHEST/LUNG: Clear to auscultation bilaterally; No wheeze  HEART: Regular rate and rhythm; No murmurs, rubs, or gallops  ABDOMEN: Soft, Nontender, Nondistended; Bowel sounds present  EXTREMITIES:  rt LE splint in situ, neurovascular intact  PSYCH: AAOx3  NEUROLOGY: non-focal  SKIN: see above

## 2025-01-21 NOTE — ED NOTES
I have reviewed discharge instructions with the patient.  The patient verbalized understanding.    Patient left ED via Discharge Method: ambulatory to Home with self.    Opportunity for questions and clarification provided.       Patient given 0 scripts.         To continue your aftercare when you leave the hospital, you may receive an automated call from our care team to check in on how you are doing.  This is a free service and part of our promise to provide the best care and service to meet your aftercare needs.” If you have questions, or wish to unsubscribe from this service please call [phone].  Thank you for Choosing our [facility] Emergency Department.        Rosemary Carrero LPN  01/21/25 0954

## 2025-01-23 LAB
BACTERIA SPEC CULT: NORMAL
SERVICE CMNT-IMP: NORMAL

## 2025-03-13 ENCOUNTER — APPOINTMENT (OUTPATIENT)
Dept: GENERAL RADIOLOGY | Age: 45
End: 2025-03-13

## 2025-03-13 ENCOUNTER — HOSPITAL ENCOUNTER (EMERGENCY)
Age: 45
Discharge: HOME | End: 2025-03-13
Attending: EMERGENCY MEDICINE

## 2025-03-13 VITALS
TEMPERATURE: 99.3 F | RESPIRATION RATE: 16 BRPM | HEIGHT: 65 IN | WEIGHT: 145 LBS | OXYGEN SATURATION: 99 % | HEART RATE: 108 BPM | SYSTOLIC BLOOD PRESSURE: 136 MMHG | BODY MASS INDEX: 24.16 KG/M2 | DIASTOLIC BLOOD PRESSURE: 87 MMHG

## 2025-03-13 DIAGNOSIS — J06.9 VIRAL URI WITH COUGH: Primary | ICD-10-CM

## 2025-03-13 LAB
FLUAV RNA SPEC QL NAA+PROBE: NOT DETECTED
FLUBV RNA SPEC QL NAA+PROBE: NOT DETECTED

## 2025-03-13 PROCEDURE — 99284 EMERGENCY DEPT VISIT MOD MDM: CPT

## 2025-03-13 PROCEDURE — 71046 X-RAY EXAM CHEST 2 VIEWS: CPT

## 2025-03-13 PROCEDURE — 6370000000 HC RX 637 (ALT 250 FOR IP): Performed by: EMERGENCY MEDICINE

## 2025-03-13 PROCEDURE — 87502 INFLUENZA DNA AMP PROBE: CPT

## 2025-03-13 RX ORDER — BENZONATATE 100 MG/1
200 CAPSULE ORAL
Status: COMPLETED | OUTPATIENT
Start: 2025-03-13 | End: 2025-03-13

## 2025-03-13 RX ORDER — BENZONATATE 100 MG/1
200 CAPSULE ORAL 3 TIMES DAILY PRN
Qty: 30 CAPSULE | Refills: 0 | Status: SHIPPED | OUTPATIENT
Start: 2025-03-13 | End: 2025-03-18

## 2025-03-13 RX ADMIN — BENZONATATE 200 MG: 100 CAPSULE ORAL at 10:14

## 2025-03-13 ASSESSMENT — ENCOUNTER SYMPTOMS
DIARRHEA: 0
RHINORRHEA: 1
VOMITING: 0
SHORTNESS OF BREATH: 1
COUGH: 1
CHEST TIGHTNESS: 1
NAUSEA: 0

## 2025-03-13 ASSESSMENT — PAIN - FUNCTIONAL ASSESSMENT: PAIN_FUNCTIONAL_ASSESSMENT: 0-10

## 2025-03-13 ASSESSMENT — PAIN SCALES - GENERAL: PAINLEVEL_OUTOF10: 6

## 2025-03-13 ASSESSMENT — PAIN DESCRIPTION - PAIN TYPE: TYPE: ACUTE PAIN

## 2025-03-13 NOTE — ED PROVIDER NOTES
Emergency Department Provider Note       PCP: None, None   Age: 44 y.o.   Sex: female     DISPOSITION Decision To Discharge 03/13/2025 10:31:08 AM   DISPOSITION CONDITION Stable            ICD-10-CM    1. Viral URI with cough  J06.9           Medical Decision Making     With her symptoms I will get a chest x-ray and check for flu.  Since she just had COVID I do not think she needs any COVID testing.     1 acute, uncomplicated illness or injury.  Shared medical decision making was utilized in creating the patients health plan today.    I independently ordered and reviewed each unique test.       I interpreted the X-rays no obvious infiltrate or effusion.              History     44-year-old lady presents with concerns about cough and congestion.  She notes that she had COVID 3 to 4 weeks ago and thought that she got over it.  She notes that she has gotten worse again.  She said her cough is nonproductive.  Denies any fevers or chills but has had some bodyaches.    No nausea, vomiting, or diarrhea.    No other associated symptoms.    Elements of this note were created using speech recognition software.  As such, errors of speech recognition may be present.        ROS     Review of Systems   Constitutional:  Negative for chills and fever.   HENT:  Positive for congestion and rhinorrhea.    Respiratory:  Positive for cough, chest tightness and shortness of breath.    Gastrointestinal:  Negative for diarrhea, nausea and vomiting.   Genitourinary:  Negative for dysuria and hematuria.   Musculoskeletal:  Positive for myalgias.   Neurological:  Negative for dizziness and headaches.        Physical Exam     Vitals signs and nursing note reviewed:  Vitals:    03/13/25 0923   BP: (!) 140/91   Pulse: (!) 122   Resp: 18   Temp: 100 °F (37.8 °C)   TempSrc: Oral   SpO2: 97%   Weight: 65.8 kg (145 lb)   Height: 1.651 m (5' 5\")      Physical Exam  Vitals and nursing note reviewed.   Constitutional:       Appearance: Normal

## 2025-03-13 NOTE — ED TRIAGE NOTES
Pt 43 y/o female arrives to ED with a complaint of cough, fever, chills since last night. Pt denies nausea, vomiting or diarrhea.

## 2025-03-13 NOTE — ED NOTES
Patient mobility status  with no difficulty.     I have reviewed discharge instructions with the patient.  The patient verbalized understanding.    Patient left ED via Discharge Method: ambulatory to Home with  self .    Opportunity for questions and clarification provided.     Patient given 1 e scripts.            Hazel Abdul RN  03/13/25 [id number]

## 2025-03-13 NOTE — DISCHARGE INSTRUCTIONS
Please return with any vomiting, difficulty breathing, diarrhea, worsening symptoms, or additional concerns.    Please follow-up with your primary care provider for reevaluation as needed.

## 2025-06-01 ENCOUNTER — HOSPITAL ENCOUNTER (EMERGENCY)
Age: 45
Discharge: HOME | End: 2025-06-01
Attending: EMERGENCY MEDICINE
Payer: COMMERCIAL

## 2025-06-01 ENCOUNTER — APPOINTMENT (OUTPATIENT)
Dept: CT IMAGING | Age: 45
End: 2025-06-01
Payer: COMMERCIAL

## 2025-06-01 VITALS
DIASTOLIC BLOOD PRESSURE: 87 MMHG | WEIGHT: 160 LBS | HEART RATE: 72 BPM | OXYGEN SATURATION: 99 % | TEMPERATURE: 98.1 F | HEIGHT: 65 IN | SYSTOLIC BLOOD PRESSURE: 140 MMHG | RESPIRATION RATE: 16 BRPM | BODY MASS INDEX: 26.66 KG/M2

## 2025-06-01 DIAGNOSIS — R10.2 PELVIC PAIN: ICD-10-CM

## 2025-06-01 DIAGNOSIS — R35.0 URINARY FREQUENCY: Primary | ICD-10-CM

## 2025-06-01 LAB
ANION GAP SERPL CALC-SCNC: 11 MMOL/L (ref 7–16)
APPEARANCE UR: CLEAR
BACTERIA URNS QL MICRO: NEGATIVE /HPF
BASOPHILS # BLD: 0.07 K/UL (ref 0–0.2)
BASOPHILS NFR BLD: 0.6 % (ref 0–2)
BILIRUB UR QL: NEGATIVE
BUN SERPL-MCNC: 9 MG/DL (ref 6–23)
CALCIUM SERPL-MCNC: 9.3 MG/DL (ref 8.8–10.2)
CASTS URNS QL MICRO: 0 /LPF
CHLORIDE SERPL-SCNC: 105 MMOL/L (ref 98–107)
CO2 SERPL-SCNC: 22 MMOL/L (ref 20–29)
COLOR UR: NORMAL
CREAT SERPL-MCNC: 0.72 MG/DL (ref 0.6–1.1)
CRYSTALS URNS QL MICRO: 0 /LPF
DIFFERENTIAL METHOD BLD: ABNORMAL
EOSINOPHIL # BLD: 0.22 K/UL (ref 0–0.8)
EOSINOPHIL NFR BLD: 2 % (ref 0.5–7.8)
EPI CELLS #/AREA URNS HPF: NORMAL /HPF
ERYTHROCYTE [DISTWIDTH] IN BLOOD BY AUTOMATED COUNT: 12.8 % (ref 11.9–14.6)
GLUCOSE SERPL-MCNC: 104 MG/DL (ref 70–99)
GLUCOSE UR STRIP.AUTO-MCNC: NEGATIVE MG/DL
HCT VFR BLD AUTO: 44.2 % (ref 35.8–46.3)
HGB BLD-MCNC: 14.4 G/DL (ref 11.7–15.4)
HGB UR QL STRIP: NEGATIVE
HYALINE CASTS URNS QL MICRO: NORMAL /LPF
IMM GRANULOCYTES # BLD AUTO: 0.04 K/UL (ref 0–0.5)
IMM GRANULOCYTES NFR BLD AUTO: 0.4 % (ref 0–5)
KETONES UR QL STRIP.AUTO: NEGATIVE MG/DL
LEUKOCYTE ESTERASE UR QL STRIP.AUTO: NEGATIVE
LYMPHOCYTES # BLD: 1.12 K/UL (ref 0.5–4.6)
LYMPHOCYTES NFR BLD: 10.3 % (ref 13–44)
MCH RBC QN AUTO: 29.5 PG (ref 26.1–32.9)
MCHC RBC AUTO-ENTMCNC: 32.6 G/DL (ref 31.4–35)
MCV RBC AUTO: 90.6 FL (ref 82–102)
MONOCYTES # BLD: 0.75 K/UL (ref 0.1–1.3)
MONOCYTES NFR BLD: 6.9 % (ref 4–12)
MUCOUS THREADS URNS QL MICRO: 0 /LPF
NEUTS SEG # BLD: 8.64 K/UL (ref 1.7–8.2)
NEUTS SEG NFR BLD: 79.8 % (ref 43–78)
NITRITE UR QL STRIP.AUTO: NEGATIVE
NRBC # BLD: 0 K/UL (ref 0–0.2)
PH UR STRIP: 5.5 (ref 5–9)
PLATELET # BLD AUTO: 377 K/UL (ref 150–450)
PMV BLD AUTO: 10 FL (ref 9.4–12.3)
POTASSIUM SERPL-SCNC: 4.1 MMOL/L (ref 3.5–5.1)
PROT UR STRIP-MCNC: NEGATIVE MG/DL
RBC # BLD AUTO: 4.88 M/UL (ref 4.05–5.2)
RBC #/AREA URNS HPF: NORMAL /HPF
SODIUM SERPL-SCNC: 138 MMOL/L (ref 136–145)
SP GR UR REFRACTOMETRY: 1.01 (ref 1–1.02)
URINE CULTURE IF INDICATED: NORMAL
UROBILINOGEN UR QL STRIP.AUTO: 0.2 EU/DL (ref 0.2–1)
WBC # BLD AUTO: 10.8 K/UL (ref 4.3–11.1)
WBC URNS QL MICRO: NORMAL /HPF

## 2025-06-01 PROCEDURE — 87086 URINE CULTURE/COLONY COUNT: CPT

## 2025-06-01 PROCEDURE — 81001 URINALYSIS AUTO W/SCOPE: CPT

## 2025-06-01 PROCEDURE — 96374 THER/PROPH/DIAG INJ IV PUSH: CPT

## 2025-06-01 PROCEDURE — 6360000004 HC RX CONTRAST MEDICATION: Performed by: EMERGENCY MEDICINE

## 2025-06-01 PROCEDURE — 99285 EMERGENCY DEPT VISIT HI MDM: CPT

## 2025-06-01 PROCEDURE — 74177 CT ABD & PELVIS W/CONTRAST: CPT

## 2025-06-01 PROCEDURE — 6360000002 HC RX W HCPCS: Performed by: EMERGENCY MEDICINE

## 2025-06-01 PROCEDURE — 85025 COMPLETE CBC W/AUTO DIFF WBC: CPT

## 2025-06-01 PROCEDURE — 80048 BASIC METABOLIC PNL TOTAL CA: CPT

## 2025-06-01 RX ORDER — IOPAMIDOL 755 MG/ML
100 INJECTION, SOLUTION INTRAVASCULAR
Status: COMPLETED | OUTPATIENT
Start: 2025-06-01 | End: 2025-06-01

## 2025-06-01 RX ORDER — KETOROLAC TROMETHAMINE 15 MG/ML
15 INJECTION, SOLUTION INTRAMUSCULAR; INTRAVENOUS ONCE
Status: COMPLETED | OUTPATIENT
Start: 2025-06-01 | End: 2025-06-01

## 2025-06-01 RX ADMIN — IOPAMIDOL 100 ML: 755 INJECTION, SOLUTION INTRAVENOUS at 11:26

## 2025-06-01 RX ADMIN — KETOROLAC TROMETHAMINE 15 MG: 15 INJECTION, SOLUTION INTRAMUSCULAR; INTRAVENOUS at 10:41

## 2025-06-01 ASSESSMENT — PAIN - FUNCTIONAL ASSESSMENT: PAIN_FUNCTIONAL_ASSESSMENT: 0-10

## 2025-06-01 ASSESSMENT — PAIN SCALES - GENERAL: PAINLEVEL_OUTOF10: 8

## 2025-06-01 NOTE — ED PROVIDER NOTES
Glucose, Ur Negative NEG mg/dL    Ketones, Urine Negative NEG mg/dL    Bilirubin, Urine Negative NEG      Blood, Urine Negative NEG      Urobilinogen, Urine 0.2 0.2 - 1.0 EU/dL    Nitrite, Urine Negative NEG      Leukocyte Esterase, Urine Negative NEG      Urine Culture if Indicated CULTURE NOT INDICATED BY UA RESULT      WBC, UA 0-4 U4 /hpf    RBC, UA 0-5 U5 /hpf    BACTERIA, URINE Negative NEG /hpf    Epithelial Cells, UA 0-5 U5 /hpf    Hyaline Casts, UA 0-2 /lpf    Casts 0 0 /lpf    Crystals 0 0 /LPF    Mucus, UA 0 0 /lpf          CT ABDOMEN PELVIS W IV CONTRAST Additional Contrast? None   Final Result      1.  No acute abnormality of the abdomen and pelvis.      2.  Mild hepatomegaly.      3.  Cholelithiasis.               Electronically signed by Kinza Mariscal                       ED Course as of 06/01/25 1324   Sun Jun 01, 2025   1306 Patient is resting comfortably in bed.  I explained the results and plan.  Patient is comfortable with discharge. [CW]      ED Course User Index  [CW] Bryan Solis MD       I have considered all emergent medical conditions that could have caused patient's presentation to the emergency department today.  Based on their history, physical, and thorough evaluation, I have excluded all emergent medical conditions that require any further evaluation today in the ED or hospital.  I feel that the patient is safe for discharge.  The diagnosis and plan as well as the results of any testing (if done) and treatments (if done) today in the emergency department were communicated to the patient and/or their family/caregiver (if present).  The patient/caregiver verbalized understanding and compliance with the treatment plan.  Strict emergency department return precautions were given.  All questions and concerns were answered.      ICD-10-CM    1. Urinary frequency  R35.0 Ambulatory referral to Urology      2. Pelvic pain  R10.2 Ambulatory referral to Urology          DISPOSITION

## 2025-06-01 NOTE — ED TRIAGE NOTES
Patient arrived with a complaint of non resolving urinary problems. Patient have been treated and taken antibiotics but no solution.

## 2025-06-03 LAB
BACTERIA SPEC CULT: NORMAL
SERVICE CMNT-IMP: NORMAL

## 2025-06-10 ENCOUNTER — TELEPHONE (OUTPATIENT)
Dept: UROLOGY | Age: 45
End: 2025-06-10

## 2025-06-12 ENCOUNTER — OFFICE VISIT (OUTPATIENT)
Dept: UROLOGY | Age: 45
End: 2025-06-12
Payer: COMMERCIAL

## 2025-06-12 DIAGNOSIS — R39.89 BLADDER PAIN: ICD-10-CM

## 2025-06-12 DIAGNOSIS — R10.2 PELVIC PAIN: ICD-10-CM

## 2025-06-12 DIAGNOSIS — R35.0 URINARY FREQUENCY: Primary | ICD-10-CM

## 2025-06-12 LAB
BILIRUBIN, URINE, POC: NEGATIVE
BLOOD URINE, POC: NORMAL
GLUCOSE URINE, POC: NEGATIVE MG/DL
KETONES, URINE, POC: NEGATIVE MG/DL
LEUKOCYTE ESTERASE, URINE, POC: NORMAL
NITRITE, URINE, POC: NEGATIVE
PH, URINE, POC: 5.5 (ref 4.6–8)
PROTEIN,URINE, POC: NEGATIVE MG/DL
SPECIFIC GRAVITY, URINE, POC: 1 (ref 1–1.03)
URINALYSIS CLARITY, POC: NORMAL
URINALYSIS COLOR, POC: NORMAL
UROBILINOGEN, POC: NORMAL MG/DL

## 2025-06-12 PROCEDURE — 99205 OFFICE O/P NEW HI 60 MIN: CPT | Performed by: NURSE PRACTITIONER

## 2025-06-12 PROCEDURE — 81003 URINALYSIS AUTO W/O SCOPE: CPT | Performed by: NURSE PRACTITIONER

## 2025-06-12 ASSESSMENT — ENCOUNTER SYMPTOMS
BLOOD IN STOOL: 0
EYE DISCHARGE: 0
CONSTIPATION: 0
NAUSEA: 0
HEARTBURN: 0
INDIGESTION: 0
DIARRHEA: 0
WHEEZING: 0
SHORTNESS OF BREATH: 0
BACK PAIN: 0
EYE PAIN: 0
SKIN LESIONS: 0
ABDOMINAL PAIN: 0
COUGH: 0
VOMITING: 0

## 2025-06-12 NOTE — PROGRESS NOTES
[facility]  [address] [place] [id number]  [phone]          Lissette Cosme  : 1980    Chief Complaint   Patient presents with    New Patient    Urinary Frequency          HPI     Lissette Cosme is a 44 y.o. female who presents for urinary frequency, bladder pain, and pelvic pain.     She was seen by [facility] in  and . 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 NO prior cystoscopy.     CT a/p w IV contrast (25) normal urinary tract. Images reviewed personally.     Urine culture 25 and 25 wo bacterial growth. She reports having group B UTI a few weeks ago. I do not have this record for review. Seen at urgent care.    She cont to have sign urinary frequency, bladder pain, and pelvic pain. Reports these sx have occurred every year x 4 years. Typically occur Dec-.     Prev . Seeing GYN next week. ?mari-menopause. Irregular cycle. Not sexually active.     She has been doing fasting. This seems to improve her overall health. Has autoimmune issues (arthritis, psoriasis).        Past Medical History:   Diagnosis Date    Arthritis     Psoriasis      Past Surgical History:   Procedure Laterality Date    GYN          ORTHOPEDIC SURGERY      cyst behind right knee     No current outpatient medications on file.     No current facility-administered medications for this visit.     Allergies   Allergen Reactions    Prednisone Itching    Vancomycin Itching and Rash     Social History     Socioeconomic History    Marital status: Single     Spouse name: Not on file    Number of children: Not on file    Years of education: Not on file    Highest education level: Not on file   Occupational History    Not on file   Tobacco Use    Smoking status: Every Day     Current packs/day: 1.00     Types: Cigarettes    Smokeless tobacco: Never   Substance and Sexual Activity    Alcohol use: No    Drug use: No    Sexual activity: Not on

## 2025-06-13 DIAGNOSIS — R35.0 URINARY FREQUENCY: ICD-10-CM

## 2025-06-13 DIAGNOSIS — R10.2 PELVIC PAIN: ICD-10-CM

## 2025-06-13 DIAGNOSIS — R39.89 BLADDER PAIN: ICD-10-CM

## 2025-06-16 ENCOUNTER — TELEPHONE (OUTPATIENT)
Dept: UROLOGY | Age: 45
End: 2025-06-16

## 2025-06-16 ENCOUNTER — RESULTS FOLLOW-UP (OUTPATIENT)
Dept: UROLOGY | Age: 45
End: 2025-06-16

## 2025-06-16 LAB
BACTERIA SPEC CULT: ABNORMAL
SERVICE CMNT-IMP: ABNORMAL

## 2025-06-16 RX ORDER — SULFAMETHOXAZOLE AND TRIMETHOPRIM 800; 160 MG/1; MG/1
1 TABLET ORAL 2 TIMES DAILY
Qty: 10 TABLET | Refills: 0 | Status: SHIPPED | OUTPATIENT
Start: 2025-06-16 | End: 2025-06-21

## 2025-06-16 NOTE — TELEPHONE ENCOUNTER
Pt is calling in regarding the results of her test. 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 She says she has called 1x today and would like a call back today. She knows she will need an antibiotic. Pt states she still is feeling the same.Pt is still in a lot of pain and would like some medication. See note from 6/16/25. Pt states if anything is called in please send to [facility] in [place] is aware pt has contacted the office

## 2025-06-16 NOTE — TELEPHONE ENCOUNTER
Pt called in stating that she feels that the 5 days of medication will do what it needs to do for the MRSA. Let pt know per Np Wence, that it is slow growing and it will take care of it. Pt next option would be to do a cysto. Pt states she does not want to do that right now. Pt said she will go  the medication sent over today and go from there. See both notes from earlier on 6/16/25.

## 2025-06-16 NOTE — TELEPHONE ENCOUNTER
Patient called back with concern of UTI and antibiotic course. Due to low growth, 5 days is appropriate treatment. Treatment based on susceptibility. Recommend in office cystoscopy as her next treatment step/      Latesha Eli, MARITZA - CNP

## 2025-06-18 ENCOUNTER — TELEPHONE (OUTPATIENT)
Dept: UROLOGY | Age: 45
End: 2025-06-18

## 2025-06-18 NOTE — TELEPHONE ENCOUNTER
Pt calling in stating Np Delonnccarlos had talked to her about a procedure that she could have done. She is unsure of what the procedure it consists of. Pt would like in depth detail about this procedure. Pt would like to know how it is done and what it checks for. Please advise.

## 2025-06-18 NOTE — TELEPHONE ENCOUNTER
Called pt back and let her know the cost of the cysto in our office. Let pt know that it will be $252 out of pocket. Let pt know she would have to pay the $252 up front. We don't offer any payment plans or can the pt be billed. Pt asked what about the one done at the hospital. I let pt know bc it is done at the hospital, their billing is different from ours. Pt should be able to contact to them at get a price. Pt states she is just trying to get all her options and to hold off on scheduling her for anything right now.

## 2025-06-18 NOTE — TELEPHONE ENCOUNTER
Pt called in to see when she could get cysto done. Looked up cysto with Dr. Thomas and he did not have anything until 7/28. Pt wanted something next week. Pt waited to schedule the cysto. Pt also wanted to know the price of the cysto with out insurance. Let pt know I would call her back with the amount.

## 2025-06-18 NOTE — TELEPHONE ENCOUNTER
Pt called back in stating she needs someone to call her about the cysto. She can't get into her my chart to see the message that was sent to her from [facility] Eli[facility]or[facility]. Pt wants to another is there another way for her to receive the links. Or can someone please call. She can't get into her mychart. See [employer] message and telephone encounter from 6/18/25.